# Patient Record
Sex: MALE | Race: WHITE
[De-identification: names, ages, dates, MRNs, and addresses within clinical notes are randomized per-mention and may not be internally consistent; named-entity substitution may affect disease eponyms.]

---

## 2020-10-07 ENCOUNTER — HOSPITAL ENCOUNTER (EMERGENCY)
Dept: HOSPITAL 62 - ER | Age: 50
Discharge: ANOTHER HOSPITAL | End: 2020-10-07
Payer: SELF-PAY

## 2020-10-07 VITALS — SYSTOLIC BLOOD PRESSURE: 119 MMHG | DIASTOLIC BLOOD PRESSURE: 77 MMHG

## 2020-10-07 DIAGNOSIS — K81.9: ICD-10-CM

## 2020-10-07 DIAGNOSIS — R10.10: ICD-10-CM

## 2020-10-07 DIAGNOSIS — M54.5: ICD-10-CM

## 2020-10-07 DIAGNOSIS — K86.9: Primary | ICD-10-CM

## 2020-10-07 DIAGNOSIS — R17: ICD-10-CM

## 2020-10-07 LAB
ABSOLUTE LYMPHOCYTES# (MANUAL): 2.7 10^3/UL (ref 0.5–4.7)
ABSOLUTE MONOCYTES # (MANUAL): 1.4 10^3/UL (ref 0.1–1.4)
ADD MANUAL DIFF: YES
ALBUMIN SERPL-MCNC: 3.7 G/DL (ref 3.5–5)
ALP SERPL-CCNC: 875 U/L (ref 38–126)
ANION GAP SERPL CALC-SCNC: 13 MMOL/L (ref 5–19)
ANISOCYTOSIS BLD QL SMEAR: SLIGHT
APPEARANCE UR: CLEAR
APTT PPP: (no result) S
AST SERPL-CCNC: 90 U/L (ref 17–59)
BASOPHILS NFR BLD MANUAL: 1 % (ref 0–2)
BILIRUB DIRECT SERPL-MCNC: 10.4 MG/DL (ref 0–0.4)
BILIRUB SERPL-MCNC: 11.7 MG/DL (ref 0.2–1.3)
BILIRUB UR QL STRIP: NEGATIVE
BUN SERPL-MCNC: 7 MG/DL (ref 7–20)
CALCIUM: 8.9 MG/DL (ref 8.4–10.2)
CHLORIDE SERPL-SCNC: 101 MMOL/L (ref 98–107)
CO2 SERPL-SCNC: 27 MMOL/L (ref 22–30)
EOSINOPHIL NFR BLD MANUAL: 2 % (ref 0–6)
ERYTHROCYTE [DISTWIDTH] IN BLOOD BY AUTOMATED COUNT: 15.9 % (ref 11.5–14)
GLUCOSE SERPL-MCNC: 110 MG/DL (ref 75–110)
GLUCOSE UR STRIP-MCNC: NEGATIVE MG/DL
HCT VFR BLD CALC: 35.5 % (ref 37.9–51)
HGB BLD-MCNC: 12.1 G/DL (ref 13.5–17)
KETONES UR STRIP-MCNC: NEGATIVE MG/DL
MCH RBC QN AUTO: 28.2 PG (ref 27–33.4)
MCHC RBC AUTO-ENTMCNC: 34.1 G/DL (ref 32–36)
MCV RBC AUTO: 83 FL (ref 80–97)
METAMYELOCYTES % (MANUAL): 1 % (ref 0–1)
MONOCYTES % (MANUAL): 9 % (ref 3–13)
NITRITE UR QL STRIP: NEGATIVE
OVALOCYTES BLD QL SMEAR: (no result)
PH UR STRIP: 7 [PH] (ref 5–9)
PLATELET # BLD: 373 10^3/UL (ref 150–450)
PLATELET CLUMP BLD QL SMEAR: PRESENT
PLATELET COMMENT: ADEQUATE
POTASSIUM SERPL-SCNC: 3.1 MMOL/L (ref 3.6–5)
PROT SERPL-MCNC: 7.2 G/DL (ref 6.3–8.2)
PROT UR STRIP-MCNC: NEGATIVE MG/DL
RBC # BLD AUTO: 4.29 10^6/UL (ref 4.35–5.55)
SEGMENTED NEUTROPHILS % (MAN): 70 % (ref 42–78)
SP GR UR STRIP: 1
STOMATOCYTES BLD QL SMEAR: (no result)
TOTAL CELLS COUNTED BLD: 100
UROBILINOGEN UR-MCNC: NEGATIVE MG/DL (ref ?–2)
VARIANT LYMPHS NFR BLD MANUAL: 11 % (ref 13–45)
WBC # BLD AUTO: 16 10^3/UL (ref 4–10.5)

## 2020-10-07 PROCEDURE — 87077 CULTURE AEROBIC IDENTIFY: CPT

## 2020-10-07 PROCEDURE — 85025 COMPLETE CBC W/AUTO DIFF WBC: CPT

## 2020-10-07 PROCEDURE — 87150 DNA/RNA AMPLIFIED PROBE: CPT

## 2020-10-07 PROCEDURE — 96375 TX/PRO/DX INJ NEW DRUG ADDON: CPT

## 2020-10-07 PROCEDURE — 81001 URINALYSIS AUTO W/SCOPE: CPT

## 2020-10-07 PROCEDURE — 83690 ASSAY OF LIPASE: CPT

## 2020-10-07 PROCEDURE — 76705 ECHO EXAM OF ABDOMEN: CPT

## 2020-10-07 PROCEDURE — 96366 THER/PROPH/DIAG IV INF ADDON: CPT

## 2020-10-07 PROCEDURE — 87186 SC STD MICRODIL/AGAR DIL: CPT

## 2020-10-07 PROCEDURE — 96361 HYDRATE IV INFUSION ADD-ON: CPT

## 2020-10-07 PROCEDURE — 87040 BLOOD CULTURE FOR BACTERIA: CPT

## 2020-10-07 PROCEDURE — 96365 THER/PROPH/DIAG IV INF INIT: CPT

## 2020-10-07 PROCEDURE — 36415 COLL VENOUS BLD VENIPUNCTURE: CPT

## 2020-10-07 PROCEDURE — 80053 COMPREHEN METABOLIC PANEL: CPT

## 2020-10-07 PROCEDURE — 74177 CT ABD & PELVIS W/CONTRAST: CPT

## 2020-10-07 PROCEDURE — 99285 EMERGENCY DEPT VISIT HI MDM: CPT

## 2020-10-07 NOTE — RADIOLOGY REPORT (SQ)
EXAM DESCRIPTION:  CT ABD/PELVIS WITH IV   ORAL



IMAGES COMPLETED DATE/TIME:  10/7/2020 7:47 pm



REASON FOR STUDY:  right upper quadrant pain



COMPARISON:  None.



TECHNIQUE:  CT scan of the abdomen and pelvis performed using helical scanning technique with dynamic
 intravenous contrast injection.  Oral contrast. Images reviewed with lung, soft tissue, and bone win
dows. Reconstructed coronal and sagittal MPR images reviewed. Delayed images for evaluation of the ur
inary system also acquired. All images stored on PACS.

All CT scanners at this facility use dose modulation, iterative reconstruction, and/or weight based d
osing when appropriate to reduce radiation dose to as low as reasonably achievable (ALARA).

[facility]: Dose Right  [facility]: CareDose    [facility]: Dose Right    [facility]: Teradose 4D    [facility]: Smart Technologies



CONTRAST TYPE AND DOSE:  contrast/concentration: Isovue 350.00 mmol/ml; Total Contrast Delivered: 67.
9 ml; Total Saline Delivered: 59.0 ml



RENAL FUNCTION:  BUN 7 creatinine 0.64



RADIATION DOSE:  CT Rad equipment meets quality standard of care and radiation dose reduction techniq
ues were employed. CTDIvol: 5.4 - 6.6 mGy. DLP: 661 mGy-cm..



LIMITATIONS:  None.



FINDINGS:  LOWER CHEST: No significant findings. No nodules or infiltrates.

LIVER: Several low-density masses are seen.  The largest is in the right lobe and measures 5 cm.  Int
rahepatic and extrahepatic ductal dilatation is present.

SPLEEN: Normal size. No focal lesions.

PANCREAS: There is a 52 x 64 mm mass in the head of the pancreas.

GALLBLADDER: No identified stones by CT criteria. No inflammatory changes to suggest cholecystitis.

ADRENAL GLANDS: No significant masses or asymmetry.

RIGHT KIDNEY AND URETER: No solid masses.   No significant calcifications.   No hydronephrosis or hyd
roureter.

LEFT KIDNEY AND URETER: No solid masses.   No significant calcifications.   No hydronephrosis or hydr
oureter.

AORTA AND VESSELS: No aneurysm. No dissection. Renal arteries, SMA, celiac without stenosis.

RETROPERITONEUM: No retroperitoneal adenopathy, hemorrhage or masses.

BOWEL AND PERITONEAL CAVITY: No masses or inflammatory changes. No free fluid or peritoneal masses.

APPENDIX: Not identified.

PELVIS: No mass.  No free fluid. Normal bladder.

ABDOMINAL WALL: No masses. No hernias.

BONES: No significant or acute findings.

OTHER: No other significant finding.



IMPRESSION:  Large mass in the head of the pancreas with hepatic metastases and biliary obstruction.



TECHNICAL DOCUMENTATION:  JOB ID:  [id number]

Quality ID # 436: Final reports with documentation of one or more dose reduction techniques (e.g., Au
tomated exposure control, adjustment of the mA and/or kV according to patient size, use of iterative 
reconstruction technique)

 2011 [employer]- All Rights Reserved



Reading location - IP/workstation name: [email or web address]

## 2020-10-07 NOTE — ER DOCUMENT REPORT
ED General





- General


Chief Complaint: Abdominal Pain


Stated Complaint: LOW BACK PAIN,DARK URINE


Time Seen by Provider: 10/07/20 11:46


Mode of Arrival: Ambulatory


Information source: Patient


TRAVEL OUTSIDE OF THE [place] IN LAST 30 DAYS: No





- HPI


Notes: 





Patient arrives complaining of upper abdominal pain as well as back pain.  

Patient states that this pain seemed to start yesterday.  It is been relatively 

constant.  Nothing makes better or worse.  No significant radiation of pain.  It

is been a dull sensation.  He has had no diarrhea.  No vomiting but some nausea 

and decreased appetite.  He denies any chronic medical conditions.  He denies 

any previous surgeries.  He denies any chronic daily medications.  He denies any

alcohol use.  The pain is been moderate in intensity.





- Related Data


Allergies/Adverse Reactions: 


                                        





No Known Allergies Allergy (Unverified 07/05/16 02:31)


   








Home Medications: prolosec, aspirin, IBU





Past Medical History





- General


Information source: Patient





- Social History


Smoking Status: Never Smoker


Frequency of alcohol use: None


Drug Abuse: None


Family History: Reviewed & Not Pertinent.  denies: CAD





Review of Systems





- Review of Systems


Constitutional: denies: Chills, Fever


Cardiovascular: denies: Chest pain, Palpitations


Respiratory: denies: Cough, Short of breath


-: Yes All other systems reviewed and negative





Physical Exam





- Vital signs


Vitals: 


                                        











Temp Pulse Resp BP Pulse Ox


 


 97.5 F   82   18   132/76 H  99 


 


 10/07/20 08:20  10/07/20 08:20  10/07/20 08:20  10/07/20 08:20  10/07/20 08:20











Interpretation: Normal





- General


General appearance: Appears well, Alert





- HEENT


Head: Normocephalic, Atraumatic


Eyes: Normal


Pupils: PERRL





- Respiratory


Respiratory status: No respiratory distress


Chest status: Nontender


Breath sounds: Normal


Chest palpation: Normal





- Cardiovascular


Rhythm: Regular


Heart sounds: Normal auscultation


Murmur: No





- Abdominal


Inspection: Normal


Distension: No distension


Bowel sounds: Normal


Tenderness: Tender - Mild right upper quadrant tenderness to palpation


Organomegaly: No organomegaly





- Back


Back: Normal, Nontender





- Extremities


General upper extremity: Normal inspection, Nontender, Normal color, Normal ROM,

Normal temperature


General lower extremity: Normal inspection, Nontender, Normal color, Normal ROM,

Normal temperature, Normal weight bearing.  No: Janette's sign





- Neurological


Neuro grossly intact: Yes


Cognition: Normal


Orientation: AAOx4


Rachael Coma Scale Eye Opening: Spontaneous


Paxinos Coma Scale Verbal: Oriented


Rachael Coma Scale Motor: Obeys Commands


Paxinos Coma Scale Total: 15


Speech: Normal


Motor strength normal: LUE, RUE, LLE, RLE


Sensory: Normal





- Psychological


Associated symptoms: Normal affect, Normal mood





- Skin


Skin Temperature: Warm


Skin Moisture: Dry


Skin Color: Jaundiced





Course





- Re-evaluation


Re-evalutation: 





10/07/20 18:16


I did reexamine patient at 615.  He is resting comfortably in the bed and 

drinking his oral contrast.  Care of this patient will be turned over to Dr. Bell will follow-up on CT scan and discussed results with surgeon determine 

final disposition.





- Vital Signs


Vital signs: 


                                        











Temp Pulse Resp BP Pulse Ox


 


 97.4 F   89   14   129/77 H  100 


 


 10/07/20 14:47  10/07/20 14:47  10/07/20 14:47  10/07/20 14:47  10/07/20 14:47














- Laboratory


Result Diagrams: 


                                 10/07/20 10:01





                                 10/07/20 10:01


Laboratory results interpreted by me: 


                                        











  10/07/20 10/07/20





  10:01 10:01


 


WBC  16.0 H 


 


RBC  4.29 L 


 


Hgb  12.1 L 


 


Hct  35.5 L 


 


RDW  15.9 H 


 


Lymphocytes % (Manual)  11 L 


 


Abs Neuts (Manual)  11.4 H 


 


Potassium   3.1 L


 


Total Bilirubin   11.7 H


 


Direct Bilirubin   10.4 H


 


AST   90 H


 


ALT   147 H


 


Alkaline Phosphatase   875 H














- Diagnostic Test


Radiology reviewed: Image reviewed, Reports reviewed





Discharge





- Discharge


Clinical Impression: 


 Cholecystitis





Condition: Stable


Disposition: OTHER

## 2020-10-07 NOTE — RADIOLOGY REPORT (SQ)
EXAM DESCRIPTION:  U/S ABDOMEN LIMITED W/O DOP



IMAGES COMPLETED DATE/TIME:  10/7/2020 2:47 pm



REASON FOR STUDY:  ruq pain/jaundiced



COMPARISON:  None.



TECHNIQUE:  Dynamic and static grayscale images acquired of the abdomen and recorded on PACS. Blueo
roseann selected color Doppler and spectral images recorded.



LIMITATIONS:  None.



FINDINGS:  PANCREAS: No masses.  Pancreatic duct is dilated.  6 mm.

LIVER: Heterogeneous echotexture.  There are a couple of somewhat ill-defined hypoechoic heterogeneou
s masses in the right lobe.  The smaller measures 4.6 x 3.4 x 4 cm.  The larger measures 5.3 x 6.1 x 
4.5 cm.  There is increased blood flow with the smaller lesion.  There is a 13 mm echogenic area with
in the right lobe of the liver suggestive of a hemangioma.

LIVER VASCULATURE: Normal directional flow of the main portal vein and hepatic veins.

GALLBLADDER: Gallbladder is distended.  No stones are seen.  There is sludge.  The gallbladder wall i
s thickened.

ULTRASOUND-DETECTED HARRIS'S SIGN: Positive.

INTRAHEPATIC DUCTS AND COMMON DUCT: Common bile duct and intrahepatic ducts are dilated.  Common bile
 duct measures 18 mm.

AORTA: No aneurysm.

RIGHT KIDNEY:  Normal size, 12.2 cm. Normal echogenicity. No solid or suspicious masses. No hydroneph
rosis. No calcifications.

PERITONEAL AND RIGHT PLEURAL SPACE: No ascites or effusions.

OTHER: No other significant findings.



IMPRESSION:  1.  Gallbladder sludge.  Thickening of the gallbladder wall.  Positive sonographic Nohemi
y sign.  Correlate for cholecystitis.

2.  Dilated intrahepatic ducts, common bile duct, and pancreatic duct.  Correlate for biliary obstruc
tion.

3.  There are 2 hypoechoic heterogeneous masses in the right lobe of the liver.  Cannot exclude neopl
asm.  Recommend CT with contrast.  Recommend MRI with hepatic protocol.

4.  There is what appears to be a small hemangioma in the right lobe of the liver.



TECHNICAL DOCUMENTATION:  JOB ID:  [id number]

 2011 [employer]- All Rights Reserved



Reading location - IP/workstation name: [email or web address]

## 2020-11-05 ENCOUNTER — HOSPITAL ENCOUNTER (OUTPATIENT)
Dept: HOSPITAL 62 - ER | Age: 50
Setting detail: OBSERVATION
LOS: 2 days | Discharge: HOME | End: 2020-11-07
Attending: NURSE PRACTITIONER | Admitting: HOSPITALIST
Payer: COMMERCIAL

## 2020-11-05 DIAGNOSIS — R11.2: ICD-10-CM

## 2020-11-05 DIAGNOSIS — R00.0: ICD-10-CM

## 2020-11-05 DIAGNOSIS — I95.9: ICD-10-CM

## 2020-11-05 DIAGNOSIS — R53.1: ICD-10-CM

## 2020-11-05 DIAGNOSIS — R53.81: ICD-10-CM

## 2020-11-05 DIAGNOSIS — Z60.2: ICD-10-CM

## 2020-11-05 DIAGNOSIS — C78.7: ICD-10-CM

## 2020-11-05 DIAGNOSIS — F41.9: ICD-10-CM

## 2020-11-05 DIAGNOSIS — Z79.899: ICD-10-CM

## 2020-11-05 DIAGNOSIS — D63.0: ICD-10-CM

## 2020-11-05 DIAGNOSIS — Z03.818: ICD-10-CM

## 2020-11-05 DIAGNOSIS — R53.83: ICD-10-CM

## 2020-11-05 DIAGNOSIS — E86.0: ICD-10-CM

## 2020-11-05 DIAGNOSIS — C25.9: Primary | ICD-10-CM

## 2020-11-05 DIAGNOSIS — K59.00: ICD-10-CM

## 2020-11-05 DIAGNOSIS — R06.82: ICD-10-CM

## 2020-11-05 DIAGNOSIS — Z96.89: ICD-10-CM

## 2020-11-05 DIAGNOSIS — D72.829: ICD-10-CM

## 2020-11-05 DIAGNOSIS — E46: ICD-10-CM

## 2020-11-05 DIAGNOSIS — Z80.0: ICD-10-CM

## 2020-11-05 DIAGNOSIS — D47.3: ICD-10-CM

## 2020-11-05 LAB
ABSOLUTE LYMPHOCYTES# (MANUAL): 1.1 10^3/UL (ref 0.5–4.7)
ABSOLUTE LYMPHOCYTES# (MANUAL): 1.6 10^3/UL (ref 0.5–4.7)
ABSOLUTE MONOCYTES # (MANUAL): 1.4 10^3/UL (ref 0.1–1.4)
ABSOLUTE MONOCYTES # (MANUAL): 2 10^3/UL (ref 0.1–1.4)
ADD MANUAL DIFF: YES
ADD MANUAL DIFF: YES
ALBUMIN SERPL-MCNC: 2.6 G/DL (ref 3.5–5)
ALP SERPL-CCNC: 508 U/L (ref 38–126)
ANION GAP SERPL CALC-SCNC: 9 MMOL/L (ref 5–19)
ANISOCYTOSIS BLD QL SMEAR: SLIGHT
ANISOCYTOSIS BLD QL SMEAR: SLIGHT
APPEARANCE UR: CLEAR
APTT PPP: YELLOW S
AST SERPL-CCNC: 21 U/L (ref 17–59)
BASE EXCESS BLDV CALC-SCNC: 1.1 MMOL/L
BASOPHILS NFR BLD MANUAL: 0 % (ref 0–2)
BASOPHILS NFR BLD MANUAL: 1 % (ref 0–2)
BILIRUB DIRECT SERPL-MCNC: 0.8 MG/DL (ref 0–0.4)
BILIRUB SERPL-MCNC: 1.2 MG/DL (ref 0.2–1.3)
BILIRUB UR QL STRIP: NEGATIVE
BUN SERPL-MCNC: 12 MG/DL (ref 7–20)
CALCIUM: 8.6 MG/DL (ref 8.4–10.2)
CHLORIDE SERPL-SCNC: 102 MMOL/L (ref 98–107)
CO2 SERPL-SCNC: 26 MMOL/L (ref 22–30)
DACRYOCYTES BLD QL SMEAR: SLIGHT
EOSINOPHIL NFR BLD MANUAL: 3 % (ref 0–6)
EOSINOPHIL NFR BLD MANUAL: 4 % (ref 0–6)
ERYTHROCYTE [DISTWIDTH] IN BLOOD BY AUTOMATED COUNT: 15.2 % (ref 11.5–14)
ERYTHROCYTE [DISTWIDTH] IN BLOOD BY AUTOMATED COUNT: 15.4 % (ref 11.5–14)
GLUCOSE SERPL-MCNC: 127 MG/DL (ref 75–110)
GLUCOSE UR STRIP-MCNC: NEGATIVE MG/DL
HCO3 BLDV-SCNC: 25.3 MMOL/L (ref 20–32)
HCT VFR BLD CALC: 25.1 % (ref 37.9–51)
HCT VFR BLD CALC: 26 % (ref 37.9–51)
HGB BLD-MCNC: 8.3 G/DL (ref 13.5–17)
HGB BLD-MCNC: 9 G/DL (ref 13.5–17)
INR PPP: 1.34
KETONES UR STRIP-MCNC: NEGATIVE MG/DL
MCH RBC QN AUTO: 28.1 PG (ref 27–33.4)
MCH RBC QN AUTO: 29 PG (ref 27–33.4)
MCHC RBC AUTO-ENTMCNC: 33 G/DL (ref 32–36)
MCHC RBC AUTO-ENTMCNC: 34.7 G/DL (ref 32–36)
MCV RBC AUTO: 84 FL (ref 80–97)
MCV RBC AUTO: 85 FL (ref 80–97)
MONOCYTES % (MANUAL): 7 % (ref 3–13)
MONOCYTES % (MANUAL): 9 % (ref 3–13)
NEUTS BAND NFR BLD MANUAL: 1 % (ref 3–5)
NITRITE UR QL STRIP: NEGATIVE
OVALOCYTES BLD QL SMEAR: (no result)
PCO2 BLDV: 37.9 MMHG (ref 35–63)
PH BLDV: 7.44 [PH] (ref 7.3–7.42)
PH UR STRIP: 7 [PH] (ref 5–9)
PLATELET # BLD: 445 10^3/UL (ref 150–450)
PLATELET # BLD: 473 10^3/UL (ref 150–450)
PLATELET COMMENT: (no result)
PLATELET COMMENT: ADEQUATE
POIKILOCYTOSIS BLD QL SMEAR: (no result)
POIKILOCYTOSIS BLD QL SMEAR: SLIGHT
POLYCHROMASIA BLD QL SMEAR: SLIGHT
POLYCHROMASIA BLD QL SMEAR: SLIGHT
POTASSIUM SERPL-SCNC: 4.4 MMOL/L (ref 3.6–5)
PROT SERPL-MCNC: 6.2 G/DL (ref 6.3–8.2)
PROT UR STRIP-MCNC: NEGATIVE MG/DL
PROTHROMBIN TIME: 16.8 SEC (ref 11.4–15.4)
RBC # BLD AUTO: 2.96 10^6/UL (ref 4.35–5.55)
RBC # BLD AUTO: 3.1 10^6/UL (ref 4.35–5.55)
SEGMENTED NEUTROPHILS % (MAN): 81 % (ref 42–78)
SEGMENTED NEUTROPHILS % (MAN): 81 % (ref 42–78)
SP GR UR STRIP: 1.04
TARGETS BLD QL SMEAR: SLIGHT
TOTAL CELLS COUNTED BLD: 100
TOTAL CELLS COUNTED BLD: 100
UROBILINOGEN UR-MCNC: NEGATIVE MG/DL (ref ?–2)
VARIANT LYMPHS NFR BLD MANUAL: 4 % (ref 13–45)
VARIANT LYMPHS NFR BLD MANUAL: 8 % (ref 13–45)
WBC # BLD AUTO: 20.5 10^3/UL (ref 4–10.5)
WBC # BLD AUTO: 22.3 10^3/UL (ref 4–10.5)
WBC TOXIC VACUOLES BLD QL SMEAR: PRESENT

## 2020-11-05 PROCEDURE — 96360 HYDRATION IV INFUSION INIT: CPT

## 2020-11-05 PROCEDURE — 80048 BASIC METABOLIC PNL TOTAL CA: CPT

## 2020-11-05 PROCEDURE — C1788 PORT, INDWELLING, IMP: HCPCS

## 2020-11-05 PROCEDURE — 74177 CT ABD & PELVIS W/CONTRAST: CPT

## 2020-11-05 PROCEDURE — 86850 RBC ANTIBODY SCREEN: CPT

## 2020-11-05 PROCEDURE — G0378 HOSPITAL OBSERVATION PER HR: HCPCS

## 2020-11-05 PROCEDURE — 36430 TRANSFUSION BLD/BLD COMPNT: CPT

## 2020-11-05 PROCEDURE — 83605 ASSAY OF LACTIC ACID: CPT

## 2020-11-05 PROCEDURE — C9113 INJ PANTOPRAZOLE SODIUM, VIA: HCPCS

## 2020-11-05 PROCEDURE — 81001 URINALYSIS AUTO W/SCOPE: CPT

## 2020-11-05 PROCEDURE — 87635 SARS-COV-2 COVID-19 AMP PRB: CPT

## 2020-11-05 PROCEDURE — C9803 HOPD COVID-19 SPEC COLLECT: HCPCS

## 2020-11-05 PROCEDURE — 87040 BLOOD CULTURE FOR BACTERIA: CPT

## 2020-11-05 PROCEDURE — P9016 RBC LEUKOCYTES REDUCED: HCPCS

## 2020-11-05 PROCEDURE — 36415 COLL VENOUS BLD VENIPUNCTURE: CPT

## 2020-11-05 PROCEDURE — 85610 PROTHROMBIN TIME: CPT

## 2020-11-05 PROCEDURE — 82533 TOTAL CORTISOL: CPT

## 2020-11-05 PROCEDURE — 77001 FLUOROGUIDE FOR VEIN DEVICE: CPT

## 2020-11-05 PROCEDURE — 36561 INSERT TUNNELED CV CATH: CPT

## 2020-11-05 PROCEDURE — 86900 BLOOD TYPING SEROLOGIC ABO: CPT

## 2020-11-05 PROCEDURE — 71045 X-RAY EXAM CHEST 1 VIEW: CPT

## 2020-11-05 PROCEDURE — 82803 BLOOD GASES ANY COMBINATION: CPT

## 2020-11-05 PROCEDURE — 85027 COMPLETE CBC AUTOMATED: CPT

## 2020-11-05 PROCEDURE — 80053 COMPREHEN METABOLIC PANEL: CPT

## 2020-11-05 PROCEDURE — 00532 ANES ACCESS CTR VENOUS CRCJ: CPT

## 2020-11-05 PROCEDURE — 99285 EMERGENCY DEPT VISIT HI MDM: CPT

## 2020-11-05 PROCEDURE — 93005 ELECTROCARDIOGRAM TRACING: CPT

## 2020-11-05 PROCEDURE — 93010 ELECTROCARDIOGRAM REPORT: CPT

## 2020-11-05 PROCEDURE — 86920 COMPATIBILITY TEST SPIN: CPT

## 2020-11-05 PROCEDURE — 85025 COMPLETE CBC W/AUTO DIFF WBC: CPT

## 2020-11-05 PROCEDURE — 86901 BLOOD TYPING SEROLOGIC RH(D): CPT

## 2020-11-05 RX ADMIN — SODIUM CHLORIDE PRN MLS/HR: 9 INJECTION, SOLUTION INTRAVENOUS at 21:10

## 2020-11-05 RX ADMIN — PANTOPRAZOLE SODIUM SCH MG: 40 INJECTION, POWDER, FOR SOLUTION INTRAVENOUS at 21:10

## 2020-11-05 RX ADMIN — DEXAMETHASONE SODIUM PHOSPHATE PRN MG: 10 INJECTION INTRAMUSCULAR; INTRAVENOUS at 16:37

## 2020-11-05 RX ADMIN — PROMETHAZINE HYDROCHLORIDE PRN MG: 25 INJECTION INTRAMUSCULAR; INTRAVENOUS at 22:29

## 2020-11-05 RX ADMIN — OXYCODONE HYDROCHLORIDE PRN MG: 5 TABLET ORAL at 16:34

## 2020-11-05 SDOH — SOCIAL STABILITY - SOCIAL INSECURITY: PROBLEMS RELATED TO LIVING ALONE: Z60.2

## 2020-11-05 NOTE — RADIOLOGY REPORT (SQ)
EXAM DESCRIPTION:  CHEST SINGLE VIEW



IMAGES COMPLETED DATE/TIME:  11/5/2020 2:57 pm



REASON FOR STUDY:  tachypnea



COMPARISON:  None.



EXAM PARAMETERS:  NUMBER OF VIEWS: One view.

TECHNIQUE: Single frontal radiographic view of the chest acquired.

RADIATION DOSE: NA

LIMITATIONS: None.



FINDINGS:  LUNGS AND PLEURA:  Low lung volumes and patient rotation limits examination somewhat.  Que
stion of superimposition of structures versus vague infiltrate right infrahilar region.  The left sandy
g is clear.  No pneumothorax or pleural effusion.

MEDIASTINUM AND HILAR STRUCTURES: No masses.  Contour normal.

HEART AND VASCULAR STRUCTURES: Heart normal in size.  Normal vasculature.

BONES: No acute findings.

HARDWARE: None in the chest.

OTHER: No other significant finding.



IMPRESSION:  1.  Low lung volumes and patient rotation limiting the examination somewhat.  Question o
f superimposition of structures versus vague infiltrate right infrahilar region.



TECHNICAL DOCUMENTATION:  JOB ID:  [id number]

 2011 [employer]- All Rights Reserved



Reading location - IP/workstation name: [email or web address]

## 2020-11-05 NOTE — PDOC H&P
History of Present Illness


Admission Date/PCP: 


  11/05/20 13:44





  CHERRY JAVIER MD





Patient complains of: nausea, vomiting


History of Present Illness: 


LEON WARNER is a 50 year old male with a past medical history significant for 

recently diagnosed pancreatic cancer with liver metastasis who presented to the 

emergency department today with a complaint of 1 day of nausea and vomiting; and

able to tolerate p.o. fluids.  He denies worsened abdominal discomfort from his 

baseline, diarrhea and constipation.  He further denies fever, chills, chest 

pain, palpitations, dyspnea, orthopnea.


Evaluation in the emergency department revealed hypotension (89/63), tachypnea 

(RR 42), maintaining oxygen saturations on room air and otherwise normal vital 

signs.


CBC revealed leukocytosis (WBCs 22.3), anemia (hemoglobin 8.3; decreased from 

12.1 last month), and thrombocytosis ().  INR is slightly elevated 1.34. 

CBG is acceptable.  Chemistry is notable for mildly elevated alk phos.  Lactic 

acid normal.


CT abdomen pelvis demonstrated large mass to the head of the pancreas with 

extensive hepatic metastasis and a pigtail catheter in the right upper abdomen 

that appears to extend from the common bile duct to the stomach.  Patient 

confirms that this was placed while at [facility] last month.  He is noted to have 

constipation but no other acute findings.


She was provided IV fluids and referred to the hospital service for further 

evaluation management of the above-stated complaints and findings.





Past Medical History


Cardiac Medical History: Reports: None


Pulmonary Medical History: Reports: None


EENT Medical History: Reports: None


Neurological Medical History: Reports: None


Endocrine Medical History: Reports: None


Renal/ Medical History: Reports: None


Malignancy Medical History: Reports: Pancreatic Cancer


GI Medical History: Reports: None


Musculoskeltal Medical History: Reports: None


Skin Medical History: Reports: None


Psychiatric Medical History: Reports: None


Traumatic Medical History: Reports: None


Hematology: Reports: None


Infectious Medical History: Reports: None





Past Surgical History


Past Surgical History: Reports: Other - Pigtail catheter from common bile duct 

to stomach.





Social History


Information Source: Patient


Lives with: Alone


Smoking Status: Never Smoker


Electronic Cigarette use?: No


Frequency of Alcohol Use: None


Hx Recreational Drug Use: No


Hx Prescription Drug Abuse: No





- Advance Directive


Resuscitation Status: Full Code





Family History


Family History: Reviewed & Not Pertinent.  denies: CAD


Parental Family History Reviewed: Yes


Children Family History Reviewed: Yes


Sibling(s) Family History Reviewed.: Yes





Medication/Allergy


Home Medications: 








Ondansetron [Zofran Odt 4 mg Tablet] 4 mg PO Q8HP PRN 11/05/20 


Oxycodone HCl [Oxy-Ir 5 mg Tablet] 5 mg PO Q6HP PRN 11/05/20 








Allergies/Adverse Reactions: 


                                        





No Known Allergies Allergy (Verified 11/05/20 08:58)


   











Review of Systems


Constitutional: PRESENT: fatigue, weight loss.  ABSENT: chills, fever(s), 

headache(s), weight gain


Eyes: ABSENT: visual disturbances


Ears: ABSENT: hearing changes


Cardiovascular: ABSENT: chest pain, dyspnea on exertion, edema, orthropnea, 

palpitations


Respiratory: ABSENT: cough, hemoptysis


Gastrointestinal: PRESENT: nausea, vomiting.  ABSENT: abdominal pain, constipat

ion, diarrhea, hematemesis, hematochezia


Genitourinary: ABSENT: dysuria, hematuria


Musculoskeletal: ABSENT: joint swelling


Integumentary: ABSENT: rash, wounds


Neurological: ABSENT: abnormal gait, abnormal speech, confusion, dizziness, 

focal weakness, syncope


Psychiatric: ABSENT: anxiety, depression, homidical ideation, suicidal ideation


Endocrine: ABSENT: cold intolerance, heat intolerance, polydipsia, polyuria


Hematologic/Lymphatic: ABSENT: easy bleeding, easy bruising





Physical Exam


Vital Signs: 


                                        











Temp Pulse Resp BP Pulse Ox


 


 97.0 F   104 H  28 H  99/64 L  99 


 


 11/05/20 08:58  11/05/20 08:58  11/05/20 13:01  11/05/20 13:01  11/05/20 13:01








                                 Intake & Output











 11/04/20 11/05/20 11/06/20





 06:59 06:59 06:59


 


Intake Total   1000


 


Balance   1000


 


Weight   58.06 kg











General appearance: PRESENT: no acute distress, thin, well-developed


Head exam: PRESENT: atraumatic, normocephalic


Eye exam: PRESENT: conjunctiva pink, EOMI, PERRLA.  ABSENT: scleral icterus


Mouth exam: PRESENT: dry mucosa, tongue midline


Teeth exam: PRESENT: edentulous


Respiratory exam: PRESENT: clear to auscultation destiny, symmetrical, tachypnea, 

unlabored, other - Room air.  ABSENT: rales, rhonchi, wheezes


Cardiovascular exam: PRESENT: RRR, +S1, +S2.  ABSENT: diastolic murmur, rubs, 

systolic murmur


Pulses: PRESENT: normal dorsalis pedis pul


Vascular exam: PRESENT: normal capillary refill


GI/Abdominal exam: PRESENT: normal bowel sounds, soft, tenderness.  ABSENT: 

distended, guarding, mass, organolmegaly, rebound


Rectal exam: PRESENT: deferred


Extremities exam: PRESENT: full ROM.  ABSENT: calf tenderness, clubbing, pedal 

edema


Musculoskeletal exam: PRESENT: ambulatory


Neurological exam: PRESENT: alert, awake, oriented to person, oriented to place,

oriented to time, oriented to situation, CN II-XII grossly intact.  ABSENT: 

motor sensory deficit


Psychiatric exam: PRESENT: appropriate affect, normal mood.  ABSENT: homicidal 

ideation, suicidal ideation


Skin exam: PRESENT: dry, intact, warm.  ABSENT: cyanosis, rash





Results


Laboratory Results: 


                                        





                                 11/05/20 08:45 





                                 11/05/20 08:45 





                                        











  11/05/20 11/05/20 11/05/20





  08:45 08:45 08:45


 


WBC  22.3 H  


 


RBC  2.96 L  


 


Hgb  8.3 L  


 


Hct  25.1 L  


 


MCV  85  


 


MCH  28.1  


 


MCHC  33.0  


 


RDW  15.4 H  


 


Plt Count  473 H  


 


Seg Neutrophils %  Not Reportable  


 


VBG pH    7.44 H


 


VBG pCO2    37.9


 


VBG HCO3    25.3


 


VBG Base Excess    1.1


 


Sodium   137.3 


 


Potassium   4.4 


 


Chloride   102 


 


Carbon Dioxide   26 


 


Anion Gap   9 


 


BUN   12 


 


Creatinine   0.56 


 


Est GFR ( Amer)   > 60 


 


Glucose   127 H 


 


Lactic Acid   


 


Calcium   8.6 


 


Total Bilirubin   1.2 


 


AST   21 


 


Alkaline Phosphatase   508 H 


 


Total Protein   6.2 L 


 


Albumin   2.6 L 














  11/05/20 11/05/20





  08:45 11:15


 


WBC  


 


RBC  


 


Hgb  


 


Hct  


 


MCV  


 


MCH  


 


MCHC  


 


RDW  


 


Plt Count  


 


Seg Neutrophils %  


 


VBG pH  


 


VBG pCO2  


 


VBG HCO3  


 


VBG Base Excess  


 


Sodium  


 


Potassium  


 


Chloride  


 


Carbon Dioxide  


 


Anion Gap  


 


BUN  


 


Creatinine  


 


Est GFR (African Amer)  


 


Glucose  


 


Lactic Acid  1.7  1.1


 


Calcium  


 


Total Bilirubin  


 


AST  


 


Alkaline Phosphatase  


 


Total Protein  


 


Albumin  











Impressions: 


                                        





Abdomen/Pelvis CT  11/05/20 10:14


IMPRESSION:  1.  Once again there is a large mass in the head of the pancreas 

with extensive hepatic metastases.  There is a pigtail catheter in the upper abd

omen that appears to extend from the common bile duct to the stomach.  There is 

no information regarding this.


2.  Constipation.


3.  No other significant finding in the abdomen or pelvis.


 














Assessment and Plan





- Diagnosis


(1) Hypotension


Is this a current diagnosis for this admission?: Yes   


Plan: 


Likely secondary to dehydration.


Patient presented with nausea and vomiting and inability to tolerate p.o. 

fluids.





He is provided IV fluids followed by maintenance fluids.


We will check orthostatic blood pressures every shift.


Check random and a.m. cortisol level.








(2) Nausea & vomiting


Is this a current diagnosis for this admission?: Yes   


Plan: 


Clear liquid diet.


Antiemetics as needed.








(3) Anemia


Qualifiers: 


   Anemia type: other cause   Other causes of anemia: chronic disease, 

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   


Is this a current diagnosis for this admission?: Yes   


Plan: 


Discussed with Dr. Javier; recommends 1 unit PRBC.


Follow-up CBC.








(4) Pancreatic cancer metastasized to liver


Is this a current diagnosis for this admission?: Yes   


Plan: 


Follows with Dr. Javier; consulted.


Patient was to have initial recall appointment for Port-A-Cath placement.


Discussed with Dr. Javier; he is requested reports consult to general surgery.


Primary management per oncology.








- Time


Time Spent with patient: 35 or more minutes


Medications reviewed and adjusted accordingly: Yes


Anticipated Discharge Disposition: Home, Self Care


Anticipated Discharge Timeframe: >72 hrs





- Inpatient Certification


Based on my medical assessment, after consideration of the patient's 

comorbidities, presenting symptoms, or acuity I expect that the services needed 

warrant INPATIENT care.: Yes


I certify that my determination is in accordance with my understanding of 

Medicare's requirements for reasonable and necessary INPATIENT services [42 CFR 

412.3e].: Yes


Medical Necessity: Need For IV Fluids, Need for Surgery, Risk of Diagnosis Which

Will Require Inpatient Eval/Care/Monitoring

## 2020-11-05 NOTE — RADIOLOGY REPORT (SQ)
EXAM DESCRIPTION:  CT ABD/PELVIS WITH IV ONLY



IMAGES COMPLETED DATE/TIME:  11/5/2020 11:05 am



REASON FOR STUDY:  pain/vomiting



COMPARISON:  10/7/2020



TECHNIQUE:  CT scan of the abdomen and pelvis performed using helical scanning technique with dynamic
 intravenous contrast injection.  No oral contrast. Images reviewed with lung, soft tissue, and bone 
windows. Reconstructed coronal and sagittal MPR images reviewed. Delayed images for evaluation of the
 urinary system also acquired. All images stored on PACS.

All CT scanners at this facility use dose modulation, iterative reconstruction, and/or weight based d
osing when appropriate to reduce radiation dose to as low as reasonably achievable (ALARA).

[facility]: Dose Right  [facility]: CareDose    [facility]: Dose Right    [facility]: Teradose 4D    [facility]: [employer] Technologies



CONTRAST TYPE AND DOSE:  contrast/concentration: Isovue 350.00 mmol/ml; Total Contrast Delivered: 66.
0 ml; Total Saline Delivered: 64.9 ml



RENAL FUNCTION:  BUN 12 creatinine 0.56



RADIATION DOSE:  CT Rad equipment meets quality standard of care and radiation dose reduction techniq
ues were employed. CTDIvol: 5.6 - 6.8 mGy. DLP: 1132 mGy-cm..



LIMITATIONS:  None.



FINDINGS:  LOWER CHEST: No significant findings. No nodules or infiltrates.

LIVER: Once again there is extensive metastatic disease in the liver.  There appears to be a biliary 
drainage catheter from the common bile duct apparently to the stomach.  There is air in many of the h
epatic ducts.

SPLEEN: Normal size. No focal lesions.

PANCREAS: 7.5 cm heterogeneous mass in the head of the pancreas.

GALLBLADDER: Normal.

ADRENAL GLANDS: No significant masses or asymmetry.

RIGHT KIDNEY AND URETER: No solid masses.   No significant calcifications.   No hydronephrosis or hyd
roureter.

LEFT KIDNEY AND URETER: No solid masses.   No significant calcifications.   No hydronephrosis or hydr
oureter.

AORTA AND VESSELS: No aneurysm. No dissection. Renal arteries, SMA, celiac without stenosis.

RETROPERITONEUM: No retroperitoneal adenopathy, hemorrhage or masses.

BOWEL AND PERITONEAL CAVITY: There is some residual oral contrast in the colon.  There is retained st
ool.  No obvious bowel mass.

APPENDIX: Not identified.

PELVIS: Urinary bladder is normal.  No abnormal pelvic mass or fluid collection.

ABDOMINAL WALL: No masses. No hernias.

BONES: No significant or acute findings.

OTHER: No other significant finding.



IMPRESSION:  1.  Once again there is a large mass in the head of the pancreas with extensive hepatic 
metastases.  There is a pigtail catheter in the upper abdomen that appears to extend from the common 
bile duct to the stomach.  There is no information regarding this.

2.  Constipation.

3.  No other significant finding in the abdomen or pelvis.



TECHNICAL DOCUMENTATION:  JOB ID:  [id number]

Quality ID # 436: Final reports with documentation of one or more dose reduction techniques (e.g., Au
tomated exposure control, adjustment of the mA and/or kV according to patient size, use of iterative 
reconstruction technique)

 2011 [employer]- All Rights Reserved



Reading location - IP/workstation name: [email or web address]

## 2020-11-05 NOTE — ER DOCUMENT REPORT
ED General





- General


Chief Complaint: Vomiting


Stated Complaint: VOMITING, CHILLS


Time Seen by Provider: 11/05/20 09:44


Primary Care Provider: 


CHERRY JAVIER MD [Primary Care Provider] - Follow up as needed


Mode of Arrival: Medic


Information source: Patient


TRAVEL OUTSIDE OF THE [place] IN LAST 30 DAYS: No





- HPI


Notes: 





Patient arrives complaining of nausea vomiting anxiety and abdominal pain.  He 

states he was recently diagnosed with cancer and has been having abdominal pain 

for several days.  He is also been having nausea but this morning he got worse 

and began to vomit and have weakness.  The nausea and vomiting he states were 

severe this morning.  Nothing made them better or worse.  They are relatively 

constant.  He states he has not yet started any treatment for the cancer.





- Related Data


Allergies/Adverse Reactions: 


                                        





No Known Allergies Allergy (Verified 11/05/20 08:58)


   








Home Medications: zofran.  pantaprazol.  carafate.  oxycodone.  alprazolam.  

prozac





Past Medical History





- General


Information source: Patient





- Social History


Smoking Status: Never Smoker


Chew tobacco use (# tins/day): No


Frequency of alcohol use: None


Drug Abuse: None


Family History: Reviewed & Not Pertinent.  denies: CAD


Patient has homicidal ideation: No





Review of Systems





- Review of Systems


Constitutional: denies: Chills, Fever


Cardiovascular: denies: Chest pain, Palpitations


Respiratory: denies: Cough, Short of breath


-: Yes All other systems reviewed and negative





Physical Exam





- Vital signs


Vitals: 





                                        











Temp Pulse Ox


 


 97 F L  98 


 


 11/05/20 08:41  11/05/20 08:41











Interpretation: Tachycardic





- General


General appearance: Alert


In distress: None





- HEENT


Head: Normocephalic, Atraumatic


Eyes: Normal


Pupils: PERRL





- Respiratory


Respiratory status: No respiratory distress


Chest status: Nontender


Breath sounds: Normal


Chest palpation: Normal





- Cardiovascular


Rhythm: Regular


Heart sounds: Normal auscultation


Murmur: No





- Abdominal


Inspection: Normal


Distension: No distension


Tenderness: Tender - Diffusely tender to palpation





- Back


Back: Normal, Nontender





- Extremities


General upper extremity: Normal inspection, Nontender, Normal color, Normal ROM,

Normal temperature


General lower extremity: Normal inspection, Nontender, Normal color, Normal ROM,

Normal temperature.  No: Janette's sign





- Neurological


Neuro grossly intact: Yes


Cognition: Normal


Orientation: AAOx4


Rachael Coma Scale Eye Opening: Spontaneous


Rachael Coma Scale Verbal: Oriented


[place] Coma Scale Motor: Obeys Commands


[place] Coma Scale Total: 15


Speech: Normal


Motor strength normal: LUE, RUE, LLE, RLE


Sensory: Normal





- Psychological


Associated symptoms: Normal affect, Normal mood





- Skin


Skin Temperature: Warm


Skin Moisture: Dry


Skin Color: Pale





Course





- Re-evaluation


Re-evalutation: 





11/05/20 12:33


Patient with a known history of recently diagnosed cancer.  He presents with 

severe nausea vomiting.  He is got some borderline hypotension.  He will be 

treated with fluids for this.  He is also noticed to have a hemoglobin of 8.3 

which is down from 9.62 days ago.  Therefore patient will also be transfused a 

unit of blood.  His white blood cell count is elevated at 22.  However it was 26

2 days ago and he has no obvious source of infection or fever at this time.  

Therefore we will wait to give antibiotics unless there is a more clear-cut 

reason.





- Vital Signs


Vital signs: 





                                        











Temp Pulse Resp BP Pulse Ox


 


 97.0 F   104 H  31 H  98/63 L  97 


 


 11/05/20 08:58  11/05/20 08:58  11/05/20 10:01  11/05/20 10:00  11/05/20 10:01














- Laboratory


Result Diagrams: 


                                 11/05/20 08:45





                                 11/05/20 08:45


Laboratory results interpreted by me: 





                                        











  11/05/20 11/05/20 11/05/20





  08:45 08:45 08:45


 


WBC  22.3 H  


 


RBC  2.96 L  


 


Hgb  8.3 L  


 


Hct  25.1 L  


 


RDW  15.4 H  


 


Plt Count  473 H  


 


Seg Neuts % (Manual)  81 H  


 


Band Neutrophils %  1 L  


 


Lymphocytes % (Manual)  4 L  


 


Abs Neuts (Manual)  18.3 H  


 


Abs Monocytes (Manual)  2.0 H  


 


Absolute Eos (Manual)  0.9 H  


 


PT   16.8 H 


 


VBG pH   


 


Glucose    127 H


 


Direct Bilirubin    0.8 H


 


Alkaline Phosphatase    508 H


 


Total Protein    6.2 L


 


Albumin    2.6 L














  11/05/20





  08:45


 


WBC 


 


RBC 


 


Hgb 


 


Hct 


 


RDW 


 


Plt Count 


 


Seg Neuts % (Manual) 


 


Band Neutrophils % 


 


Lymphocytes % (Manual) 


 


Abs Neuts (Manual) 


 


Abs Monocytes (Manual) 


 


Absolute Eos (Manual) 


 


PT 


 


VBG pH  7.44 H


 


Glucose 


 


Direct Bilirubin 


 


Alkaline Phosphatase 


 


Total Protein 


 


Albumin 














- Diagnostic Test


Radiology reviewed: Image reviewed, Reports reviewed





Discharge





- Discharge


Clinical Impression: 


 Pancreatic cancer metastasized to liver





Anemia


Qualifiers:


 Anemia type: other cause Other causes of anemia: chronic disease, neoplastic 

Qualified Code(s): D63.0 - Anemia in neoplastic disease





Vomiting


Qualifiers:


 Vomiting type: unspecified Vomiting Intractability: non-intractable Nausea 

presence: with nausea Qualified Code(s): R11.2 - Nausea with vomiting, 

unspecified





Condition: Serious


Disposition: ADMITTED AS INPATIENT


Admitting Provider: Charu (Hospitalist) - ning to admit


Unit Admitted: Medical Floor


Referrals: 


CHERRY JAVIER MD [Primary Care Provider] - Follow up as needed

## 2020-11-05 NOTE — PDOC CONSULTATION
Consultation


Consult Date: 11/05/20


Attending physician:: CHERRY JAVIER


Provider Consulted: ANKIT PEARSON


Consult reason:: Port placement





History of Present Illness


Admission Date/PCP: 


  11/05/20 13:44





  CHERRY JAVIER MD





History of Present Illness: 


LEON WARNER is a 50 year old male


Presents emergency department via ground rescue complaining of abdominal pain, 

nausea vomiting failure to thrive, and weakness.  Patient was found to be 

hypotensive and tachycardic, primarily from dehydration.  Patient is admitted to

the hospital service with medical oncology and general surgery consulting.  

Patient was recently diagnosed with advanced pancreatic cancer with multiple 

liver metastases, status post Endo biliary stent placement at [facility] last 

month.  Patient is found to be anemic, now receiving blood transfusions.  

Surgery consulted for port placement.  Patient denies history of alcohol or 

substance abuse.





Past Medical History


Past Medical History: 





Anemia, pancreatic carcinoma with metastatic disease to the liver; biliary and 

emerging enteric obstruction


Cardiac Medical History: Reports: None


   Denies: Congestive Heart Failure, Myocardial Infarction, Hypertension


Pulmonary Medical History: Reports: None


   Denies: Asthma, Bronchitis, Chronic Obstructive Pulmonary Disease (COPD), 

Pneumonia, Tuberculosis


EENT Medical History: Reports: None


Neurological Medical History: Reports: None


   Denies: Seizures


Endocrine Medical History: Reports: None


Renal/ Medical History: Reports: None


   Denies: End Stage Renal Disease


Malignancy Medical History: Reports: Pancreatic Cancer


GI Medical History: Reports: None


   Denies: Cirrhosis, Gastroesophageal Reflux Disease


Musculoskeltal Medical History: Reports: None


   Denies: Arthritis


Skin Medical History: Reports: None


Psychiatric Medical History: Reports: None


   Denies: Bipolar Disorder, Depression


Traumatic Medical History: Reports: None


Hematology: Reports: None


   Denies: Anemia, Bleeding Tendencies


Infectious Medical History: Reports: None





Past Surgical History


Past Surgical History: 





Endoscopic procedures, enterobiliary stent placement


Past Surgical History: Reports: Other - Pigtail catheter from common bile duct 

to stomach.





Social History


Information Source: Patient


Lives with: Alone


Smoking Status: Never Smoker


Electronic Cigarette use?: No


Frequency of Alcohol Use: None


Hx Recreational Drug Use: No


Drugs: None


Hx Prescription Drug Abuse: No





- Advance Directive


Resuscitation Status: Full Code





Family History


Family History: Reviewed & Not Pertinent, Other - Significant for pancreatic 

cancer in grandmother.  denies: CAD


Parental Family History Reviewed: Yes


Children Family History Reviewed: Yes


Sibling(s) Family History Reviewed.: Yes





Medication/Allergy


Home Medications: 








Alprazolam [Xanax 0.5 mg Tablet] 0.5 mg PO DAILY 11/05/20 


Fluoxetine HCl [Prozac] 40 mg PO DAILY 11/05/20 


Ondansetron [Zofran Odt 4 mg Tablet] 4 mg PO Q8HP PRN 11/05/20 


Oxycodone HCl [Oxy-Ir 5 mg Tablet] 5 mg PO Q6HP PRN 11/05/20 


Pantoprazole Sodium [Protonix 40 mg Dr Tablet] 40 mg PO DAILY 11/05/20 


Sucralfate [Carafate 1 gm Tablet] 1 gm PO ACHS 11/05/20 








Allergies/Adverse Reactions: 


                                        





No Known Allergies Allergy (Verified 11/05/20 08:58)


   











Review of Systems


Constitutional: PRESENT: as per HPI


Nose, Mouth, and Throat: PRESENT: as per HPI


Cardiovascular: PRESENT: as per HPI


Neurological: PRESENT: as per HPI


Endocrine: ABSENT: cold intolerance, heat intolerance, polydipsia, polyuria





Physical Exam


Vital Signs: 


                                        











Temp Pulse Resp BP Pulse Ox


 


 97.7 F   91   16   100/59 L  96 


 


 11/05/20 18:31  11/05/20 18:31  11/05/20 18:31  11/05/20 18:31  11/05/20 18:31








                                 Intake & Output











 11/04/20 11/05/20 11/06/20





 06:59 06:59 06:59


 


Intake Total   1000


 


Balance   1000


 


Weight   58.06 kg











General appearance: PRESENT: other - Appears weak, short of breath


Head exam: PRESENT: normocephalic


Eye exam: PRESENT: other - No evidence of icterus


Mouth exam: PRESENT: dry mucosa


Neck exam: PRESENT: full ROM


Respiratory exam: PRESENT: decreased breath sounds


Cardiovascular exam: PRESENT: RRR


Pulses: PRESENT: normal carotid pulses, normal radial pulses


GI/Abdominal exam: PRESENT: other - Moderately distended with liver palpable 

beneath the right subcostal margin


Rectal exam: PRESENT: deferred


Musculoskeletal exam: PRESENT: full ROM


Neurological exam: PRESENT: oriented to person, oriented to place, oriented to 

time, oriented to situation


Psychiatric exam: PRESENT: appropriate affect


Skin exam: PRESENT: dry





Results


Laboratory Results: 


                                        





                                 11/05/20 08:45 





                                 11/05/20 08:45 





                                        











  11/05/20 11/05/20 11/05/20





  08:45 08:45 08:45


 


WBC  22.3 H  


 


RBC  2.96 L  


 


Hgb  8.3 L  


 


Hct  25.1 L  


 


MCV  85  


 


MCH  28.1  


 


MCHC  33.0  


 


RDW  15.4 H  


 


Plt Count  473 H  


 


Seg Neutrophils %  Not Reportable  


 


VBG pH    7.44 H


 


VBG pCO2    37.9


 


VBG HCO3    25.3


 


VBG Base Excess    1.1


 


Sodium   137.3 


 


Potassium   4.4 


 


Chloride   102 


 


Carbon Dioxide   26 


 


Anion Gap   9 


 


BUN   12 


 


Creatinine   0.56 


 


Est GFR ( Amer)   > 60 


 


Glucose   127 H 


 


Lactic Acid   


 


Calcium   8.6 


 


Total Bilirubin   1.2 


 


AST   21 


 


Alkaline Phosphatase   508 H 


 


Total Protein   6.2 L 


 


Albumin   2.6 L 


 


Urine Color   


 


Urine Appearance   


 


Urine pH   


 


Ur Specific Gravity   


 


Urine Protein   


 


Urine Glucose (UA)   


 


Urine Ketones   


 


Urine Blood   


 


Urine Nitrite   


 


Ur Leukocyte Esterase   


 


Urine WBC (Auto)   


 


Urine RBC (Auto)   


 


Blood Type   


 


Antibody Screen   














  11/05/20 11/05/20 11/05/20





  08:45 11:15 14:30


 


WBC   


 


RBC   


 


Hgb   


 


Hct   


 


MCV   


 


MCH   


 


MCHC   


 


RDW   


 


Plt Count   


 


Seg Neutrophils %   


 


VBG pH   


 


VBG pCO2   


 


VBG HCO3   


 


VBG Base Excess   


 


Sodium   


 


Potassium   


 


Chloride   


 


Carbon Dioxide   


 


Anion Gap   


 


BUN   


 


Creatinine   


 


Est GFR (African Amer)   


 


Glucose   


 


Lactic Acid  1.7  1.1  0.9


 


Calcium   


 


Total Bilirubin   


 


AST   


 


Alkaline Phosphatase   


 


Total Protein   


 


Albumin   


 


Urine Color   


 


Urine Appearance   


 


Urine pH   


 


Ur Specific Gravity   


 


Urine Protein   


 


Urine Glucose (UA)   


 


Urine Ketones   


 


Urine Blood   


 


Urine Nitrite   


 


Ur Leukocyte Esterase   


 


Urine WBC (Auto)   


 


Urine RBC (Auto)   


 


Blood Type   


 


Antibody Screen   














  11/05/20 11/05/20





  14:30 14:30


 


WBC  


 


RBC  


 


Hgb  


 


Hct  


 


MCV  


 


MCH  


 


MCHC  


 


RDW  


 


Plt Count  


 


Seg Neutrophils %  


 


VBG pH  


 


VBG pCO2  


 


VBG HCO3  


 


VBG Base Excess  


 


Sodium  


 


Potassium  


 


Chloride  


 


Carbon Dioxide  


 


Anion Gap  


 


BUN  


 


Creatinine  


 


Est GFR (African Amer)  


 


Glucose  


 


Lactic Acid  


 


Calcium  


 


Total Bilirubin  


 


AST  


 


Alkaline Phosphatase  


 


Total Protein  


 


Albumin  


 


Urine Color   YELLOW


 


Urine Appearance   CLEAR


 


Urine pH   7.0


 


Ur Specific Gravity   1.041


 


Urine Protein   NEGATIVE


 


Urine Glucose (UA)   NEGATIVE


 


Urine Ketones   NEGATIVE


 


Urine Blood   NEGATIVE


 


Urine Nitrite   NEGATIVE


 


Ur Leukocyte Esterase   NEGATIVE


 


Urine WBC (Auto)   1


 


Urine RBC (Auto)   1


 


Blood Type  A POSITIVE 


 


Antibody Screen  NEGATIVE 











Impressions: 


                                        





Chest X-Ray  11/05/20 00:00


IMPRESSION:  1.  Low lung volumes and patient rotation limiting the examination 

somewhat.  Question of superimposition of structures versus vague infiltrate 

right infrahilar region.


 








Abdomen/Pelvis CT  11/05/20 10:14


IMPRESSION:  1.  Once again there is a large mass in the head of the pancreas 

with extensive hepatic metastases.  There is a pigtail catheter in the upper 

abdomen that appears to extend from the common bile duct to the stomach.  There 

is no information regarding this.


2.  Constipation.


3.  No other significant finding in the abdomen or pelvis.


 














Assessment & Plan





- Diagnosis


(1) Pancreatic cancer metastasized to liver


Is this a current diagnosis for this admission?: Yes   


Plan: 


Impression: Acute decompensation due to dehydration, exacerbated by anemia and 

malnutrition; patient being resuscitated, transfused.  Anticipate need for port 

placement





Plan:





1.  We have checked rapid Covid status and it is negative





2.  We will keep patient n.p.o. after midnight, and set him up for port 

placement, single-chamber, right subclavian position, 1 hour, LMAC anesthesia.  

The risk benefits alternatives to the planned procedure were explained to the 

patient including bleeding, infection, catheter malfunction, pneumothorax, and 

DVT.  Was explained to the patient, as well as his mother at bedside.





I also explained to them that myself or one of my colleagues depending upon 

surgeon availability will perform the procedure.








(2) Nausea & vomiting


Is this a current diagnosis for this admission?: Yes   





(3) Malnutrition


Is this a current diagnosis for this admission?: Yes   





(4) Anemia


Qualifiers: 


   Anemia type: other cause   Other causes of anemia: chronic disease, 

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   


Is this a current diagnosis for this admission?: Yes   





(5) Hypotension


Is this a current diagnosis for this admission?: Yes   





(6) Vomiting


Qualifiers: 


   Vomiting type: unspecified   Vomiting Intractability: non-intractable   Na

usea presence: with nausea   Qualified Code(s): R11.2 - Nausea with vomiting, 

unspecified

## 2020-11-06 LAB
ABSOLUTE LYMPHOCYTES# (MANUAL): 1.5 10^3/UL (ref 0.5–4.7)
ABSOLUTE MONOCYTES # (MANUAL): 0.9 10^3/UL (ref 0.1–1.4)
ADD MANUAL DIFF: YES
ALBUMIN SERPL-MCNC: 2.4 G/DL (ref 3.5–5)
ALP SERPL-CCNC: 422 U/L (ref 38–126)
ANION GAP SERPL CALC-SCNC: 9 MMOL/L (ref 5–19)
ANISOCYTOSIS BLD QL SMEAR: SLIGHT
AST SERPL-CCNC: 23 U/L (ref 17–59)
BASOPHILS NFR BLD MANUAL: 1 % (ref 0–2)
BILIRUB DIRECT SERPL-MCNC: 0.8 MG/DL (ref 0–0.4)
BILIRUB SERPL-MCNC: 1.3 MG/DL (ref 0.2–1.3)
BUN SERPL-MCNC: 8 MG/DL (ref 7–20)
CALCIUM: 8.5 MG/DL (ref 8.4–10.2)
CHLORIDE SERPL-SCNC: 104 MMOL/L (ref 98–107)
CO2 SERPL-SCNC: 22 MMOL/L (ref 22–30)
EOSINOPHIL NFR BLD MANUAL: 1 % (ref 0–6)
ERYTHROCYTE [DISTWIDTH] IN BLOOD BY AUTOMATED COUNT: 15.5 % (ref 11.5–14)
GLUCOSE SERPL-MCNC: 96 MG/DL (ref 75–110)
HCT VFR BLD CALC: 26.1 % (ref 37.9–51)
HGB BLD-MCNC: 8.9 G/DL (ref 13.5–17)
MCH RBC QN AUTO: 28.6 PG (ref 27–33.4)
MCHC RBC AUTO-ENTMCNC: 34.1 G/DL (ref 32–36)
MCV RBC AUTO: 84 FL (ref 80–97)
MONOCYTES % (MANUAL): 4 % (ref 3–13)
PLATELET # BLD: 440 10^3/UL (ref 150–450)
PLATELET COMMENT: (no result)
POTASSIUM SERPL-SCNC: 4.2 MMOL/L (ref 3.6–5)
PROT SERPL-MCNC: 5.8 G/DL (ref 6.3–8.2)
RBC # BLD AUTO: 3.11 10^6/UL (ref 4.35–5.55)
SEGMENTED NEUTROPHILS % (MAN): 87 % (ref 42–78)
TOTAL CELLS COUNTED BLD: 100
VARIANT LYMPHS NFR BLD MANUAL: 7 % (ref 13–45)
WBC # BLD AUTO: 21.6 10^3/UL (ref 4–10.5)

## 2020-11-06 RX ADMIN — FLUOXETINE SCH MG: 20 CAPSULE ORAL at 09:20

## 2020-11-06 RX ADMIN — OXYCODONE HYDROCHLORIDE PRN MG: 5 TABLET ORAL at 02:01

## 2020-11-06 RX ADMIN — SODIUM CHLORIDE PRN MLS/HR: 9 INJECTION, SOLUTION INTRAVENOUS at 04:59

## 2020-11-06 RX ADMIN — SODIUM CHLORIDE PRN MLS/HR: 9 INJECTION, SOLUTION INTRAVENOUS at 20:25

## 2020-11-06 RX ADMIN — SUCRALFATE SCH: 1 TABLET ORAL at 10:50

## 2020-11-06 RX ADMIN — PROMETHAZINE HYDROCHLORIDE PRN MG: 25 INJECTION INTRAMUSCULAR; INTRAVENOUS at 20:24

## 2020-11-06 RX ADMIN — PANTOPRAZOLE SODIUM SCH MG: 40 INJECTION, POWDER, FOR SOLUTION INTRAVENOUS at 22:35

## 2020-11-06 RX ADMIN — SUCRALFATE SCH GM: 1 TABLET ORAL at 15:09

## 2020-11-06 RX ADMIN — OXYCODONE HYDROCHLORIDE PRN MG: 5 TABLET ORAL at 09:20

## 2020-11-06 RX ADMIN — DEXAMETHASONE SODIUM PHOSPHATE PRN MG: 10 INJECTION INTRAMUSCULAR; INTRAVENOUS at 15:13

## 2020-11-06 RX ADMIN — PANTOPRAZOLE SODIUM SCH MG: 40 INJECTION, POWDER, FOR SOLUTION INTRAVENOUS at 09:21

## 2020-11-06 RX ADMIN — OXYCODONE HYDROCHLORIDE PRN MG: 5 TABLET ORAL at 17:15

## 2020-11-06 RX ADMIN — DEXAMETHASONE SODIUM PHOSPHATE PRN MG: 10 INJECTION INTRAMUSCULAR; INTRAVENOUS at 05:01

## 2020-11-06 RX ADMIN — SUCRALFATE SCH GM: 1 TABLET ORAL at 22:35

## 2020-11-06 RX ADMIN — DOCUSATE SODIUM SCH: 100 CAPSULE, LIQUID FILLED ORAL at 09:21

## 2020-11-06 RX ADMIN — OXYCODONE HYDROCHLORIDE PRN MG: 5 TABLET ORAL at 23:34

## 2020-11-06 NOTE — OPERATIVE REPORT
Operative Report


DATE OF SURGERY: 11/06/20


PREOPERATIVE DIAGNOSIS: Stage IV pancreatic CA needed Chemo-Port for chemothera

py


POSTOPERATIVE DIAGNOSIS: Same


OPERATION: Placement of Chemo-Port through the left subclavian vein approach 

with the aid of ultrasound and fluoroscopy


SURGEON: ARMANDO CLEMENT


ANESTHESIA: LMAC


TISSUE REMOVED OR ALTERED: None


COMPLICATIONS: 





None


ESTIMATED BLOOD LOSS: 5 cc


QUANTITATIVE BLOOD LOSS: 5


INTRAOPERATIVE FINDINGS: Normal right subclavian vein on ultrasound


PROCEDURE: 





Adequate IV sedation patient was placed in supine position and upper chest was 

then prepped and draped in usual sterile fashion.  Appropriate timeout was then 

called.  With use of the ultrasound left subclavian vein was then identified and

noted to be at fairly good caliber compressible essentially normal.  Local 

anesthesia was then infiltrated on the left infraclavicular area and the left 

subclavian vein was then punctured and blood came to a distance of about 33cm.  

It was then connected to the Chemo-Port.  Chemo-Port was then placed underneath 

the subcutaneous area laid nicely and subsequently aspirated blood from the 

Chemo-Port through the skin with needle and also instilled heparinized saline 

about 4 cc.  Subcu incision was then closed with subcuticular closure using 3-0 

Vicryl.  Infraclavicular incision also closed with 3-0 Vicryl.  Steri-Strips 

were then placed over the operative sites.  Needle instrument sponge counts were

all correct.  Patient brought to the recovery room in satisfactory condition.  A

chest x-ray will be obtained in the PACU for final evaluation of placement and 

to rule out any pneumothorax.  As dark and nonpulsatile.  Guidewire was then 

passed through the needle towards the superior vena cava this is confirmed with 

fluoroscopy.  Needle was removed and puncture site enlarged to about 6 mm with 

an 11 blade.  At this point local anesthesia also infiltrated along the left 

upper chest more towards the sternum and also anesthesia infiltrated along the 

potential path of the catheter tunneler.  Next a 2-1/2 cm incision made over the

chest and subcu dissected for placement of the Chemo-Port.  Next tunneler was 

then passed from the infraclavicular incision to the chest incision pulling 

single lumen Chemo-Port catheter.  The catheter was then cut distally.  Next a 

dilator and sheath were then passed over the guidewire towards the superior vena

cava.  The dilator was removed and end of the Chemo-Port catheter was then 

threaded through the sheath after about 20 cm.  The sheath was removed and the 

catheter was kept in place with forceps.  The catheter appears to be in good 

position on fluoroscopy at the cavoatrial junction.  Next the other end of the 

catheter below the chest was then connected to the Chemo-Port and laid inside 

the subcutaneous pocket.  The subcu incisions were then closed with subcuticular

3-0 Vicryl undyed.  Steri-Strips placed over the operative sites.  Patient 

tolerated procedure well and brought to PACU in satisfactory condition.  Chest 

x-ray will be obtained for final evaluation of the port and to rule out any 

pneumothorax.

## 2020-11-06 NOTE — PDOC CONSULTATION
Consultation


Consult Date: 11/06/20


Attending physician:: KIM STONE


Provider Consulted: CHERRY JAVIER


Consult reason:: Known history of stage IV pancreatic cancer here with severe 

anemia, weakness, dehydration





History of Present Illness


Admission Date/PCP: 


  11/05/20 13:44





  CHERRY JAVIER MD





Patient complains of: Weakness, dehydration


History of Present Illness: 


LEON WARNER is a 50 year old male with known history of stage IV pancreatic 

cancer, recently he presented to [facility] with painful jaundice, weakness, weight 

loss and was seen on CT imaging to have a large pancreatic head mass nearly 6 cm

along with liver lesions, lung lesions, patient was transferred to [facility], and under the care of surgical oncology with Dr. Osvaldo Calvin patient had 

initial attempt at ERCP and stenting of the common bile duct, unfortunately this

was not possible so hepaticoduodenostomy drainage was placed, biopsy was done of

the head of the pancreas which returned poorly differentiated adenocarcinoma, 

initial bilirubin upon admission there was nearly 14, today it is normal.  

Unfortunately, he has been very weak and cachectic, poor p.o. intake, hemoglobin

was in the 7 range and was admitted for transfusion and hydration.  Patient had 

1 unit of packed red blood cell, hemoglobin improved to the 9 range.  He is 

feeling a little bit better.  I consulted surgery for port placement as he was 

supposed to have this.  We decided on emergent port placement so that we could 

initiate therapy as soon as possible.








Past Medical History


Cardiac Medical History: Reports: None


   Denies: Congestive Heart Failure, Myocardial Infarction, Hypertension


Pulmonary Medical History: Reports: None


   Denies: Asthma, Bronchitis, Chronic Obstructive Pulmonary Disease (COPD), 

Pneumonia, Tuberculosis


EENT Medical History: Reports: None


Neurological Medical History: Reports: None


   Denies: Seizures


Endocrine Medical History: Reports: None


Renal/ Medical History: Reports: None


   Denies: End Stage Renal Disease


Malignancy Medical History: Reports: Pancreatic Cancer


GI Medical History: Reports: None


   Denies: Cirrhosis, Gastroesophageal Reflux Disease


Musculoskeltal Medical History: Reports: None


   Denies: Arthritis


Skin Medical History: Reports: None


Psychiatric Medical History: Reports: None


   Denies: Bipolar Disorder, Depression


Traumatic Medical History: Reports: None


Hematology: Reports: None


   Denies: Anemia, Bleeding Tendencies


Infectious Medical History: Reports: None





Past Surgical History


Past Surgical History: Reports: Other - Pigtail catheter from common bile duct 

to stomach.





Social History


Lives with: Alone


Smoking Status: Never Smoker


Electronic Cigarette use?: No


Frequency of Alcohol Use: None


Hx Recreational Drug Use: No


Drugs: None


Hx Prescription Drug Abuse: No





- Advance Directive


Resuscitation Status: Full Code





Family History


Family History: Reviewed & Not Pertinent, Other - Significant for pancreatic 

cancer in grandmother.  denies: CAD


Parental Family History Reviewed: Yes


Children Family History Reviewed: Yes


Sibling(s) Family History Reviewed.: Yes





Medication/Allergy


Home Medications: 








Alprazolam [Xanax 0.5 mg Tablet] 0.5 mg PO DAILY 11/05/20 


Fluoxetine HCl [Prozac] 40 mg PO DAILY 11/05/20 


Ondansetron [Zofran Odt 4 mg Tablet] 4 mg PO Q8HP PRN 11/05/20 


Oxycodone HCl [Oxy-Ir 5 mg Tablet] 5 mg PO Q6HP PRN 11/05/20 


Pantoprazole Sodium [Protonix 40 mg Dr Tablet] 40 mg PO DAILY 11/05/20 


Sucralfate [Carafate 1 gm Tablet] 1 gm PO ACHS 11/05/20 








Allergies/Adverse Reactions: 


                                        





No Known Allergies Allergy (Verified 11/05/20 08:58)


   











Review of Systems


Constitutional: ABSENT: chills, fever(s), headache(s), weight gain, weight loss


Eyes: ABSENT: visual disturbances


Ears: ABSENT: hearing changes


Cardiovascular: ABSENT: chest pain, dyspnea on exertion, edema, orthropnea, 

palpitations


Respiratory: ABSENT: cough, hemoptysis


Gastrointestinal: ABSENT: abdominal pain, constipation, diarrhea, hematemesis, 

hematochezia, nausea, vomiting


Genitourinary: ABSENT: dysuria, hematuria


Musculoskeletal: ABSENT: joint swelling


Integumentary: ABSENT: rash, wounds


Neurological: ABSENT: abnormal gait, abnormal speech, confusion, dizziness, 

focal weakness, syncope


Psychiatric: ABSENT: anxiety, depression, homidical ideation, suicidal ideation


Endocrine: ABSENT: cold intolerance, heat intolerance, polydipsia, polyuria


Hematologic/Lymphatic: ABSENT: easy bleeding, easy bruising





Physical Exam


Vital Signs: 


                                        











Temp Pulse Resp BP Pulse Ox


 


 98.2 F   84   16   101/60   97 


 


 11/05/20 20:00  11/05/20 20:00  11/05/20 20:00  11/05/20 20:00  11/05/20 20:00








                                 Intake & Output











 11/05/20 11/06/20 11/07/20





 06:59 06:59 06:59


 


Intake Total  2300 


 


Balance  2300 


 


Weight  58.06 kg 











General appearance: PRESENT: no acute distress, well-developed, well-nourished


Head exam: PRESENT: atraumatic, normocephalic


Eye exam: PRESENT: conjunctiva pink, EOMI, PERRLA.  ABSENT: scleral icterus


Ear exam: PRESENT: normal external ear exam


Mouth exam: PRESENT: moist, tongue midline


Neck exam: ABSENT: carotid bruit, JVD, lymphadenopathy, thyromegaly


Respiratory exam: PRESENT: clear to auscultation destiny.  ABSENT: rales, rhonchi, 

wheezes


Cardiovascular exam: PRESENT: RRR.  ABSENT: diastolic murmur, rubs, systolic mu

rmur


Pulses: PRESENT: normal dorsalis pedis pul


Vascular exam: PRESENT: normal capillary refill


GI/Abdominal exam: PRESENT: normal bowel sounds, soft.  ABSENT: distended, 

guarding, mass, organolmegaly, rebound, tenderness


Rectal exam: PRESENT: deferred


Extremities exam: PRESENT: full ROM.  ABSENT: calf tenderness, clubbing, pedal 

edema


Neurological exam: PRESENT: alert, awake, oriented to person, oriented to place,

oriented to time, oriented to situation, CN II-XII grossly intact.  ABSENT: 

motor sensory deficit


Psychiatric exam: PRESENT: appropriate affect, normal mood.  ABSENT: homicidal 

ideation, suicidal ideation


Skin exam: PRESENT: dry, intact, warm.  ABSENT: cyanosis, rash





Results


Laboratory Results: 


                                        





                                 11/06/20 04:30 





                                 11/06/20 04:30 





                                        











  11/05/20 11/05/20 11/05/20





  08:45 08:45 08:45


 


WBC  22.3 H  


 


RBC  2.96 L  


 


Hgb  8.3 L  


 


Hct  25.1 L  


 


MCV  85  


 


MCH  28.1  


 


MCHC  33.0  


 


RDW  15.4 H  


 


Plt Count  473 H  


 


Seg Neutrophils %  Not Reportable  


 


VBG pH    7.44 H


 


VBG pCO2    37.9


 


VBG HCO3    25.3


 


VBG Base Excess    1.1


 


Sodium   137.3 


 


Potassium   4.4 


 


Chloride   102 


 


Carbon Dioxide   26 


 


Anion Gap   9 


 


BUN   12 


 


Creatinine   0.56 


 


Est GFR ( Amer)   > 60 


 


Glucose   127 H 


 


Lactic Acid   


 


Calcium   8.6 


 


Total Bilirubin   1.2 


 


AST   21 


 


Alkaline Phosphatase   508 H 


 


Total Protein   6.2 L 


 


Albumin   2.6 L 


 


Urine Color   


 


Urine Appearance   


 


Urine pH   


 


Ur Specific Gravity   


 


Urine Protein   


 


Urine Glucose (UA)   


 


Urine Ketones   


 


Urine Blood   


 


Urine Nitrite   


 


Ur Leukocyte Esterase   


 


Urine WBC (Auto)   


 


Urine RBC (Auto)   


 


Blood Type   


 


Antibody Screen   














  11/05/20 11/05/20 11/05/20





  08:45 11:15 14:30


 


WBC   


 


RBC   


 


Hgb   


 


Hct   


 


MCV   


 


MCH   


 


MCHC   


 


RDW   


 


Plt Count   


 


Seg Neutrophils %   


 


VBG pH   


 


VBG pCO2   


 


VBG HCO3   


 


VBG Base Excess   


 


Sodium   


 


Potassium   


 


Chloride   


 


Carbon Dioxide   


 


Anion Gap   


 


BUN   


 


Creatinine   


 


Est GFR (African Amer)   


 


Glucose   


 


Lactic Acid  1.7  1.1  0.9


 


Calcium   


 


Total Bilirubin   


 


AST   


 


Alkaline Phosphatase   


 


Total Protein   


 


Albumin   


 


Urine Color   


 


Urine Appearance   


 


Urine pH   


 


Ur Specific Gravity   


 


Urine Protein   


 


Urine Glucose (UA)   


 


Urine Ketones   


 


Urine Blood   


 


Urine Nitrite   


 


Ur Leukocyte Esterase   


 


Urine WBC (Auto)   


 


Urine RBC (Auto)   


 


Blood Type   


 


Antibody Screen   














  11/05/20 11/05/20 11/05/20





  14:30 14:30 22:44


 


WBC    20.5 H


 


RBC    3.10 L


 


Hgb    9.0 L


 


Hct    26.0 L


 


MCV    84


 


MCH    29.0


 


MCHC    34.7


 


RDW    15.2 H


 


Plt Count    445


 


Seg Neutrophils %    Not Reportable


 


VBG pH   


 


VBG pCO2   


 


VBG HCO3   


 


VBG Base Excess   


 


Sodium   


 


Potassium   


 


Chloride   


 


Carbon Dioxide   


 


Anion Gap   


 


BUN   


 


Creatinine   


 


Est GFR (African Amer)   


 


Glucose   


 


Lactic Acid   


 


Calcium   


 


Total Bilirubin   


 


AST   


 


Alkaline Phosphatase   


 


Total Protein   


 


Albumin   


 


Urine Color   YELLOW 


 


Urine Appearance   CLEAR 


 


Urine pH   7.0 


 


Ur Specific Gravity   1.041 


 


Urine Protein   NEGATIVE 


 


Urine Glucose (UA)   NEGATIVE 


 


Urine Ketones   NEGATIVE 


 


Urine Blood   NEGATIVE 


 


Urine Nitrite   NEGATIVE 


 


Ur Leukocyte Esterase   NEGATIVE 


 


Urine WBC (Auto)   1 


 


Urine RBC (Auto)   1 


 


Blood Type  A POSITIVE  


 


Antibody Screen  NEGATIVE  














  11/06/20 11/06/20





  04:30 04:30


 


WBC  21.6 H 


 


RBC  3.11 L 


 


Hgb  8.9 L 


 


Hct  26.1 L 


 


MCV  84 


 


MCH  28.6 


 


MCHC  34.1 


 


RDW  15.5 H 


 


Plt Count  440 


 


Seg Neutrophils %  Not Reportable 


 


VBG pH  


 


VBG pCO2  


 


VBG HCO3  


 


VBG Base Excess  


 


Sodium   135.4 L


 


Potassium   4.2


 


Chloride   104


 


Carbon Dioxide   22


 


Anion Gap   9


 


BUN   8


 


Creatinine   0.50 L


 


Est GFR (African Amer)   > 60


 


Glucose   96


 


Lactic Acid  


 


Calcium   8.5


 


Total Bilirubin   1.3


 


AST   23


 


Alkaline Phosphatase   422 H


 


Total Protein   5.8 L


 


Albumin   2.4 L


 


Urine Color  


 


Urine Appearance  


 


Urine pH  


 


Ur Specific Gravity  


 


Urine Protein  


 


Urine Glucose (UA)  


 


Urine Ketones  


 


Urine Blood  


 


Urine Nitrite  


 


Ur Leukocyte Esterase  


 


Urine WBC (Auto)  


 


Urine RBC (Auto)  


 


Blood Type  


 


Antibody Screen  











Impressions: 


                                        





Chest X-Ray  11/05/20 00:00


IMPRESSION:  1.  Low lung volumes and patient rotation limiting the examination 

somewhat.  Question of superimposition of structures versus vague infiltrate 

right infrahilar region.


 








Abdomen/Pelvis CT  11/05/20 10:14


IMPRESSION:  1.  Once again there is a large mass in the head of the pancreas 

with extensive hepatic metastases.  There is a pigtail catheter in the upper 

abdomen that appears to extend from the common bile duct to the stomach.  There 

is no information regarding this.


2.  Constipation.


3.  No other significant finding in the abdomen or pelvis.


 














Assessment & Plan





- Diagnosis


(1) Nausea & vomiting


Qualifiers: 


   Vomiting type: unspecified   Vomiting Intractability: intractable   Qualified

Code(s): R11.2 - Nausea with vomiting, unspecified   


Is this a current diagnosis for this admission?: Yes   


Plan: 


Vomiting secondary to pancreatic cancer, better with hydration and antiemetics 

IV.  But will continue as an outpatient because of the mass, this will only be 

improved by systemic chemotherapy which should initiate as soon as possible.








(2) Anemia


Qualifiers: 


   Anemia type: other cause   Other causes of anemia: chronic disease, rohit

plastic   Qualified Code(s): D63.0 - Anemia in neoplastic disease   


Is this a current diagnosis for this admission?: Yes   


Plan: 


Hemoglobin improved post transfusion.  Would benefit from another unit of packed

red blood cell.








(3) Pancreatic cancer metastasized to liver


Is this a current diagnosis for this admission?: Yes   


Plan: 


Port placement today, we will begin treatment hopefully soon.








- Time


Time Spent: Greater than 70 Minutes

## 2020-11-06 NOTE — RADIOLOGY REPORT (SQ)
EXAM DESCRIPTION:  CHEST SINGLE VIEW



IMAGES COMPLETED DATE/TIME:  11/6/2020 12:40 pm



REASON FOR STUDY:  Placement of Chemo-Port and rule out pneumothorax



COMPARISON:  11/5/2020



EXAM PARAMETERS:  NUMBER OF VIEWS: One view.

TECHNIQUE: Single frontal radiographic view of the chest acquired.

RADIATION DOSE: NA

LIMITATIONS: None.



FINDINGS:  LUNGS AND PLEURA: Left-sided port is in place.  Catheter tip overlies the SVC.  No pneumot
horax.  Minimal left retrocardiac atelectasis.

MEDIASTINUM AND HILAR STRUCTURES: No masses.  Contour normal.

HEART AND VASCULAR STRUCTURES: Heart normal in size.  Normal vasculature.

BONES: No acute findings.

HARDWARE: None in the chest.

OTHER: No other significant finding.



IMPRESSION:  Minimal left retrocardiac atelectasis status post left-sided port placement.  Catheter t
ip overlies the SVC.



TECHNICAL DOCUMENTATION:  JOB ID:  [id number]

 2011 [facility]- All Rights Reserved



Reading location - IP/workstation name: [email or web address]

## 2020-11-06 NOTE — PDOC PROGRESS REPORT
Subjective


Progress Note for:: 11/06/20


Subjective:: 


LEON WARNER is a 50 year old male with a past medical history significant for 

recently diagnosed pancreatic cancer with liver metastasis who was admitted with

hypotension, nausea vomiting, abdominal discomfort, anemia, all secondary to 

pancreatic cancer with metastasis to the liver.





Patient is seen on afternoon rounds.  Is found resting in bed, comfortably, on 

room air.  He reports generalized malaise and profound fatigue.  He reports 

continued nausea but no further episodes of emesis.  He is interested in trying 

brat diet.


Otherwise, he denies fever, chills, chest pain, palpitations, dyspnea, orthopnea

and cough.


He has no questions or concerns at this time.


No concerns per nursing.


Reason For Visit: 


HYPOTENSION, N/V








Physical Exam


Vital Signs: 


                                        











Temp Pulse Resp BP Pulse Ox


 


 98.3 F   88   16   94/48 L  94 


 


 11/06/20 15:10  11/06/20 15:10  11/06/20 15:10  11/06/20 15:10  11/06/20 15:10








                                 Intake & Output











 11/05/20 11/06/20 11/07/20





 06:59 06:59 06:59


 


Intake Total  2300 450


 


Output Total   3


 


Balance  2300 447


 


Weight  58.06 kg 











General appearance: PRESENT: no acute distress, cooperative, thin, well-

developed, well-nourished


Head exam: PRESENT: atraumatic, normocephalic


Eye exam: PRESENT: conjunctiva pink, EOMI, PERRLA.  ABSENT: scleral icterus


Mouth exam: PRESENT: moist, tongue midline


Teeth exam: PRESENT: edentulous


Respiratory exam: PRESENT: clear to auscultation destiny, symmetrical, unlabored.  

ABSENT: rales, rhonchi, wheezes


Cardiovascular exam: PRESENT: RRR, +S1, +S2.  ABSENT: diastolic murmur, rubs, 

systolic murmur


Vascular exam: PRESENT: normal capillary refill


GI/Abdominal exam: PRESENT: normal bowel sounds, soft, tenderness - vague, 

generalized.  ABSENT: distended


Rectal exam: PRESENT: deferred


Extremities exam: PRESENT: full ROM.  ABSENT: calf tenderness, clubbing, pedal 

edema


Musculoskeletal exam: PRESENT: ambulatory


Neurological exam: PRESENT: alert, awake, oriented to person, oriented to place,

oriented to time, oriented to situation, CN II-XII grossly intact.  ABSENT: 

motor sensory deficit


Psychiatric exam: PRESENT: appropriate affect, normal mood.  ABSENT: homicidal 

ideation, suicidal ideation


Skin exam: PRESENT: dry, intact, warm.  ABSENT: cyanosis, rash





Results


Laboratory Results: 


                                        





                                 11/06/20 04:30 





                                 11/06/20 04:30 





                                        











  11/05/20 11/05/20 11/06/20





  14:30 22:44 04:30


 


WBC   20.5 H  21.6 H


 


RBC   3.10 L  3.11 L


 


Hgb   9.0 L  8.9 L


 


Hct   26.0 L  26.1 L


 


MCV   84  84


 


MCH   29.0  28.6


 


MCHC   34.7  34.1


 


RDW   15.2 H  15.5 H


 


Plt Count   445  440


 


Seg Neutrophils %   Not Reportable  Not Reportable


 


Sodium   


 


Potassium   


 


Chloride   


 


Carbon Dioxide   


 


Anion Gap   


 


BUN   


 


Creatinine   


 


Est GFR (African Amer)   


 


Glucose   


 


Calcium   


 


Total Bilirubin   


 


AST   


 


Alkaline Phosphatase   


 


Total Protein   


 


Albumin   


 


Blood Type  A POSITIVE  


 


Antibody Screen  NEGATIVE  














  11/06/20





  04:30


 


WBC 


 


RBC 


 


Hgb 


 


Hct 


 


MCV 


 


MCH 


 


MCHC 


 


RDW 


 


Plt Count 


 


Seg Neutrophils % 


 


Sodium  135.4 L


 


Potassium  4.2


 


Chloride  104


 


Carbon Dioxide  22


 


Anion Gap  9


 


BUN  8


 


Creatinine  0.50 L


 


Est GFR (African Amer)  > 60


 


Glucose  96


 


Calcium  8.5


 


Total Bilirubin  1.3


 


AST  23


 


Alkaline Phosphatase  422 H


 


Total Protein  5.8 L


 


Albumin  2.4 L


 


Blood Type 


 


Antibody Screen 











Impressions: 


                                        





Abdomen/Pelvis CT  11/05/20 10:14


IMPRESSION:  1.  Once again there is a large mass in the head of the pancreas 

with extensive hepatic metastases.  There is a pigtail catheter in the upper a

bdomen that appears to extend from the common bile duct to the stomach.  There 

is no information regarding this.


2.  Constipation.


3.  No other significant finding in the abdomen or pelvis.


 








Chest X-Ray  11/06/20 00:00


IMPRESSION:  Minimal left retrocardiac atelectasis status post left-sided port 

placement.  Catheter tip overlies the SVC.


 








Guidance Fluoroscopy  11/06/20 00:00


IMPRESSION:  IMAGE(S) OBTAINED DURING PROCEDURE.


 














Assessment and Plan





- Diagnosis


(1) Hypotension


Is this a current diagnosis for this admission?: Yes   


Plan: 


Overall unchanged despite >3L IVF.


Possibly nmfor patient's current body weight/stature.  May also be r/t 

malignance.


Random and AM cortisol levels were nml.





Continue maintenance IV fluids.


Receiving PRBC for anemia.


We will check orthostatic blood pressures every shift.


Consider midodrine.








(2) Nausea & vomiting


Qualifiers: 


   Vomiting type: unspecified   Vomiting Intractability: intractable   Qualified

Code(s): R11.2 - Nausea with vomiting, unspecified   


Is this a current diagnosis for this admission?: Yes   


Plan: 


Improved. Now with slight nausea but no further episodes of emesis.


Will cautiously advance to a LOUIE diet.


Antiemetics as needed.








(3) Anemia


Qualifiers: 


   Anemia type: other cause   Other causes of anemia: chronic disease, 

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   


Is this a current diagnosis for this admission?: Yes   


Plan: 


Heme/Onc consulted; primary plan per Dr. Quintero,


Plan for 2 units PRBC.


Follow-up CBC.








(4) Pancreatic cancer metastasized to liver


Is this a current diagnosis for this admission?: Yes   


Plan: 


Follows with Dr. Quintero; consulted.


Primary management per oncology.


Surgery was consulted for Port placement.  Appreciate Dr. Darnell's assistance.








- Time


Time Spent with patient: 25-34 minutes


Medications reviewed and adjusted accordingly: Yes


Anticipated Discharge Disposition: Home, Self Care


Anticipated Discharge Timeframe: within 24 hours

## 2020-11-06 NOTE — RADIOLOGY REPORT (SQ)
EXAM DESCRIPTION:  FLUORO/CV PLACEMENT



IMAGES COMPLETED DATE/TIME:  11/6/2020 2:33 pm



REASON FOR STUDY:  PORTACATH PLCMT LEFT SIDE ASSISTED WITH FLUORO IN OR



COMPARISON:  None.



FLUOROSCOPY TIME:  0.5 minutes

Spot images saved to PACS.



TECHNIQUE:  Intra-operative images acquired during surgical procedure to evaluate progress.

NUMBER OF IMAGES: 4



LIMITATIONS:  None.



FINDINGS:  Fluoroscopy was provided for intraoperative procedure.  Please refer to the operative repo
rt for further discussion.



IMPRESSION:  IMAGE(S) OBTAINED DURING PROCEDURE.



COMMENT:  Quality :  Final reports for procedures using fluoroscopy that document radiation exp
osure indices, or exposure time and number of fluorographic images (if radiation exposure indices are
 not available)

Please consult full operative report of the attending physician for description of the procedure.



TECHNICAL DOCUMENTATION:  JOB ID:  [id number]

 2011 [employer]- All Rights Reserved



Reading location - IP/workstation name: [email or web address]

## 2020-11-07 VITALS — SYSTOLIC BLOOD PRESSURE: 101 MMHG | DIASTOLIC BLOOD PRESSURE: 60 MMHG

## 2020-11-07 LAB
ANION GAP SERPL CALC-SCNC: 11 MMOL/L (ref 5–19)
BUN SERPL-MCNC: 10 MG/DL (ref 7–20)
CALCIUM: 8.7 MG/DL (ref 8.4–10.2)
CHLORIDE SERPL-SCNC: 104 MMOL/L (ref 98–107)
CO2 SERPL-SCNC: 22 MMOL/L (ref 22–30)
ERYTHROCYTE [DISTWIDTH] IN BLOOD BY AUTOMATED COUNT: 15.6 % (ref 11.5–14)
GLUCOSE SERPL-MCNC: 94 MG/DL (ref 75–110)
HCT VFR BLD CALC: 31.8 % (ref 37.9–51)
HGB BLD-MCNC: 10.8 G/DL (ref 13.5–17)
MCH RBC QN AUTO: 28.5 PG (ref 27–33.4)
MCHC RBC AUTO-ENTMCNC: 33.8 G/DL (ref 32–36)
MCV RBC AUTO: 84 FL (ref 80–97)
PLATELET # BLD: 412 10^3/UL (ref 150–450)
POTASSIUM SERPL-SCNC: 4.5 MMOL/L (ref 3.6–5)
RBC # BLD AUTO: 3.77 10^6/UL (ref 4.35–5.55)
WBC # BLD AUTO: 25.7 10^3/UL (ref 4–10.5)

## 2020-11-07 RX ADMIN — SUCRALFATE SCH GM: 1 TABLET ORAL at 07:51

## 2020-11-07 RX ADMIN — PANTOPRAZOLE SODIUM SCH MG: 40 INJECTION, POWDER, FOR SOLUTION INTRAVENOUS at 09:22

## 2020-11-07 RX ADMIN — SUCRALFATE SCH: 1 TABLET ORAL at 11:49

## 2020-11-07 RX ADMIN — FLUOXETINE SCH MG: 20 CAPSULE ORAL at 09:21

## 2020-11-07 RX ADMIN — SODIUM CHLORIDE PRN MLS/HR: 9 INJECTION, SOLUTION INTRAVENOUS at 04:37

## 2020-11-07 RX ADMIN — DOCUSATE SODIUM SCH MG: 100 CAPSULE, LIQUID FILLED ORAL at 09:22

## 2020-11-07 RX ADMIN — OXYCODONE HYDROCHLORIDE PRN MG: 5 TABLET ORAL at 07:51

## 2020-11-07 RX ADMIN — PROMETHAZINE HYDROCHLORIDE PRN MG: 25 INJECTION INTRAMUSCULAR; INTRAVENOUS at 04:36

## 2020-11-07 NOTE — PDOC DISCHARGE SUMMARY
Impression





- Admit/DC Date/PCP


Admission Date/Primary Care Provider: 


  11/05/20 13:44





  CHERRY JAVIER MD





Discharge Date: 11/07/20





- Discharge Diagnosis


(1) Hypotension


Is this a current diagnosis for this admission?: Yes   





(2) Nausea & vomiting


Is this a current diagnosis for this admission?: Yes   





(3) Anemia


Is this a current diagnosis for this admission?: Yes   





(4) Pancreatic cancer metastasized to liver


Is this a current diagnosis for this admission?: Yes   





- Additional Information


Resuscitation Status: Full Code


Discharge Diet: As Tolerated


Discharge Activity: Activity As Tolerated, Balance Activity w/Rest, Slowly 

Increase Activity


Referrals: 


CHERRY JAVIER MD [Primary Care Provider] -  (Follow up with Dr. Javier on 

Monday.)


Home Medications: 








Alprazolam [Xanax 0.5 mg Tablet] 0.5 mg PO DAILY 11/05/20 


Fluoxetine HCl [Prozac] 40 mg PO DAILY 11/05/20 


Ondansetron [Zofran Odt 4 mg Tablet] 4 mg PO Q8HP PRN 11/05/20 


Oxycodone HCl [Oxy-Ir 5 mg Tablet] 5 mg PO Q6HP PRN 11/05/20 


Pantoprazole Sodium [Protonix 40 mg Dr Tablet] 40 mg PO DAILY 11/05/20 


Sucralfate [Carafate 1 gm Tablet] 1 gm PO ACHS 11/05/20 


Acetaminophen [Tylenol 325 mg Tablet] 650 mg PO Q4HP PRN  tablet 11/07/20 











History of Present Illiness


History of Present Illness: 


LEON WARNER is a 50 year old male with a past medical history significant for 

recently diagnosed pancreatic cancer with liver metastasis who presented to the 

emergency department today with a complaint of 1 day of nausea and vomiting; and

able to tolerate p.o. fluids.  He denies worsened abdominal discomfort from his 

baseline, diarrhea and constipation.  He further denies fever, chills, chest 

pain, palpitations, dyspnea, orthopnea.


Evaluation in the emergency department revealed hypotension (89/63), tachypnea 

(RR 42), maintaining oxygen saturations on room air and otherwise normal vital 

signs.


CBC revealed leukocytosis (WBCs 22.3), anemia (hemoglobin 8.3; decreased from 

12.1 last month), and thrombocytosis ().  INR is slightly elevated 1.34. 

CBG is acceptable.  Chemistry is notable for mildly elevated alk phos.  Lactic 

acid normal.


CT abdomen pelvis demonstrated large mass to the head of the pancreas with 

extensive hepatic metastasis and a pigtail catheter in the right upper abdomen 

that appears to extend from the common bile duct to the stomach.  Patient 

confirms that this was placed while at [facility] last month.  He is noted to have 

constipation but no other acute findings.


She was provided IV fluids and referred to the hospital service for further 

evaluation management of the above-stated complaints and findings.





Hospital Course


Hospital Course: 


(1) Hypotension


Resolved.


Secondary to dehydration/fluid volume deficit.


Random and AM cortisol levels were nml.





Received generous IVF; approximately 4.5L.


Receiving 2 units PRBC for anemia.





(2) Nausea & vomiting


Resolved; now tolerating a LOUIE diet with adequate oral intake.


Received IVF and as needed antiemetics.





(3) Anemia


hgb 8.3-> 9.0-> 8.9-> 10.8


Heme/Onc consulted; primary plan per Dr. Javier,


s/p units PRBC.


Outpatient f/u with Dr. Javier.





(4) Pancreatic cancer metastasized to liver


Follows with Dr. Javier; outpatient follow up early next week.


Primary management per oncology.


Surgery was consulted for Port placement.  Appreciate Dr. Darnell's assistance.








Physical Exam


Vital Signs: 


                                        











Temp Pulse Resp BP Pulse Ox


 


 98.6 F   89   16   101/60   95 


 


 11/07/20 11:20  11/07/20 11:20  11/07/20 11:20  11/07/20 11:20  11/07/20 11:20








                                 Intake & Output











 11/06/20 11/07/20 11/08/20





 06:59 06:59 06:59


 


Intake Total 2300 2090 


 


Output Total  503 


 


Balance 2300 1587 


 


Weight 58.06 kg 61.4 kg 











General appearance: PRESENT: no acute distress, cooperative, thin, well-

developed


Head exam: PRESENT: atraumatic, normocephalic


Eye exam: PRESENT: conjunctiva pink, EOMI, PERRLA.  ABSENT: scleral icterus


Mouth exam: PRESENT: moist, tongue midline


Respiratory exam: PRESENT: clear to auscultation destiny, symmetrical, unlabored.  

ABSENT: rales, rhonchi, wheezes


Cardiovascular exam: PRESENT: RRR, +S1, +S2.  ABSENT: diastolic murmur, rubs, 

systolic murmur


Vascular exam: PRESENT: normal capillary refill


GI/Abdominal exam: PRESENT: normal bowel sounds, soft.  ABSENT: distended, 

guarding, mass, organolmegaly, rebound, tenderness


Rectal exam: PRESENT: deferred


Extremities exam: PRESENT: full ROM.  ABSENT: calf tenderness, clubbing, pedal 

edema


Musculoskeletal exam: PRESENT: ambulatory


Neurological exam: PRESENT: alert, awake, oriented to person, oriented to place,

oriented to time, oriented to situation, CN II-XII grossly intact.  ABSENT: 

motor sensory deficit


Psychiatric exam: PRESENT: appropriate affect, normal mood.  ABSENT: homicidal 

ideation, suicidal ideation


Skin exam: PRESENT: dry, intact, warm.  ABSENT: cyanosis, rash





Results


Laboratory Results: 


                                        











WBC  25.7 10^3/uL (4.0-10.5)  H  11/07/20  05:21    


 


RBC  3.77 10^6/uL (4.35-5.55)  L  11/07/20  05:21    


 


Hgb  10.8 g/dL (13.5-17.0)  L  11/07/20  05:21    


 


Hct  31.8 % (37.9-51.0)  L  11/07/20  05:21    


 


MCV  84 fl (80-97)   11/07/20  05:21    


 


MCH  28.5 pg (27.0-33.4)   11/07/20  05:21    


 


MCHC  33.8 g/dL (32.0-36.0)   11/07/20  05:21    


 


RDW  15.6 % (11.5-14.0)  H  11/07/20  05:21    


 


Plt Count  412 10^3/uL (150-450)   11/07/20  05:21    


 


Lymph % (Auto)  Not Reportable   11/06/20  04:30    


 


Mono % (Auto)  Not Reportable   11/06/20  04:30    


 


Eos % (Auto)  Not Reportable   11/06/20  04:30    


 


Baso % (Auto)  Not Reportable   11/06/20  04:30    


 


Absolute Neuts (auto)  Not Reportable   11/06/20  04:30    


 


Absolute Lymphs (auto)  Not Reportable   11/06/20  04:30    


 


Absolute Monos (auto)  Not Reportable   11/06/20  04:30    


 


Absolute Eos (auto)  Not Reportable   11/06/20  04:30    


 


Absolute Basos (auto)  Not Reportable   11/06/20  04:30    


 


Total Counted  100   11/06/20  04:30    


 


Seg Neutrophils %  Not Reportable   11/06/20  04:30    


 


Seg Neuts % (Manual)  87 % (42-78)  H  11/06/20  04:30    


 


Band Neutrophils %  1 % (3-5)  L  11/05/20  08:45    


 


Lymphocytes % (Manual)  7 % (13-45)  L  11/06/20  04:30    


 


Atypical Lymphs %  1 % (0)   11/05/20  08:45    


 


Monocytes % (Manual)  4 % (3-13)   11/06/20  04:30    


 


Eosinophils % (Manual)  1 % (0-6)   11/06/20  04:30    


 


Basophils % (Manual)  1 % (0-2)   11/06/20  04:30    


 


Abs Neuts (Manual)  18.8 10^3/uL (1.7-8.2)  H  11/06/20  04:30    


 


Abs Lymphs (Manual)  1.5 10^3/uL (0.5-4.7)   11/06/20  04:30    


 


Abs Monocytes (Manual)  0.9 10^3/uL (0.1-1.4)   11/06/20  04:30    


 


Absolute Eos (Manual)  0.2 10^3/uL (0.0-0.6)   11/06/20  04:30    


 


Abs Basophils (Manual)  0.2 10^3/uL (0.0-0.2)   11/06/20  04:30    


 


Toxic Vacuolation  PRESENT   11/05/20  22:44    


 


Platelet Comment  INCREASED   11/06/20  04:30    


 


Polychromasia  SLIGHT   11/05/20  22:44    


 


Poikilocytosis  SLIGHT   11/05/20  22:44    


 


Anisocytosis  SLIGHT   11/06/20  04:30    


 


Target Cells  SLIGHT   11/05/20  08:45    


 


Tear Drop Cells  SLIGHT   11/05/20  08:45    


 


Ovalocytes  1+   11/05/20  08:45    


 


PT  16.8 SEC (11.4-15.4)  H  11/05/20  08:45    


 


INR  1.34   11/05/20  08:45    


 


VBG pH  7.44  (7.30-7.42)  H  11/05/20  08:45    


 


VBG pCO2  37.9 mmHg (35-63)   11/05/20  08:45    


 


VBG HCO3  25.3 mmol/L (20-32)   11/05/20  08:45    


 


VBG Base Excess  1.1 mmol/L  11/05/20  08:45    


 


Sodium  137.1 mmol/L (137-145)   11/07/20  05:21    


 


Potassium  4.5 mmol/L (3.6-5.0)   11/07/20  05:21    


 


Chloride  104 mmol/L ()   11/07/20  05:21    


 


Carbon Dioxide  22 mmol/L (22-30)   11/07/20  05:21    


 


Anion Gap  11  (5-19)   11/07/20  05:21    


 


BUN  10 mg/dL (7-20)   11/07/20  05:21    


 


Creatinine  0.52 mg/dL (0.52-1.25)   11/07/20  05:21    


 


Est GFR ( Amer)  > 60  (>60)   11/07/20  05:21    


 


Est GFR (MDRD) Non-Af  > 60  (>60)   11/07/20  05:21    


 


Glucose  94 mg/dL ()   11/07/20  05:21    


 


Lactic Acid  0.9 mmol/L (0.7-2.1)   11/05/20  14:30    


 


Calcium  8.7 mg/dL (8.4-10.2)   11/07/20  05:21    


 


Total Bilirubin  1.3 mg/dL (0.2-1.3)   11/06/20  04:30    


 


Direct Bilirubin  0.8 mg/dL (0.0-0.4)  H  11/06/20  04:30    


 


Neonat Total Bilirubin  Not Reportable   11/06/20  04:30    


 


Neonat Direct Bilirubin  Not Reportable   11/06/20  04:30    


 


Neonat Indirect Bili  Not Reportable   11/06/20  04:30    


 


AST  23 U/L (17-59)   11/06/20  04:30    


 


ALT  21 U/L (<50)   11/06/20  04:30    


 


Alkaline Phosphatase  422 U/L ()  H  11/06/20  04:30    


 


Total Protein  5.8 g/dL (6.3-8.2)  L  11/06/20  04:30    


 


Albumin  2.4 g/dL (3.5-5.0)  L  11/06/20  04:30    


 


Random Cortisol  38.30 ug/dL (None Established)   11/05/20  08:45    


 


Cortisol AM Sample  20.30 ug/dL (4.46-22.7)   11/06/20  04:30    


 


Urine Color  YELLOW   11/05/20  14:30    


 


Urine Appearance  CLEAR   11/05/20  14:30    


 


Urine pH  7.0  (5.0-9.0)   11/05/20  14:30    


 


Ur Specific Gravity  1.041   11/05/20  14:30    


 


Urine Protein  NEGATIVE mg/dL (NEGATIVE)   11/05/20  14:30    


 


Urine Glucose (UA)  NEGATIVE mg/dL (NEGATIVE)   11/05/20  14:30    


 


Urine Ketones  NEGATIVE mg/dL (NEGATIVE)   11/05/20  14:30    


 


Urine Blood  NEGATIVE  (NEGATIVE)   11/05/20  14:30    


 


Urine Nitrite  NEGATIVE  (NEGATIVE)   11/05/20  14:30    


 


Urine Bilirubin  NEGATIVE  (NEGATIVE)   11/05/20  14:30    


 


Urine Urobilinogen  NEGATIVE mg/dL (<2.0)   11/05/20  14:30    


 


Ur Leukocyte Esterase  NEGATIVE  (NEGATIVE)   11/05/20  14:30    


 


Urine WBC (Auto)  1 /HPF  11/05/20  14:30    


 


Urine RBC (Auto)  1 /HPF  11/05/20  14:30    


 


Squamous Epi Cells Auto  <1 /HPF  11/05/20  14:30    


 


Urine Mucus (Auto)  RARE /LPF  11/05/20  14:30    


 


Urine Ascorbic Acid  NEGATIVE  (NEGATIVE)   11/05/20  14:30    


 


SARS-CoV-2 (PCR)  NEGATIVE  (NEGATIVE)   11/05/20  15:15    


 


Blood Type  A POSITIVE   11/05/20  14:30    


 


Blood Type Confirm  A POSITIVE   11/05/20  14:30    


 


Blood Type Confirm  A POSITIVE   11/05/20  14:30    


 


Antibody Screen  NEGATIVE   11/05/20  14:30    


 


Crossmatch  See Detail   11/05/20  14:30    











Impressions: 


                                        





Chest X-Ray  11/05/20 00:00


IMPRESSION:  1.  Low lung volumes and patient rotation limiting the examination 

somewhat.  Question of superimposition of structures versus vague infiltrate 

right infrahilar region.


 








Abdomen/Pelvis CT  11/05/20 10:14


IMPRESSION:  1.  Once again there is a large mass in the head of the pancreas 

with extensive hepatic metastases.  There is a pigtail catheter in the upper 

abdomen that appears to extend from the common bile duct to the stomach.  There 

is no information regarding this.


2.  Constipation.


3.  No other significant finding in the abdomen or pelvis.


 








Chest X-Ray  11/06/20 00:00


IMPRESSION:  Minimal left retrocardiac atelectasis status post left-sided port 

placement.  Catheter tip overlies the SVC.


 








Guidance Fluoroscopy  11/06/20 00:00


IMPRESSION:  IMAGE(S) OBTAINED DURING PROCEDURE.


 














Plan


Plan of Treatment: 


She is discharged home, in stable condition, into the care of family members.


He is advised to follow-up with Dr. Javier early next week.


He is instructed to drink plenty of fluids, eat as tolerated.


Take medications as prescribed.


Return to the emergency department, as needed, for concerning symptoms.


Time Spent: Greater than 30 Minutes





Stroke


Is this a Stroke Patient?: No





Acute Heart Failure


Is this a Heart Failure Patient?: No

## 2020-11-07 NOTE — PDOC PROGRESS REPORT
Subjective


Progress Note for:: 11/07/20


Subjective:: 





Patient state he is still having pain after port placement.  Some nausea, but 

believes he will cleared for discharge later today.  He feels well enough to go 

home and will follow-up with Dr. Quintero within the week to start chemo.  


Reason For Visit: 


HYPOTENSION, N/V








Physical Exam


Vital Signs: 


                                        











Temp Pulse Resp BP Pulse Ox


 


 98.6 F   89   16   113/64   95 


 


 11/07/20 08:07  11/07/20 07:39  11/07/20 07:39  11/07/20 07:39  11/07/20 07:39








                                 Intake & Output











 11/06/20 11/07/20 11/08/20





 06:59 06:59 06:59


 


Intake Total 2300 2090 


 


Output Total  503 


 


Balance 2300 1587 


 


Weight 58.06 kg 61.4 kg 











General appearance: PRESENT: no acute distress, thin


Head exam: PRESENT: normocephalic


Respiratory exam: PRESENT: unlabored


Neurological exam: PRESENT: alert, awake, oriented to person, oriented to place,

oriented to time, oriented to situation


Psychiatric exam: PRESENT: appropriate affect


Skin exam: PRESENT: normal color





Results


Laboratory Results: 


                                        





                                 11/07/20 05:21 





                                 11/07/20 05:21 





                                        











  11/05/20 11/07/20 11/07/20





  14:30 05:21 05:21


 


WBC   25.7 H 


 


RBC   3.77 L 


 


Hgb   10.8 L 


 


Hct   31.8 L 


 


MCV   84 


 


MCH   28.5 


 


MCHC   33.8 


 


RDW   15.6 H 


 


Plt Count   412 


 


Sodium    137.1


 


Potassium    4.5


 


Chloride    104


 


Carbon Dioxide    22


 


Anion Gap    11


 


BUN    10


 


Creatinine    0.52


 


Est GFR ( Amer)    > 60


 


Glucose    94


 


Calcium    8.7


 


Blood Type  A POSITIVE  


 


Antibody Screen  NEGATIVE  











Impressions: 


                                        





Abdomen/Pelvis CT  11/05/20 10:14


IMPRESSION:  1.  Once again there is a large mass in the head of the pancreas 

with extensive hepatic metastases.  There is a pigtail catheter in the upper 

abdomen that appears to extend from the common bile duct to the stomach.  There 

is no information regarding this.


2.  Constipation.


3.  No other significant finding in the abdomen or pelvis.


 








Chest X-Ray  11/06/20 00:00


IMPRESSION:  Minimal left retrocardiac atelectasis status post left-sided port 

placement.  Catheter tip overlies the SVC.


 








Guidance Fluoroscopy  11/06/20 00:00


IMPRESSION:  IMAGE(S) OBTAINED DURING PROCEDURE.


 














Assessment & Plan





- Diagnosis


(1) Nausea & vomiting


Qualifiers: 


   Vomiting type: unspecified   Vomiting Intractability: intractable   Qualified

Code(s): R11.2 - Nausea with vomiting, unspecified   


Is this a current diagnosis for this admission?: Yes   


Plan: 


Now controlled with meds. 








(2) Pancreatic cancer metastasized to liver


Is this a current diagnosis for this admission?: Yes   


Plan: 


Port now in place.  I agree with plans to discharge.  Start chemo as outpatient 

next week with Demi Quintero  








- Time


Time Spent with patient: Less than 15 minutes

## 2020-11-12 ENCOUNTER — HOSPITAL ENCOUNTER (OUTPATIENT)
Dept: HOSPITAL 62 - II | Age: 50
Discharge: HOME | End: 2020-11-12
Attending: INTERNAL MEDICINE
Payer: COMMERCIAL

## 2020-11-12 VITALS — SYSTOLIC BLOOD PRESSURE: 93 MMHG | DIASTOLIC BLOOD PRESSURE: 51 MMHG

## 2020-11-12 DIAGNOSIS — E86.0: Primary | ICD-10-CM

## 2020-11-12 PROCEDURE — 96360 HYDRATION IV INFUSION INIT: CPT

## 2020-11-13 ENCOUNTER — HOSPITAL ENCOUNTER (OUTPATIENT)
Dept: HOSPITAL 62 - II | Age: 50
Discharge: HOME | End: 2020-11-13
Attending: INTERNAL MEDICINE
Payer: COMMERCIAL

## 2020-11-13 VITALS — DIASTOLIC BLOOD PRESSURE: 55 MMHG | SYSTOLIC BLOOD PRESSURE: 93 MMHG

## 2020-11-13 DIAGNOSIS — E86.0: Primary | ICD-10-CM

## 2020-11-13 PROCEDURE — 96360 HYDRATION IV INFUSION INIT: CPT

## 2020-11-13 NOTE — XMS REPORT
Patient Summary Document

                          Created on:2020



Patient:LEON HAGAN

Sex:Male

:1970

External Reference #:[id number]





Demographics







                          Address                   [address]



                                                    



                                                    [place] [address]

 

                          Home Phone                [phone]

 

                          Preferred Language        en

 

                          Marital Status            Unknown

 

                          Judaism Affiliation     Unknown

 

                          Race                      Unknown

 

                          Additional Race(s)        Other



                                                    6-3

 

                          Ethnic Group              Unknown









Author







                          Organization              [facility]

 

                          Address                   [facility] [address]



                                                    [place] [address]

 

                          Phone                     [phone]









Care Team Providers







                    Name                Role                Phone

 

                    KAROLINE LOCKE Attending Clinician Unavailable

 

                    VALERIE VALDEZ     Attending Clinician Unavailable

 

                    GISSEL             Attending Clinician Unavailable

 

                    Leon BARFIELD        Attending Clinician Unavailable

 

                    TAMIE LOCKE       Admitting Clinician Unavailable









Allergies, Adverse Reactions, Alerts

This patient has no known allergies or adverse reactions.



Medications







       Ordered Filled Start  Stop   Current Ordering Indication Dosage Frequency

 Signature

                          Comments                  Components



      Medication Medication Date  Date  Medication? Clinician                   

(SIG)       



      Name  Name                                                        

 

      Heparin       2020       Yes                                       



      Sodium Lock                                                         



      Flush       00:00:                                                 



                  00                                                    

 

      Hydration       2020       Yes                                       



      1000mL NS                                                         



                  00:00:                                                 



                  00                                                    

 

      Phenergan       2020       Yes               1                       



                  -                                                  



                  00:00:                                                 



                  00                                                    

 

      Zofran       2020       Yes               1                       



                                                                    



                  00:00:                                                 



                  00                                                    

 

      ALPRAZolam       2020       Yes               1                       



                  -                                                  



                  00:00:                                                 



                  00                                                    

 

      Ondansetron       2020       Yes               1                       



                  -                                                  



                  00:00:                                                 



                  00                                                    

 

      oxyCODONE       2020       No                1                       



      HCl         -                                                  



                  00:00:                                                 



                  00                                                    

 

      oxyCODONE       2020       Yes               1                       



      HCl         -                                                  



                  00:00:                                                 



                  00                                                    

 

      pantoprazol       2020 2020- Yes               40mg        Take 1 Take 1

 



      e           0-16  11-15                               tablet (40 tablet 



      (PROTONIX)       00:00: 23:59                               mg total) (40 

mg 



      40 MG       00    :00                                 by mouth total) by 



      tablet                                                 daily. mouth 



                                                                  daily. 

 

      ondansetron       2020 2020- No                4mg         4 mg,       



      (ZOFRAN-ODT       0-15  10-15                               Oral,       



      )           17:00: 16:53                               Once, Thu       



      disintegrat       00    :00                                 10/15/20      

 



      ing tablet                                                 at 1700,       



      4 mg                                                  For 1       



                                                            dose<br>Ro       



                                                            utine       

 

      amoxicillin       2020 2020- No          intra-abdom 875mg       1 table

t       



      -clavulanat       0-15  10-20             inal              (875 mg),     

  



      e           10:00: 08:59                               Oral, 2       



      (AUGMENTIN)       00    :00                                 times a       



      875-125 mg                                                 day         



      per tablet                                                 (standard)     

  



      1 tablet                                                 , First       



                                                            dose on       



                                                            u         



                                                            10/15/20       



                                                            at 1000,       



                                                            For 5       



                                                            days<br>Gi       



                                                            ve With       



                                                            Food<br>Ro       



                                                            utine,       



                                                            Indication       



                                                            s:          



                                                            intra-abdo       



                                                            evans       

 

      pantoprazol       2020       No                40mg        40 mg,       



      e           0-15                                      Oral,       



      (PROTONIX)       09:00:                                     Daily       



      EC tablet       00                                        (standard)      

 



      40 mg                                                 , First       



                                                            dose on       



                                                            u         



                                                            10/15/20       



                                                            at          



                                                            0900<br>Ro       



                                                            utine       

 

      sucralfate       2020       No                1g          1 g, Oral,    

   



      (CARAFATE)       0-15                                      4 times a      

 



      tablet 1 g       07:30:                                     day         



                  00                                        (ACHS),       



                                                            First dose       



                                                            on u       



                                                            10/15/20       



                                                            at          



                                                            0730<br>Ro       



                                                            utine       

 

      FLUoxetine       2020 2020- Yes               40mg        Take 1 Take 1 



      (PROZAC) 40       0-15  12-14                               capsule capsul

e 



      MG capsule       00:00: 23:59                               (40 mg (40 mg 



                  00    :00                                 total) by total) by 



                                                            mouth mouth 



                                                            daily. daily. 

 

      sucralfate       2020- Yes               1g          Take 1 Take 1 



      (CARAFATE)       0-15  11-14                               tablet (1 table

t (1 



      1 gram       00:00: 23:59                               g total) g total) 



      tablet       00    :00                                 by mouth by mouth 



                                                            Four (4) Four (4) 



                                                            times a times a 



                                                            day   day   



                                                            (before (before 



                                                            meals and meals and 



                                                            nightly). nightly). 

 

      ondansetron       2020- Yes               4mg         Take 1 Take 1

 



      (ZOFRAN-ODT       0-15  10-29                               tablet (4 tabl

et (4 



      ) 4 MG       00:00: 23:59                               mg total) mg total

) 



      disintegrat       00    :00                                 by mouth by mo

uth 



      ing tablet                                                 every every 



                                                            eight (8) eight (8) 



                                                            hours as hours as 



                                                            needed for needed 



                                                            nausea for for   



                                                            up to 14 nausea 



                                                            days. for up to 



                                                                  14 days. 

 

      polyethylen       2020- Yes               17g         Mix 1 Mix 1 



      e glycol       0-15  10-25                               packet (17 packet

 



      (MIRALAX)       00:00: 23:59                               g) into 8 (17 g

) 



      17 gram       00    :00                                 ounces of into 8 



      packet                                                 liquid and ounces o

f 



                                                            drink by liquid 



                                                            mouth and drink 



                                                            daily for by mouth 



                                                            10 days. daily for 



                                                                  10 days. 

 

      amoxicillin       2020 2020- Yes         intra-abdom 1{tbl}       Take 1

 Take 1 



      -clavulanat       0-15  10-20             inal              tablet by tabl

et by 



      e           00:00: 23:59                               mouth Two mouth Two

 



      (AUGMENTIN)       00    :00                                 (2) times (2) 

times 



      875-125 mg                                                 a day for a day

 for 



      per tablet                                                 5 days. 5 days.

 

 

      oxyCODONE       2020- Yes               5mg         Take 1 Take 1 



      (ROXICODONE       0-15  10-20                               tablet (5 tabl

et (5 



      ) 5 MG       00:00: 23:59                               mg total) mg total

) 



      immediate       00    :00                                 by mouth by mout

h 



      release                                                 every four every 



      tablet                                                 (4) hours four (4) 



                                                            as needed hours as 



                                                            for pain needed 



                                                            for up to for pain 



                                                            5 days. for up to 



                                                                  5 days. 

 

      ondansetron       2020       No                4mg         4 mg,       



      (ZOFRAN)       0-14                                      Intravenou       



      injection 4       22:37:                                     s, Every 6   

    



      mg          19                                        hours PRN,       



                                                            nausea,       



                                                            vomiting,       



                                                            Starting       



                                                            Wed         



                                                            10/14/20       



                                                            at          



                                                            2237<br>Ro       



                                                            utine       

 

      calcium       2020- No                            200 mg       



      carbonate       0-14  10-                               Mechoopda        



      (TUMS)       20:00: 20:50                               calcium,       



      chewable       00    :00                                 Oral,       



      tablet 200                                                 Once, Wed      

 



      mg Mechoopda                                                 10/14/20       



      calcium                                                 at 2000,       



                                                            For 1       



                                                            dose<br>50       



                                                            0 mg        



                                                            calcium       



                                                            carbonate       



                                                            = 200 mg       



                                                            elemental       



                                                            calcium =       



                                                            1           



                                                            tablet<br>       



                                                            Routine       

 

      potassium       2020- No                21mmol       21 mmol,      

 



      phosphate       0-14  10-                               Intravenou      

 



      21 mmol in       06:00: 10:14                               s,          



      sodium       00    :00                                 Administer       



      chloride                                                 over 240       



      (NS) 0.9 %                                                 Minutes,       



      250 mL                                                 Once, Wed       



      infusion                                                 10/14/20       



                                                            at 0600,       



                                                            For 1       



                                                            dose<br>do       



                                                            not         



                                                            refrigerat       



                                                            e<br>Routi       



                                                            ne          

 

      sodium       2020- No                1000mL       1,000 mL,       



      chloride       0-13  10-                               Intravenou       



      0.9% (NS)       22:00: 23:37                               s,          



      bolus 1,000       00    :00                                 Administer    

   



      mL                                                    over 2       



                                                            Hours,       



                                                            Once, Tue       



                                                            10/13/20       



                                                            at 2200,       



                                                            For 1       



                                                            dose<br>Ro       



                                                            utine       

 

      pantoprazol       2020- No                40mg        40 mg,       



      e           0-13  10-14                               Intravenou       



      (PROTONIX)       20:00: 19:23                               s, Daily      

 



      injection       00    :40                                 (standard)      

 



      40 mg                                                 , First       



                                                            dose on       



                                                            Tue         



                                                            10/13/20       



                                                            at          



                                                            2000<br>Ro       



                                                            utine       

 

      heparin       2020       No                5000U       5,000       



      (porcine)       0-13                                      Units,       



      injection       18:00:                                     Subcutaneo     

  



      5,000 Units       00                                        us, Every     

  



                                                            8 hours       



                                                            scheduled,       



                                                            First dose       



                                                            on Tue       



                                                            10/13/20       



                                                            at          



                                                            1800<br>Fo       



                                                            r           



                                                            questionab       



                                                            le          



                                                            bleeding,       



                                                            contact       



                                                            the         



                                                            provider       



                                                            for         



                                                            further       



                                                            instructio       



                                                            ns prior       



                                                            to          



                                                            administra       



                                                            tion.&nbsp       



                                                            ;HIGH       



                                                            ALERT       



                                                            MEDICATION       



                                                            <br>Routin       



                                                            e           

 

      levofloxaci       2020 No          intra-abdom 500mg       500 mg,

       



      n           0-13  10-13             inal              Intravenou       



      (LEVAQUIN)       16:00: 16:27                               s, at 100     

  



      500 mg/100       00    :00                                 mL/hr,       



      mL IVPB 500                                                 Once, Tue     

  



      mg                                                    10/13/20       



                                                            at 1600,       



                                                            For 1       



                                                            dose<br>Ro       



                                                            utine,       



                                                            Indication       



                                                            s:          



                                                            intra-abdo       



                                                            evans, s/p       



                                                            hepaticoes       



                                                            ophagostom       



                                                            y           

 

      fentaNYL       2020 No                25ug        25 mcg,       



      (PF)        0-13  10-13                               Intravenou       



      (SUBLIMAZE)       14:59: 16:31                               s, Every 5   

    



      injection       14    :43                                 min PRN,       



      25 mcg                                                 other,       



                                                            pain,       



                                                            moderate       



                                                            (4-6);       



                                                            pain,       



                                                            severe       



                                                            (7-10),       



                                                            Starting       



                                                            Tue         



                                                            10/13/20       



                                                            at 1459,       



                                                            For 6       



                                                            hours,       



                                                            PACU        



                                                            (only)<br>       



                                                            Maximum       



                                                            dose 100       



                                                            mcg.<br>Ro       



                                                            utine       

 

      heparin       2020 No                5000U       5,000       



      (porcine)       0-12  10-12                               Units,       



      injection       22:00: 22:15                               Subcutaneo     

  



      5,000 Units       00    :00                                 us, Every     

  



                                                            8 hours       



                                                            scheduled,       



                                                            First dose       



                                                            on Mon       



                                                            10/12/20       



                                                            at 2200,       



                                                            For 1       



                                                            dose&lt;br       



                                                            >For        



                                                            questionab       



                                                            le          



                                                            bleeding,       



                                                            contact       



                                                            the         



                                                            provider       



                                                            for         



                                                            further       



                                                            instructio       



                                                            ns prior       



                                                            to          



                                                            administra       



                                                            tion.&nbsp       



                                                            ;HIGH       



                                                            ALERT       



                                                            MEDICATION       



                                                            <br>Routin       



                                                            e           

 

      FLUoxetine       2020       No                40mg        40 mg,       



      (PROzac)       0-12                                      Oral,       



      capsule 40       12:00:                                     Daily       



      mg          00                                        (standard)       



                                                            , First       



                                                            dose on       



                                                            Mon         



                                                            10/12/20       



                                                            at          



                                                            1200<br>Ro       



                                                            utine       

 

      heparin       2020-1 2020- No                5000U       5,000       



      (porcine)       0-10  10-11                               Units,       



      injection       14:00: 23:23                               Subcutaneo     

  



      5,000 Units       00    :00                                 us, Every     

  



                                                            8 hours       



                                                            scheduled,       



                                                            First dose       



                                                            on Sat       



                                                            10/10/20       



                                                            at 1400,       



                                                            For 5       



                                                            doses&lt;b       



                                                            r>For       



                                                            questionab       



                                                            le          



                                                            bleeding,       



                                                            contact       



                                                            the         



                                                            provider       



                                                            for         



                                                            further       



                                                            instructio       



                                                            ns prior       



                                                            to          



                                                            administra       



                                                            tion.&nbsp       



                                                            ;HIGH       



                                                            ALERT       



                                                            MEDICATION       



                                                            <br>Routin       



                                                            e           

 

      magnesium       2020 No                2g          2 g,        



      sulfate       0-10  10-10                               Intravenou       



      2gm/50mL       07:00: 08:52                               s, at 25       



      IVPB        00    :00                                 mL/hr,       



                                                            Once, Sat       



                                                            10/10/20       



                                                            at 0700,       



                                                            For 1       



                                                            dose<br>Ro       



                                                            utine       

 

      promethazin       2020       No                12.5mg       12.5 mg,    

   



      e           0-09                                      Intravenou       



      (PHENERGAN)       20:23:                                     s, Every 6   

    



      12.5 mg in       55                                        hours PRN,     

  



      sodium                                                 nausea,       



      chloride                                                 vomiting,       



      (NS) 0.9 %                                                 Starting       



      25 mL                                                 Fri         



      infusion                                                 10/9/20 at       



                                                            <br>Ro       



                                                            utine       

 

      dextrose 5       2020 No          Pancreatic 50mL/h       50 mL/hr

,       



      % and       0-09  10-14             mass              Intravenou       



      sodium       20:00: 13:37                               s,          



      chloride       00    :45                                 Continuous       



      0.45 % with                                                 , Starting    

   



      KCl 20                                                 Fri         



      mEq/L                                                 10/9/20 at       



      infusion                                                 2000<br>pl       



                                                            ease        



                                                            reorder 2       



                                                            hours       



                                                            prior to       



                                                            needing       



                                                            new         



                                                            dose<br>Ro       



                                                            utine       

 

      lactated       2020 No                10mL/h       10 mL/hr,      

 



      Ringers       0-09  10-14                               Intravenou       



      infusion       16:00: 11:47                               s,          



                  00    :37                                 Continuous       



                                                            , Starting       



                                                            Fri         



                                                            10/9/20 at       



                                                            1600,       



                                                            Pre-op       



                                                            (day of       



                                                            surgery)<b       



                                                            r>KVO<br>R       



                                                            outine       

 

      piperacilli       2020- No          intra-abdom 3.375g       3.375 

g,       



      n-tazobacta       0-09  10-12             inal              Intravenou    

   



      m (ZOSYN)       14:00: 21:27                               s, at 100      

 



      IVPB        00    :00                                 mL/hr,       



      (premix)                                                 Every 6       



      3.375 g                                                 hours,       



                                                            First dose       



                                                            on Fri       



                                                            10/9/20 at       



                                                            1400, For       



                                                            14          



                                                            doses&lt;b       



                                                            r>Routine,       



                                                            Indication       



                                                            s:          



                                                            intra-abdo       



                                                            evans       

 

      sodium       2020 No                30mmol       30 mmol,       



      phosphate       0-09  10-                               Intravenou      

 



      30 mmol in       07:00: 12:44                               s, at 47.5    

   



      dextrose 5       00    :00                                 mL/hr,       



      % 250 mL                                                 Once, Fri       



      IVPB                                                  10/9/20 at       



                                                            0700, For       



                                                            1           



                                                            dose<br>Ro       



                                                            utine       

 

      dextrose 5       2020- No                100mL/h       100 mL/hr,  

     



      % and       0-09  10-09                               Intravenou       



      sodium       00:00: 19:45                               s,          



      chloride       00    :18                                 Continuous       



      0.45 % with                                                 , Starting    

   



      KCl 20                                                 Fri         



      mEq/L                                                 10/9/20 at       



      infusion                                                 0000<br>pl       



                                                            ease        



                                                            reorder 2       



                                                            hours       



                                                            prior to       



                                                            needing       



                                                            new         



                                                            dose<br>Ro       



                                                            utine       

 

      potassium       2020 No                40meq       40 mEq,       



      chloride       0-08  10-08                               Oral,       



      (KLOR-CON)       17:00: 18:00                               Once, Thu     

  



      CR tablet       00    :00                                 10/8/20 at      

 



      40 mEq                                                 1700, For       



                                                            1           



                                                            dose<br>Ro       



                                                            utine       

 

      loratadine       2020       No                10mg        10 mg,       



      (CLARITIN)       0-08                                      Oral,       



      tablet 10       15:00:                                     Daily       



      mg          00                                        (standard)       



                                                            , First       



                                                            dose on       



                                                            Thu         



                                                            10/8/20 at       



                                                            1500<br>Ro       



                                                            utine       

 

      magnesium       2020 No                400mg       400 mg,       



      oxide       0-08  10-08                               Oral,       



      (MAG-OX)       07:00: 08:00                               Once, Thu       



      tablet 400       00    :00                                 10/8/20 at     

  



      mg                                                    0700, For       



                                                            1           



                                                            dose<br>Ro       



                                                            utine       

 

      heparin       2020 No                5000U       5,000       



      (porcine)       0-08  10-09                               Units,       



      injection       06:00: 06:10                               Subcutaneo     

  



      5,000 Units       00    :05                                 us, Every     

  



                                                            8 hours       



                                                            scheduled,       



                                                            First dose       



                                                            on Thu       



                                                            10/8/20 at       



                                                            0600<br>Fo       



                                                            r           



                                                            questionab       



                                                            le          



                                                            bleeding,       



                                                            contact       



                                                            the         



                                                            provider       



                                                            for         



                                                            further       



                                                            instructio       



                                                            ns prior       



                                                            to          



                                                            administra       



                                                            tion.&nbsp       



                                                            ;HIGH       



                                                            ALERT       



                                                            MEDICATION       



                                                            <br>Routin       



                                                            e           

 

      potassium       2020 No                10meq       10 mEq,       



      chloride 10       0-08  10-08                               Intravenou    

   



      mEq in 100       06:00: 10:00                               s,          



      mL IVPB       00    :00                                 Administer       



                                                            over 60       



                                                            Minutes,       



                                                            Every 1       



                                                            hour,       



                                                            First dose       



                                                            on Thu       



                                                            10/8/20 at       



                                                            0600, For       



                                                            4           



                                                            doses<br>T       



                                                            otal dose       



                                                            = 40 mEq;       



                                                            If          



                                                            Potassium       



                                                            >/= 3.5       



                                                            infuse at       



                                                            10          



                                                            mEq/hour&n       



                                                            bsp;If       



                                                            Potassium       



                                                            < 3.5 may       



                                                            infuse at       



                                                            20          



                                                            mEq/hour&n       



                                                            bsp;(Perip       



                                                            heral       



                                                            Line)&nbsp       



                                                            ;HIGH       



                                                            ALERT       



                                                            MEDICATION       



                                                            for         



                                                            patients <       



                                                            16 years       



                                                            of          



                                                            age<br>Rou       



                                                            chris        

 

      potassium       2020                40meq       40 mEq,       



      chloride       0-08  10-08                               Oral,       



      (KLOR-CON)       06:00: 05:32                               Once, Thu     

  



      CR tablet       00    :00                                 10/8/20 at      

 



      40 mEq                                                 0600, For       



                                                            1           



                                                            dose<br>Ro       



                                                            utine       

 

      ondansetron       2020                4mg         4 mg,       



      (ZOFRAN-ODT       0-08  10-14                               Oral,       



      )           04:37: 22:37                               Every 8       



      disintegrat       08    :25                                 hours PRN,    

   



      ing tablet                                                 nausea,       



      4 mg                                                  vomiting,       



                                                            Starting       



                                                            u         



                                                            10/8/20 at       



                                                            0437<br>Ro       



                                                            utine       

 

      acetaminoph       2020       No                500mg       500 mg,      

 



      en          0-08                                      Oral,       



      (TYLENOL)       04:36:                                     Every 8       



      tablet 500       15                                        hours PRN,     

  



      mg                                                    pain,mild       



                                                            (1-3),       



                                                            Starting       



                                                            u         



                                                            10/8/20 at       



                                                            0436<br>AD       



                                                            ULT MAX:       



                                                            NOT TO       



                                                            EXCEED 4GM       



                                                            ACETAMINOP       



                                                            HEN IN       



                                                            24HRS<br>R       



                                                            outine       

 

      oxyCODONE       2020 No                5mg         [Order 1       



      (ROXICODONE       0-08  10-22                               Start]       



      ) immediate       04:35: 04:34                               Name:       



      release       44    :44                                 oxyCODONE       



      tablet 5 mg                                                 (ROXICODON    

   



                                                            E)          



                                                            immediate       



                                                            release       



                                                            tablet 5       



                                                            mg Signed       



                                                            Summary: 5       



                                                            mg, Oral,       



                                                            Every 4       



                                                            hours PRN,       



                                                            pain,moder       



                                                            ate (4-6),       



                                                            Starting       



                                                            u         



                                                            10/8/20 at       



                                                            0435, For       



                                                            14          



                                                            days<br>Ro       



                                                            utine       



                                                            [Order 1       



                                                            End]        



                                                            [Order 2       



                                                            Start]       



                                                            Name:       



                                                            oxyCODONE       



                                                            (ROXICODON       



                                                            E)          



                                                            immediate       



                                                            release       



                                                            tablet 10       



                                                            mg Signed       



                                                            Summary:       



                                                            10 mg,       



                                                            Oral,       



                                                            Every 4       



                                                            hours PRN,       



                                                            pain,sever       



                                                            e (7-10),       



                                                            Starting       



                                                            u         



                                                            10/8/20 at       



                                                            0435, For       



                                                            14          



                                                            days&lt;br       



                                                            >May give       



                                                            if 5 mg is       



                                                            ineffectiv       



                                                            e.<br>Rout       



                                                            ine [Order       



                                                            2 End]       

 

      dextrose 5       2020 2020- No                100mL/h       100 mL/hr,  

     



      % and       0-08  10-08                               Intravenou       



      sodium       04:00: 17:00                               s,          



      chloride       00    :37                                 Continuous       



      0.45 % with                                                 , Starting    

   



      KCl 20                                                 Thu         



      mEq/L                                                 10/8/20 at       



      infusion                                                 0400<br>pl       



                                                            ease        



                                                            reorder 2       



                                                            hours       



                                                            prior to       



                                                            needing       



                                                            new         



                                                            dose<br>Ro       



                                                            utine       

 

      influenza       2020       No                .5mL        0.5 mL,       



      vaccine       0-08                                      Intramuscu       



      quad        02:54:                                     lar,        



      (FLUARIX,       27                                        During       



      FLULAVAL,                                                 hospitaliz      

 



      FLUZONE) (6                                                 ation,       



      MOS & UP)                                                 Starting       



      -                                                 Thu         



                                                            10/8/20 at       



                                                            0254, For       



                                                            1           



                                                            dose<br>SH       



                                                            BROOKS WELL;       



                                                            May be       



                                                            used in       



                                                            egg         



                                                            allergic       



                                                            patients.       



                                                            Latex       



                                                            free.       



                                                            Preservati       



                                                            ve          



                                                            free.<br>R       



                                                            outine       

 

      aspirin 325                   Yes               325mg       Take 325 Take 

325 



      MG tablet                                                 mg by mg by 



                                                            mouth mouth 



                                                            every four every 



                                                            (4) hours four (4) 



                                                            as needed. hours as 



                                                                  needed. 

 

      ibuprofen                   Yes               200mg       Take 200 Take 20

0 



      (ADVIL,MOTR                                                 mg by mg by 



      IN) 200 MG                                                 mouth mouth 



      tablet                                                 every six every six

 



                                                            (6) hours (6) hours 



                                                            as needed as needed 



                                                            for pain. for pain. 

 

      Aspirin                   Yes               1                       



      Adult                                                             

 

      FLUoxetine                   Yes               1                       



      HCl                                                               

 

      Acetaminoph                   Yes               1                       



      en                                                                

 

      Antacid                   Yes               2                       

 

      Ibuprofen                   Yes               1                       

 

      Ondansetron                   Yes               1                       



      HCl                                                               

 

      oxyCODONE                   Yes               1                       



      HCl                                                               

 

      Pantoprazol                   Yes               1                       



      e Sodium                                                             

 

      PriLOSEC                   Yes               1                       

 

      PROzac                   Yes               1                       

 

      Sucralfate                   Yes               1                       

 

      FLUoxetine             - No                40mg        Take 40 mg Take

 40 



      (PROZAC) 40             10-15                               by mouth mg by

 



      MG capsule             00:00                               daily. mouth 



                        :00                                       daily. 

 

      naproxen                                         Take by Take by 



      (NAPROSYN)             10-09                               mouth Two mouth

 Two 



      125 mg/5 mL             00:00                               (2) times (2) 

times 



      suspension             :00                                 a day. a day. 







Problems







        Condition Condition Condition Status  Onset   Resolution Last    Treatin

g Comments



        Name    Details Category         Date    Date    Treatment Clinician 



                                                        Date            

 

        Not on file Not on file [id number]                                        

 

 

        Carcinoma Carcinoma Diagnosis active                                  



        of head of of head of                                                 



        pancreas pancreas                                                 







Procedures







                Procedure       Date / Time Performed Performing Clinician Devic e

 

                COMPREHENSIVE METABOLIC PANEL 2020-10-15 03:58:00 Alvarado Huber    

  

 

                MAGNESIUM       2020-10-15 03:58:00 Huber, Alvarado      

 

                PHOSPHORUS      2020-10-15 03:58:00 Huber, Alvarado      

 

                CBC             2020-10-15 03:58:00 Huber, Alvarado      

 

                COMPREHENSIVE METABOLIC PANEL 2020-10-14 03:53:00 Huber, Alvarado    

  

 

                MAGNESIUM       2020-10-14 03:53:00 Huber, Alvarado      

 

                PHOSPHORUS      2020-10-14 03:53:00 Huber, Alvarado      

 

                CBC             2020-10-14 03:53:00 Huber, Alvarado      

 

                UPPER ENDOSCOPIC ULTRASOUND 2020-10-13 13:58:33 Favian Meyer

[facility] GI IMAGING (NO 2020-10-13 13:28:03 Karo Solis 



                INTERPRETATION)                                 

 

                COMPREHENSIVE METABOLIC PANEL 2020-10-13 04:19:00 Huber, Alvarado    

  

 

                MAGNESIUM       2020-10-13 04:19:00 Huber, Alvarado      

 

                PHOSPHORUS      2020-10-13 04:19:00 Huber, Alvarado      

 

                CBC             2020-10-13 04:19:00 Huber, Alvarado      

 

                PERIPHERAL INTRAVENOUS DEVICE BY 2020-10-12 08:42:24 José Miguel Locke                                             

 

                COMPREHENSIVE METABOLIC PANEL 2020-10-12 03:54:00 Huber, Alvarado    

  

 

                MAGNESIUM       2020-10-12 03:54:00 Huber, Alvarado      

 

                PHOSPHORUS      2020-10-12 03:54:00 Huber, Alvarado      

 

                CBC             2020-10-12 03:54:00 Huber, Alvarado      

 

                COMPREHENSIVE METABOLIC PANEL 2020-10-11 04:00:00 Huber, Alvarado    

  

 

                MAGNESIUM       2020-10-11 04:00:00 Huber, Alvarado      

 

                PHOSPHORUS      2020-10-11 04:00:00 Huber, Alvarado      

 

                CBC             2020-10-11 04:00:00 Huber, Alvarado      

 

                COMPREHENSIVE METABOLIC PANEL 2020-10-10 03:55:00 Huber, Alvarado    

  

 

                MAGNESIUM       2020-10-10 03:55:00 Huber, Alvarado      

 

                PHOSPHORUS      2020-10-10 03:55:00 Huber, Alvarado      

 

                CBC             2020-10-10 03:55:00 Huber, Alvarado      

 

                SURGICAL PATHOLOGY EXAM 2020-10-09 18:45:00 Monica Lakhani 

 

                UGI ENDO, INCLUDE ESOPHAGUS, 2020-10-09 18:16:00 Monica Lakhani

t 



                STOMACH, & DUODENUM &/OR JEJUNUM;                               

  



                DX W/WO COLLECTION SPECIMN, BY                                 



                BRUSH OR WASH                                   

 

                UGI W/ TRANSENDOSCOPIC ULTRASOUND 2020-10-09 18:16:00 Monica Lakhani 



                GUIDED INTRAMURAL/TRANSMURAL FINE                               

  



                NEEDLE ASPIRATION/BIOPSY(S),                                 



                ESOPHAGUS                                       

 

                UPPER ENDOSCOPIC ULTRASOUND 2020-10-09 17:02:29 Favian Meyer

ul 

 

                ERCP            2020-10-09 15:07:17 Favian Meyer 

 

                [facility] GI IMAGING (NO 2020-10-09 15:03:03 Monica Lakhani 



                INTERPRETATION)                                 

 

                COMPREHENSIVE METABOLIC PANEL 2020-10-09 03:59:00 Huber, Alvarado    

  

 

                MAGNESIUM       2020-10-09 03:59:00 Huber, Alvarado      

 

                PHOSPHORUS      2020-10-09 03:59:00 Huber, Alvarado      

 

                CBC             2020-10-09 03:59:00 Huber, Alvarado      

 

                COVID-19 PCR    2020-10-08 18:29:00 MidtlingGuy 

 

                BASIC METABOLIC PANEL 2020-10-08 14:27:00 MidtlingGuy 

 

                US OUTSIDE FILM FOR CONTINUED CARE 2020-10-08 03:58:21 DIANE Locke 

 

                CT BODY OUTSIDE FILM FOR CONTINUED 2020-10-08 03:58:10 DIANE Locke 



                CARE                                            

 

                COMPREHENSIVE METABOLIC PANEL 2020-10-08 03:40:00 Huber, Alvarado    

  

 

                CEA             2020-10-08 03:40:00 Yg, Inez    

 

                MAGNESIUM       2020-10-08 03:40:00 Huber, Alvarado      

 

                PHOSPHORUS      2020-10-08 03:40:00 Huber, Alvarado      

 

                CBC             2020-10-08 03:40:00 Huber, Alvarado      

 

                CANCER ANTIGEN 19-9 2020-10-08 03:40:00 YgInez whitman    

 

                Hepatoesophagostromy                                 

 

                EUS bx                                          







Results







           Test Description Test Time  Test Comments Text Results Atomic Results

 Result 

Comments









                WBC             2020 16:52:00                 









                          Test Item    Value        Reference Range Comments









                WBC (test code = WBC) 26.3000         4.0000-10.0000  

 

                Lymphocytes % (test code = Lymphocytes %) 4.3000 %        22.400

0-43.6000 

 

                MID% (test code = MID%) 7.3000 %        1.2000-11.2000  

 

                Neutrophils % (test code = Neutrophils %) 88.4000 %       48.900

0-69.9000 

 

                Lymphocytes (test code = Lymphocytes) 1.1000          1.2000-3.2

000   

 

                MID (test code = MID) 1.9000          0.1000-1.1000   

 

                Neutrophils (test code = Neutrophils) 23.3000         1.5000-6.7

000   

 

                RBC (test code = RBC) 3.3300          3.7000-4.9000   

 

                HGB (test code = HGB) 9.7000 g/dL     11.2000-18.0000 

 

                HCT (test code = HCT) 28.0000 %       34.0000-44.0000 

 

                MCV (test code = MCV) 84.1000 fL      80.0000-94.0000 

 

                MCH (test code = MCH) 29.2000 pg      27.0000-34.0000 

 

                MCHC (test code = MCHC) 34.7000 g/dL    31.5000-36.0000 

 

                RDW (test code = RDW) 15.6000         11.0000-18.0000 

 

                PLT (test code = PLT) 793.0000        140.0000-440.0000 

 

                MPV (test code = MPV) 8.8000 fL       6.8000-10.6000  



Comprehensive Metabolic Panel (10/15/2020  3:58 AM EDT)2020-10-15 03:58:00





                Test Item       Value           Reference Range Comments

 

                Sodium (test code = Sodium) 138 mmol/L      135 - 145 mmol/L 

 

                Potassium (test code = Potassium) 4.2 mmol/L      3.5 - 5.0 mmol

/L 

 

                Chloride (test code = Chloride) 103 mmol/L      98 - 107 mmol/L 

 

                Anion Gap (test code = Anion Gap) 9 mmol/L        7 - 15 mmol/L 

  

 

                CO2 (test code = CO2) 26.0 mmol/L     22.0 - 30.0 mmol/L 

 

                BUN (test code = BUN) 8 mg/dL         7 - 21 mg/dL    

 

                Creatinine (test code = Creatinine) 0.66 mg/dL      0.70 - 1.30 

mg/dL 

 

                BUN/Creatinine Ratio (test code = 12                            

  



                BUN/Creatinine Ratio)                                 

 

                EGFR CKD-EPI Non-, Male (test >90             >=

60 mL/min/1.73m2 



                code = EGFR CKD-EPI Non-,                       

          



                Male)                                           

 

                EGFR CKD-EPI , Male (test >90             >=60 m

L/min/1.73m2 



                code = EGFR CKD-EPI , Male)                     

            

 

                Glucose (test code = Glucose) 107 mg/dL       70 - 179 mg/dL  

 

                Calcium (test code = Calcium) 8.5 mg/dL       8.5 - 10.2 mg/dL 

 

                Albumin (test code = Albumin) 2.9 g/dL        3.5 - 5.0 g/dL  

 

                Total Protein (test code = Total Protein) 5.9 g/dL        6.5 - 

8.3 g/dL  

 

                Total Bilirubin (test code = Total Bilirubin) 6.7 mg/dL       0.

0 - 1.2 mg/dL 

 

                AST (test code = AST) 51 U/L          19 - 55 U/L     

 

                ALT (test code = ALT) 94 U/L          <50 U/L         

 

                Alkaline Phosphatase (test code = Alkaline 631 U/L         38 - 

126 U/L    



                Phosphatase)                                    



CBC (10/15/2020  3:58 AM EDT)2020-10-15 03:58:00





                Test Item       Value           Reference Range Comments

 

                WBC (test code = WBC) 21.1 10*9/L     4.5 - 11.0 10*9/L 

 

                RBC (test code = RBC) 3.86 10*12/L    4.50 - 5.90 10*12/L 

 

                HGB (test code = HGB) 11.0 g/dL       13.5 - 17.5 g/dL 

 

                HCT (test code = HCT) 34.6 %          41.0 - 53.0 %   

 

                MCV (test code = MCV) 89.7 fL         80.0 - 100.0 fL 

 

                MCH (test code = MCH) 28.4 pg         26.0 - 34.0 pg  

 

                MCHC (test code = MCHC) 31.7 g/dL       31.0 - 37.0 g/dL 

 

                RDW (test code = RDW) 17.2 %          12.0 - 15.0 %   

 

                MPV (test code = MPV) 11.4 fL         7.0 - 10.0 fL   

 

                Platelet (test code = Platelet) 344 10*9/L      150 - 440 10*9/L

 



Magnesium Level (10/15/2020  3:58 AM EDT)2020-10-15 03:58:00





                Test Item       Value           Reference Range Comments

 

                Magnesium (test code = Magnesium) 1.9 mg/dL       1.6 - 2.2 mg/d

L 



Phosphorus Level (10/15/2020  3:58 AM EDT)2020-10-15 03:58:00





                Test Item       Value           Reference Range Comments

 

                Phosphorus (test code = Phosphorus) 3.1 mg/dL       2.9 - 4.7 mg

/dL 



Phosphorus Level (10/14/2020  3:53 AM EDT)2020-10-14 03:53:00





                Test Item       Value           Reference Range Comments

 

                Phosphorus (test code = Phosphorus) 2.6 mg/dL       2.9 - 4.7 mg

/dL 



Comprehensive Metabolic Panel (10/14/2020  3:53 AM EDT)2020-10-14 03:53:00





                Test Item       Value           Reference Range Comments

 

                Sodium (test code = Sodium) 136 mmol/L      135 - 145 mmol/L 

 

                Potassium (test code = Potassium) 4.3 mmol/L      3.5 - 5.0 mmol

/L 

 

                Chloride (test code = Chloride) 100 mmol/L      98 - 107 mmol/L 

 

                Anion Gap (test code = Anion Gap) 12 mmol/L       7 - 15 mmol/L 

  

 

                CO2 (test code = CO2) 24.0 mmol/L     22.0 - 30.0 mmol/L 

 

                BUN (test code = BUN) 6 mg/dL         7 - 21 mg/dL    

 

                Creatinine (test code = Creatinine) 0.62 mg/dL      0.70 - 1.30 

mg/dL 

 

                BUN/Creatinine Ratio (test code = 10                            

  



                BUN/Creatinine Ratio)                                 

 

                EGFR CKD-EPI Non-, Male (test >90             >=

60 mL/min/1.73m2 



                code = EGFR CKD-EPI Non-,                       

          



                Male)                                           

 

                EGFR CKD-EPI , Male (test >90             >=60 m

L/min/1.73m2 



                code = EGFR CKD-EPI , Male)                     

            

 

                Glucose (test code = Glucose) 137 mg/dL       70 - 179 mg/dL  

 

                Calcium (test code = Calcium) 8.6 mg/dL       8.5 - 10.2 mg/dL 

 

                Albumin (test code = Albumin) 3.0 g/dL        3.5 - 5.0 g/dL  

 

                Total Protein (test code = Total Protein) 6.0 g/dL        6.5 - 

8.3 g/dL  

 

                Total Bilirubin (test code = Total Bilirubin) 9.8 mg/dL       0.

0 - 1.2 mg/dL 

 

                AST (test code = AST) 95 U/L          19 - 55 U/L     

 

                ALT (test code = ALT) 100 U/L         <50 U/L         

 

                Alkaline Phosphatase (test code = Alkaline 715 U/L         38 - 

126 U/L    



                Phosphatase)                                    



CBC (10/14/2020  3:53 AM EDT)2020-10-14 03:53:00





                Test Item       Value           Reference Range Comments

 

                WBC (test code = WBC) 18.1 10*9/L     4.5 - 11.0 10*9/L 

 

                RBC (test code = RBC) 4.16 10*12/L    4.50 - 5.90 10*12/L 

 

                HGB (test code = HGB) 11.5 g/dL       13.5 - 17.5 g/dL 

 

                HCT (test code = HCT) 37.0 %          41.0 - 53.0 %   

 

                MCV (test code = MCV) 88.8 fL         80.0 - 100.0 fL 

 

                MCH (test code = MCH) 27.7 pg         26.0 - 34.0 pg  

 

                MCHC (test code = MCHC) 31.2 g/dL       31.0 - 37.0 g/dL 

 

                RDW (test code = RDW) 17.2 %          12.0 - 15.0 %   

 

                MPV (test code = MPV) 10.4 fL         7.0 - 10.0 fL   

 

                Platelet (test code = Platelet) 345 10*9/L      150 - 440 10*9/L

 



Magnesium Level (10/14/2020  3:53 AM EDT)2020-10-14 03:53:00





                Test Item       Value           Reference Range Comments

 

                Magnesium (test code = Magnesium) 2.0 mg/dL       1.6 - 2.2 mg/d

L 



[place] [facility] GI Imaging (No Interpretation) (10/13/2020  1:28 PM EDT)2020-10-13 
15:45:42FL [facility] GI Imaging (No Interpretation) (10/13/2020 1:28 PM 
EDT)SpecimenNarrativePerformed AtThis order was placed for internal RIS 
purposes. This order does not require a diagnostic report. [facility] RADProcedure
NoteInterface, Rad Results In - 10/13/2020 3:45 PM EDTThis order was placed for 
internal RIS purposes. This order does not require a diagnostic report. 
dmkPerforming OrganizationAddressCity/State/ZIP CodePhone NumberKPC Promise of [facility] 
[id number] [facility].[place], [place] [address]Oubcs Endoscopic Ultrasound (EUS) 
(10/13/2020  1:58 PM EDT)2020-10-13 13:58:33Upper Endoscopic Ultrasound (EUS) 
(10/13/2020 1:58 PM EDT)SpecimenNarrativePerformed At______________
_________________________________________________________________Patient Name: 
Leon Hagan       Procedure Date: 10/13/2020 1:58 PMMRN: 
[id number]           YOB: 1970Admit Type: 
Inpatient         Age: 50[address]    
 Gender: MaleNote Status: Finalized        Instrument Name: 
[id number]__
_____________________________________________________________________________ 
Procedure:      Upper EUS guided biliary interventionIndications: 
    Common bile duct dilation (acquired) seen on CT scan,     
      Suspected mass in pancreas on CT scan; failed ERCPProviders: 
      DEONTE ROBB MD, KARO SOLIS MD,     
       TRISTAN BLACK MD (Fellow), Erna Rueda,  
        Bridgette Doyle MD:     
FAVIAN MEYER DO (Referring MD)Medicines:       General 
AnesthesiaComplications:     No immediate complications. Estimated blood
loss:            
Minimal._________________________________________________
______________________________Procedure:       Pre-Anesthesia 
Assessment:           - Prior to the procedure, a History 
and Physical was           performed, and patient 
medications and allergies were            reviewed. The 
patient's tolerance of previous            anesthesia was 
also reviewed. The risks and benefits            of the 
procedure and the sedation options and risks            
were discussed with the patient. All questions were         
 answered, and informed consent was obtained. Prior         
  Anticoagulants: The patient has taken no previous         
   anticoagulantor antiplatelet agents. ASA Grade         
   Assessment: III - A patient with severe systemic        
    disease. After reviewing the risks and benefits, the     
      patient was deemed in satisfactory condition to     
      undergo the procedure.            After 
obtaining informed consent, the endoscope was            
passed under direct vision. Throughout the procedure,         
  the patient's blood pressure, pulse, and oxygen         
  saturations were monitored continuously. The          
 Duodenoscope was introduced through the mouth, and         
  advanced to the second part of duodenum. The upper EUS       
     was accomplished without difficulty. The patient      
      tolerated the procedure well.           
                          
 Findings:   ENDOSONOGRAPHIC FINDING: :   There was dilation diffusely
throughout the intrahepatic bile duct(s).   The decision was made to create 
a hepaticoenterostomy. Once an   appropriate position was identified, the 
common wall between the distal   esophagus and a dilated left intrahepatic 
duct was interrogated   utilizing color Doppler imaging to identify 
interposed vessels. The  esophageal wall and the duct were punctured under 
endosonographic   guidance with a 19 gauge needle. The biliary tree was 
injected with full   strength ionic contrast and a cholangiogram was 
obtained under   fluoroscopy. A 0.025 inch x 450 cm angled Visiglide wire 
was inserted   into the left intrahepatic duct under fluoroscopic guidance 
and allowed   to coil twice. The opening was dilated with a 6 mm balloon 
dilator. An 8   cm long X 8 mm stent (Viabil fully covered metal biliary) 
was placed   into the left intrahepatic duct through the 
hepaticoesophagostomy. The   stent was successfully placed. Through the 
Viabil a 7Fr X 10 cm double   pigtail stent was placed.   A single round
lesion was identified endosonographically in the left   lobe of the liver. 
The endosonographic appearance was suggestive of   metastasis. The lesion 
was hyperechoic.                     
                  Impression:     
- There was dilation in the intrahepatic bile ducts,        
  diffusely.            - A single metastatic lesion 
was found in the left            lobe of the liver.   
         - Successful EUS-guided hepaticoesophagostomy.   
         - No specimens collected.Recommendation:    -
Return patient to [facility] for [facility] care.           
 - Resume previous diet today.            - Continue 
present medications.            - Observe patient's 
clinical course.            - Return to referring 
physician as previously            scheduled.     
                          
        Procedure Code(s):   --- Professional ---    
        94596, Anastomosis, of extrahepatic biliary ducts and 
          gastrointestinal tract          
  11100, Esophagogastroduodenoscopy, flexible,          
 transoral; with endoscopic ultrasound examination          
 limited to the esophagus, stomach or duodenum, and         
   adjacent structures            44678, 
Cholangiography and/or pancreatography;           
intraoperative, radiological supervision and            
interpretationDiagnosis Code(s):   --- Professional ---       
    K83.8, Otherspecified diseases of biliary tract       
    C78.7, Secondary malignant neoplasm of liver and       
     intrahepatic bile duct            R93.3, 
Abnormal findings on diagnostic imaging of            
other parts of digestive tract CPT copyright 2019 [employer]. 
All rights reserved. The codes documented in this report are preliminary and 
upon  review may be revised to meet current compliance 
requirements.Attending Participation:   I was present and participated 
during the entire procedure, including   non-key portions.      
                          
      Electronically Signed By Deonte Robb MD_______
_______________DEONTE ROBB MD10/ 2:57:44 PMThe attending 
physician was present throughout the entire procedural suite including 
insertion, viewing, and removal. Electronically Signed by Karo Solis MD________________________KARO SOLIS MD10/ 2:56:48 PM 
___________________________PARKER ALEXANDREumber of Addenda: 0 Note 
Initiated On: 10/13/2020 1:58PM[facility] [place] RADProcedure NoteInterface, Rad Results In - 
10/13/2020 3:01 PM EDT____________________________
___________________________________________________ Patient Name: Leon Hagan 
Procedure Date: 10/13/2020 1:58 PM MRN: [id number] YOB: 1970 
Admit Type: Inpatient Age: 50 Room: [address] Gender: Male Note 
Status: Finalized Instrument Name: [id number] __________________
_____________________________________________________________ Procedure: Upper 
EUS guided biliary intervention Indications: Common bile duct dilation 
(acquired) seen on CT scan, Suspected mass in pancreas on CT scan; failed ERCP 
Providers: DEONTE ROBB MD, KARO SOLIS MD, TRISTAN BLACK MD (Fellow), Bridgette Gómez Referring MD: FAVIAN MEYER DO (Referring MD) Medicines: General Anesthesia Complications: No 
immediate complications. Estimated blood loss: Minimal. 
_______________________________________________________________________________ 
Procedure: Pre-Anesthesia Assessment: - Prior to the procedure, a History and 
Physical was performed, and patient medications and allergies were reviewed. The
patient's tolerance of previous anesthesia wasalso reviewed. The risks and 
benefits of the procedure and the sedation options and risks were discussed with
the patient. All questions were answered, and informed consent was obtained. 
Prior Anticoagulants: The patient has taken no previous anticoagulant or 
antiplatelet agents. ASA Grade Assessment:III - A patient with severe systemic 
disease. After reviewing the risks and benefits, the patient was deemed in 
satisfactory condition to undergo the procedure. After obtaining informed 
consent, the endoscope was passed under direct vision. Throughout the procedure,
the patient's blood pressure, pulse, and oxygen saturations were monitored 
continuously. The Duodenoscope was introduced through the mouth, and advanced to
the second part of duodenum. The upper EUS was accomplished without difficulty. 
The patient tolerated the procedure well. Findings: ENDOSONOGRAPHIC FINDING: : 
There was dilation diffusely throughout the intrahepatic bile duct(s). The 
decision was made to create a hepaticoenterostomy. Once an appropriate position 
was identified, the common wall between the distal esophagus and a dilated left 
intrahepatic duct was interrogated utilizing color Doppler imaging to identify 
interposed vessels. The esophageal wall and the duct were punctured under 
endosonographic guidance with a 19 gauge needle. The biliary tree was injected 
with full strength ionic contrast and a cholangiogram was obtained under 
fluoroscopy. A 0.025 inch x 450 cm angled Visiglide wire was inserted into the 
left intrahepatic duct under fluoroscopic guidance and allowed to coil twice. 
The opening was dilated with a 6 mm balloon dilator. An 8 cm long X 8 mm stent 
(Viabil fully covered metal biliary) was placed into the left intrahepatic duct 
through the hepaticoesophagostomy. The stent was successfully placed. Through 
the Viabil a 7Fr X 10 cm double pigtail stent was placed. A single round lesion 
was identified endosonographically in the left lobe of the liver. The 
endosonographic appearance was suggestive of metastasis. The lesion was 
hyperechoic. Impression: - There was dilation in the intrahepatic bile ducts, di
ffusely. - A single metastatic lesion was found in the left lobe of the liver. -
Successful EUS-guided hepaticoesophagostomy. - No specimens collected. 
Recommendation: - Return patient to hospital wardfor ongoing care. - Resume 
previous diet today. - Continue present medications. - Observe patient's c
linical course. - Return to referring physician as previously scheduled. 
Procedure Code(s): --- Professional --- 60996, Anastomosis, of extrahepatic 
biliary ducts and gastrointestinal tract 44006, Esophagogastroduodenoscopy, 
flexible, transoral; with endoscopic ultrasound examination limited to the eso
phagus, stomach or duodenum, and adjacent structures 66216, Cholangiography 
and/or pancreatography; intraoperative, radiological supervision and 
interpretation Diagnosis Code(s): --- Professional --- K83.8, Other specified 
diseases of biliary tract C78.7, Secondary malignant neoplasm of liver and intra
hepatic bile duct R93.3, Abnormal findings on diagnostic imaging of other parts 
of digestive tract CPT copyright 2019 [employer] Association. All rights 
reserved. The codes documented in this report are preliminary and upon  
review may be revised to meet current compliance requirements. Attending 
Participation: I was present and participated during the entire procedure, 
including non-key portions. Electronically Signed By Deonte Robb MD 
______________________ DEONTE ROBB MD 10/13/2020 2:57:44 PM The 
attending physician was present throughout the entire procedural suite including
insertion, viewing, and removal. Electronically Signed by Karo Solis MD 
________________________ KARO SOLIS MD 10/13/2020 2:56:48 PM 
___________________________ TRISTAN BLACK MD Number of Addenda: 0 
Note Initiated On: 10/13/2020 1:58 PMPerforming OrganizationAddressCity/Sta
te/ZIP CodePhone Number[facility] [place] RADEM [id number] [facility] [place] [address]
Comprehensive Metabolic Panel (10/13/2020  4:19 AM EDT)2020-10-13 04:19:00





                Test Item       Value           Reference Range Comments

 

                Sodium (test code = Sodium) 139 mmol/L      135 - 145 mmol/L 

 

                Potassium (test code = Potassium) 4.5 mmol/L      3.5 - 5.0 mmol

/L 

 

                Chloride (test code = Chloride) 102 mmol/L      98 - 107 mmol/L 

 

                Anion Gap (test code = Anion Gap) 15 mmol/L       7 - 15 mmol/L 

  

 

                CO2 (test code = CO2) 22.0 mmol/L     22.0 - 30.0 mmol/L 

 

                BUN (test code = BUN) 7 mg/dL         7 - 21 mg/dL    

 

                Creatinine (test code = Creatinine) 0.61 mg/dL      0.70 - 1.30 

mg/dL 

 

                BUN/Creatinine Ratio (test code = 11                            

  



                BUN/Creatinine Ratio)                                 

 

                EGFR CKD-EPI Non-, Male (test >90             >=

60 mL/min/1.73m2 



                code = EGFR CKD-EPI Non-,                       

          



                Male)                                           

 

                EGFR CKD-EPI , Male (test >90             >=60 m

L/min/1.73m2 



                code = EGFR CKD-EPI , Male)                     

            

 

                Glucose (test code = Glucose) 118 mg/dL       70 - 179 mg/dL  

 

                Calcium (test code = Calcium) 9.1 mg/dL       8.5 - 10.2 mg/dL 

 

                Albumin (test code = Albumin) 3.4 g/dL        3.5 - 5.0 g/dL  

 

                Total Protein (test code = Total Protein) 6.7 g/dL        6.5 - 

8.3 g/dL  

 

                Total Bilirubin (test code = Total Bilirubin) 13.8 mg/dL      0.

0 - 1.2 mg/dL 

 

                AST (test code = AST) 65 U/L          19 - 55 U/L     

 

                ALT (test code = ALT) 95 U/L          <50 U/L         

 

                Alkaline Phosphatase (test code = Alkaline 707 U/L         38 - 

126 U/L    



                Phosphatase)                                    



CBC (10/13/2020  4:19 AM EDT)2020-10-13 04:19:00





                Test Item       Value           Reference Range Comments

 

                WBC (test code = WBC) 20.1 10*9/L     4.5 - 11.0 10*9/L 

 

                RBC (test code = RBC) 4.46 10*12/L    4.50 - 5.90 10*12/L 

 

                HGB (test code = HGB) 12.4 g/dL       13.5 - 17.5 g/dL 

 

                HCT (test code = HCT) 39.5 %          41.0 - 53.0 %   

 

                MCV (test code = MCV) 88.5 fL         80.0 - 100.0 fL 

 

                MCH (test code = MCH) 27.9 pg         26.0 - 34.0 pg  

 

                MCHC (test code = MCHC) 31.5 g/dL       31.0 - 37.0 g/dL 

 

                RDW (test code = RDW) 16.8 %          12.0 - 15.0 %   

 

                MPV (test code = MPV) 10.6 fL         7.0 - 10.0 fL   

 

                Platelet (test code = Platelet) 385 10*9/L      150 - 440 10*9/L

 



Magnesium Level (10/13/2020  4:19 AM EDT)2020-10-13 04:19:00





                Test Item       Value           Reference Range Comments

 

                Magnesium (test code = Magnesium) 2.2 mg/dL       1.6 - 2.2 mg/d

L 



Phosphorus Level (10/13/2020  4:19 AM EDT)2020-10-13 04:19:00





                Test Item       Value           Reference Range Comments

 

                Phosphorus (test code = Phosphorus) 3.2 mg/dL       2.9 - 4.7 mg

/dL 



FL [facility] GI Imaging (No Interpretation) (10/09/2020  3:03 PM EDT)2020-10-12 
08:48:44FL [facility] GI Imaging (No Interpretation) (10/09/2020 3:03 PM 
EDT)SpecimenNarrativePerformed AtThis order was placed for internal RIS 
purposes. This order does not require a diagnostic report. [facility] RADProcedure
NoteInterface, Rad Results In - 10/12/2020 8:48 AM EDTThis order was placed for 
internal RIS purposes. This order does not require a diagnostic report. 
dmkPerforming OrganizationAddressCity/State/ZIP CodePhone Number[facility] [place] RADEMC 
[id number] [facility].[place], [place] [address]Rnupwmzppirgb Metabolic Panel (10/12/2020  
3:54 AM EDT)2020-10-12 03:54:00





                Test Item       Value           Reference Range Comments

 

                Sodium (test code = Sodium) 135 mmol/L      135 - 145 mmol/L 

 

                Potassium (test code = Potassium) 4.0 mmol/L      3.5 - 5.0 mmol

/L 

 

                Chloride (test code = Chloride) 99 mmol/L       98 - 107 mmol/L 

 

                Anion Gap (test code = Anion Gap) 10 mmol/L       7 - 15 mmol/L 

  

 

                CO2 (test code = CO2) 26.0 mmol/L     22.0 - 30.0 mmol/L 

 

                BUN (test code = BUN) 4 mg/dL         7 - 21 mg/dL    

 

                Creatinine (test code = Creatinine) 0.63 mg/dL      0.70 - 1.30 

mg/dL 

 

                BUN/Creatinine Ratio (test code = 6                             

  



                BUN/Creatinine Ratio)                                 

 

                EGFR CKD-EPI Non-, Male (test >90             >=

60 mL/min/1.73m2 



                code = EGFR CKD-EPI Non-,                       

          



                Male)                                           

 

                EGFR CKD-EPI , Male (test >90             >=60 m

L/min/1.73m2 



                code = EGFR CKD-EPI , Male)                     

            

 

                Glucose (test code = Glucose) 110 mg/dL       70 - 179 mg/dL  

 

                Calcium (test code = Calcium) 8.7 mg/dL       8.5 - 10.2 mg/dL 

 

                Albumin (test code = Albumin) 3.0 g/dL        3.5 - 5.0 g/dL  

 

                Total Protein (test code = Total Protein) 6.2 g/dL        6.5 - 

8.3 g/dL  

 

                Total Bilirubin (test code = Total Bilirubin) 13.5 mg/dL      0.

0 - 1.2 mg/dL 

 

                AST (test code = AST) 62 U/L          19 - 55 U/L     

 

                ALT (test code = ALT) 98 U/L          <50 U/L         

 

                Alkaline Phosphatase (test code = Alkaline 680 U/L         38 - 

126 U/L    



                Phosphatase)                                    



CBC (10/12/2020  3:54 AM EDT)2020-10-12 03:54:00





                Test Item       Value           Reference Range Comments

 

                WBC (test code = WBC) 18.0 10*9/L     4.5 - 11.0 10*9/L 

 

                RBC (test code = RBC) 4.28 10*12/L    4.50 - 5.90 10*12/L 

 

                HGB (test code = HGB) 12.1 g/dL       13.5 - 17.5 g/dL 

 

                HCT (test code = HCT) 37.8 %          41.0 - 53.0 %   

 

                MCV (test code = MCV) 88.3 fL         80.0 - 100.0 fL 

 

                MCH (test code = MCH) 28.4 pg         26.0 - 34.0 pg  

 

                MCHC (test code = MCHC) 32.1 g/dL       31.0 - 37.0 g/dL 

 

                RDW (test code = RDW) 16.5 %          12.0 - 15.0 %   

 

                MPV (test code = MPV) 10.8 fL         7.0 - 10.0 fL   

 

                Platelet (test code = Platelet) 367 10*9/L      150 - 440 10*9/L

 



Magnesium Level (10/12/2020  3:54 AM EDT)2020-10-12 03:54:00





                Test Item       Value           Reference Range Comments

 

                Magnesium (test code = Magnesium) 2.0 mg/dL       1.6 - 2.2 mg/d

L 



Phosphorus Level (10/12/2020  3:54 AM EDT)2020-10-12 03:54:00





                Test Item       Value           Reference Range Comments

 

                Phosphorus (test code = Phosphorus) 3.1 mg/dL       2.9 - 4.7 mg

/dL 



Peripheral Intravenous Device by Vascular Access Team (10/12/2020  8:42 AM EDT)
2020-10-12 00:00:00Peripheral Intravenous Device by Vascular Access Team 
(10/12/2020 8:42 AM EDT)NarrativePerformed Efrain Arias RN   
10/12/2020 8:43 AMArrived in the pt. Room and primary care nurse had placed 
piv.no neew orders noted at this time.UNCHCSVATPerforming 
OrganizationAddressCity/State/ZIP CodePhone NumberUNCHCSVATComprehensive 
Metabolic Panel (10/11/2020  4:00 AM EDT)2020-10-11 04:00:00





                Test Item       Value           Reference Range Comments

 

                Sodium (test code = Sodium) 137 mmol/L      135 - 145 mmol/L 

 

                Potassium (test code = Potassium) 3.9 mmol/L      3.5 - 5.0 mmol

/L 

 

                Chloride (test code = Chloride) 101 mmol/L      98 - 107 mmol/L 

 

                Anion Gap (test code = Anion Gap) 8 mmol/L        7 - 15 mmol/L 

  

 

                CO2 (test code = CO2) 28.0 mmol/L     22.0 - 30.0 mmol/L 

 

                BUN (test code = BUN) 3 mg/dL         7 - 21 mg/dL    

 

                Creatinine (test code = Creatinine) 0.69 mg/dL      0.70 - 1.30 

mg/dL 

 

                BUN/Creatinine Ratio (test code = 4                             

  



                BUN/Creatinine Ratio)                                 

 

                EGFR CKD-EPI Non-, Male (test >90             >=

60 mL/min/1.73m2 



                code = EGFR CKD-EPI Non-,                       

          



                Male)                                           

 

                EGFR CKD-EPI , Male (test >90             >=60 m

L/min/1.73m2 



                code = EGFR CKD-EPI , Male)                     

            

 

                Glucose (test code = Glucose) 105 mg/dL       70 - 179 mg/dL  

 

                Calcium (test code = Calcium) 8.4 mg/dL       8.5 - 10.2 mg/dL 

 

                Albumin (test code = Albumin) 2.8 g/dL        3.5 - 5.0 g/dL  

 

                Total Protein (test code = Total Protein) 5.9 g/dL        6.5 - 

8.3 g/dL  

 

                Total Bilirubin (test code = Total Bilirubin) 13.0 mg/dL      0.

0 - 1.2 mg/dL 

 

                AST (test code = AST) 64 U/L          19 - 55 U/L     

 

                ALT (test code = ALT) 104 U/L         <50 U/L         

 

                Alkaline Phosphatase (test code = Alkaline 596 U/L         38 - 

126 U/L    



                Phosphatase)                                    



CBC (10/11/2020  4:00 AM EDT)2020-10-11 04:00:00





                Test Item       Value           Reference Range Comments

 

                WBC (test code = WBC) 16.1 10*9/L     4.5 - 11.0 10*9/L 

 

                RBC (test code = RBC) 4.02 10*12/L    4.50 - 5.90 10*12/L 

 

                HGB (test code = HGB) 11.4 g/dL       13.5 - 17.5 g/dL 

 

                HCT (test code = HCT) 35.8 %          41.0 - 53.0 %   

 

                MCV (test code = MCV) 89.0 fL         80.0 - 100.0 fL 

 

                MCH (test code = MCH) 28.2 pg         26.0 - 34.0 pg  

 

                MCHC (test code = MCHC) 31.7 g/dL       31.0 - 37.0 g/dL 

 

                RDW (test code = RDW) 16.5 %          12.0 - 15.0 %   

 

                MPV (test code = MPV) 10.8 fL         7.0 - 10.0 fL   

 

                Platelet (test code = Platelet) 344 10*9/L      150 - 440 10*9/L

 



Magnesium Level (10/11/2020  4:00 AM EDT)2020-10-11 04:00:00





                Test Item       Value           Reference Range Comments

 

                Magnesium (test code = Magnesium) 2.1 mg/dL       1.6 - 2.2 mg/d

L 



Phosphorus Level (10/11/2020  4:00 AM EDT)2020-10-11 04:00:00





                Test Item       Value           Reference Range Comments

 

                Phosphorus (test code = Phosphorus) 3.2 mg/dL       2.9 - 4.7 mg

/dL 



Comprehensive Metabolic Panel (10/10/2020  3:55 AM EDT)2020-10-10 03:55:00





                Test Item       Value           Reference Range Comments

 

                Sodium (test code = Sodium) 142 mmol/L      135 - 145 mmol/L 

 

                Potassium (test code = Potassium) 3.9 mmol/L      3.5 - 5.0 mmol

/L 

 

                Chloride (test code = Chloride) 103 mmol/L      98 - 107 mmol/L 

 

                Anion Gap (test code = Anion Gap) 13 mmol/L       7 - 15 mmol/L 

  

 

                CO2 (test code = CO2) 26.0 mmol/L     22.0 - 30.0 mmol/L 

 

                BUN (test code = BUN) 4 mg/dL         7 - 21 mg/dL    

 

                Creatinine (test code = Creatinine) 0.63 mg/dL      0.70 - 1.30 

mg/dL 

 

                BUN/Creatinine Ratio (test code = 6                             

  



                BUN/Creatinine Ratio)                                 

 

                EGFR CKD-EPI Non-, Male (test >90             >=

60 mL/min/1.73m2 



                code = EGFR CKD-EPI Non-,                       

          



                Male)                                           

 

                EGFR CKD-EPI , Male (test >90             >=60 m

L/min/1.73m2 



                code = EGFR CKD-EPI , Male)                     

            

 

                Glucose (test code = Glucose) 115 mg/dL       70 - 179 mg/dL  

 

                Calcium (test code = Calcium) 8.6 mg/dL       8.5 - 10.2 mg/dL 

 

                Albumin (test code = Albumin) 2.9 g/dL        3.5 - 5.0 g/dL  

 

                Total Protein (test code = Total Protein) 6.0 g/dL        6.5 - 

8.3 g/dL  

 

                Total Bilirubin (test code = Total Bilirubin) 12.4 mg/dL      0.

0 - 1.2 mg/dL 

 

                AST (test code = AST) 65 U/L          19 - 55 U/L     

 

                ALT (test code = ALT) 112 U/L         <50 U/L         

 

                Alkaline Phosphatase (test code = Alkaline 641 U/L         38 - 

126 U/L    



                Phosphatase)                                    



CBC (10/10/2020  3:55 AM EDT)2020-10-10 03:55:00





                Test Item       Value           Reference Range Comments

 

                WBC (test code = WBC) 16.9 10*9/L     4.5 - 11.0 10*9/L 

 

                RBC (test code = RBC) 4.15 10*12/L    4.50 - 5.90 10*12/L 

 

                HGB (test code = HGB) 11.8 g/dL       13.5 - 17.5 g/dL 

 

                HCT (test code = HCT) 36.8 %          41.0 - 53.0 %   

 

                MCV (test code = MCV) 88.6 fL         80.0 - 100.0 fL 

 

                MCH (test code = MCH) 28.4 pg         26.0 - 34.0 pg  

 

                MCHC (test code = MCHC) 32.1 g/dL       31.0 - 37.0 g/dL 

 

                RDW (test code = RDW) 16.3 %          12.0 - 15.0 %   

 

                MPV (test code = MPV) 11.1 fL         7.0 - 10.0 fL   

 

                Platelet (test code = Platelet) 376 10*9/L      150 - 440 10*9/L

 



Magnesium Level (10/10/2020  3:55 AM EDT)2020-10-10 03:55:00





                Test Item       Value           Reference Range Comments

 

                Magnesium (test code = Magnesium) 1.9 mg/dL       1.6 - 2.2 mg/d

L 



Phosphorus Level (10/10/2020  3:55 AM EDT)2020-10-10 03:55:00





                Test Item       Value           Reference Range Comments

 

                Phosphorus (test code = Phosphorus) 3.9 mg/dL       2.9 - 4.7 mg

/dL 



Surgical pathology exam (10/09/2020  6:45 PM EDT)2020-10-09 18:45:00Final 
DiagnosisA: Pancreas, fine-needle biopsy: -Poorly differentiated adenocarcinoma 
with signet ring cell features. (See comment) This electronic signature is 
attestation that the pathologist personally reviewed the submitted material(s) 
and the final diagnosis reflects that evaluation.[facility] HospitalElectronically signed by Jo Ann Stephen MD on 10/15/2020 at 
5:45 PMCommentThe tumor is formed of malignant, poorly cohesive cells with 
marked nuclear pleomorphism and signet ring cell features. Mucicarmine shows 
focal, weak cytoplasmic staining. The tumor is positive for pancytokeratin, CK7,
patchy CK20 and is negative for CDX2 on immunostains. This immunoprofile 
supports the above diagnosis. [facility] HealthClinical 
HistoryPancreatic mass with biliary obstructionUNCH [facility] HospitalGross DescriptionA: Pancreas Received is an appropriately labeled 
container. Specimen A: Site: Pancreatic head Method: Biopsy Measurement: 8 x 6 x
2 mm Comment: Multiple tan-brown tissue cores, filtered into a mesh bag Block: 
A1, NTR (Blanco) [facility] HealthMicroscopic 
DescriptionMicroscopic examination substantiates the above diagnosis.Resident 
Physician: None AssignedUNCH [facility] HospitalDisclaimerUnless 
otherwise specified, specimens are preserved using 10% neutral buffered 
formalin. For cases in which immunohistochemical and/or in-situ hybridization 
stains are performed, the following statement applies: Appropriate controls for 
each stain (positive controls with or without negative controls) have been 
evaluated andstain as expected. These stains have not been separately validated 
for use on decalcified specimens and should be interpreted with caution in that 
setting. Some of the reagents used for these stains may be classified as analyte
specific reagents (ASR). Tests using ASRs were developed, and their performance 
characteristics were determined, by the Anatomic Pathology Department ([facility]). They have not been cleared or approved by the 
US Food and Drug Administration (FDA). The FDA does not require these tests to 
go through premarket FDA review. These tests are used for clinical purposes. 
They should not be regarded as investigational or for research. This laboratory 
is certified under the Clinical Laboratory Improvement Amendments (CLIA) as 
qualified to perform high complexity clinical laboratory testing.UNCH [facility] LABORATORIESEMBEDDED IMAGESUNCH [facility] CLINICAL LABORATORIESUpper 
Endoscopic Ultrasound (EUS) (10/09/2020  5:02 PM EDT)2020-10-09 17:02:29Upper 
Endoscopic Ultrasound (EUS) (10/09/2020 5:02 PM EDT)SpecimenNarrativePerformed 
At______________
_________________________________________________________________Patient Name: 
Leon Hagan       Procedure Date: 10/9/2020 5:02 PMMRN: [id number]
          YOB: 1970Admit Type: Inpatient  
      Age: 50Room[facility] [address] [place] [address]     Gender: 
MaleNote Status: Finalized        Instrument Name: 
[id number]___
____________________________________________________________________________ 
Procedure:       Upper EUSIndications:      Suspected mass
in pancreas on CT scanProviders:       MONICA LAKHANI MD, TRISTAN BLACK MD            (Fellow), MARIKA VERGARA ENGLISHReferring MD:     FAVIAN MYEER DO 
(Referring MD)Medicines:       General AnesthesiaComplications:  
   No immediate complications. Estimated blood loss:        
    Minimal.___________________________________________
____________________________________Procedure:       Pre-Anesthesia 
Assessment:           - Prior to the procedure, a History 
and Physical was           performed, and patient 
medications and allergies were            reviewed. The 
patient's tolerance of previous            anesthesia was 
also reviewed. The risks and benefits            of the 
procedure and the sedation optionsand risks            
were discussed with the patient. All questions were         
 answered, and informed consent was obtained. Prior         
  Anticoagulants: The patient has taken no previous         
   anticoagulant or antiplatelet agents. ASA Grade        
   Assessment: III - A patientwith severe systemic        
   disease. After reviewing the risks and benefits, the       
     patient was deemed in satisfactory condition to      
     undergo the procedure.            After 
obtaining informed consent, the endoscope was            
passed under direct vision. Throughout the procedure,         
  the patient's blood pressure, pulse, and oxygen         
  saturations were monitored continuously. The          
 Endosonoscope was introduced through the mouth, and         
   advanced to the second part of duodenum. The upper EUS      
      was accomplished without difficulty. The patient     
       tolerated the procedure well.          
                          
 Findings:   ENDOSONOGRAPHIC FINDING: :   A round mass was 
identified in the pancreatic head.The mass was   hypoechoic. The mass 
measured 48 mm in maximal cross-sectional diameter.   The outer margins were
irregular. The remainder of the pancreas was   examined. The endosonographic
appearance of parenchyma and the upstream   pancreatic duct indicated duct 
dilation. Fine needlebiopsy was   performed. Color Doppler imaging was 
utilized prior to needle puncture   to confirm a lack of significant 
vascular structures within the needle   path. Two passes were made with the 
22 gauge Lucent Sky biopsy needle   using a transduodenal approach. Visible 
cores of tissue were obtained.   Final pathology results are pending.   
There was diffuse dilation in the common bile duct.   There was dilation 
diffusely throughout the intrahepatic bile duct(s).   Moderate hyperechoic 
material consistent with sludge was visualized   endosonographically in the 
gallbladder body.                     
                 Impression:      
- A mass was identified in the pancreatichead. Fine         
  needle biopsy performed.            - Extrahepatic 
and intrahepatic biliary dilation.            - 
Hyperechoic material consistent with sludge was           
visualized endosonographically in the gallbladder body.Recommendation:    
- Perform an ERCP today.            - Await path 
results.                        
              Procedure Code(s):   --- 
Professional ---            79930, 
Esophagogastroduodenoscopy, flexible,            tra
nsoral; with transendoscopic ultrasound-guided            
intramural or transmural fine needle            
aspiration/biopsy(s), (includes endoscopic ultrasound         
  examination limited to the esophagus, stomach or         
   duodenum, and adjacent structures)Diagnosis Code(s):   --- 
Professional ---            K86.89, Other specified 
diseases of pancreas            K83.8, Other specified 
diseases of biliary tract            R93.3, Abnormal 
findings on diagnostic imaging of            other parts 
of digestive tract CPT copyright 2019 American Medical Association. All rights 
reserved. The codes documented in this report are preliminary and upon  
review may be revised to meet current compliance requirements.Attending 
Participation:   I was present and participated during the entire procedure,
including   non-key portions.                
                       
Electronically Signed by Monica Lakhani MD___________________MONICA LAKHANI MD10/2020 7:25:40 PMThe attending physician was present throughout the entire 
procedural suite including insertion, viewing, and removal. 
___________________________PAKRER ALEXANDREumber of Addenda: 0 Note 
Initiated On: 10/9/2020 5:02 [facility] RADProcedure NoteInterface, Rad Results In - 
10/09/2020 7:30 PM 
EDT____________________________________________________________________
___________ Patient Name: Leon Hagan Procedure Date: 10/9/2020 5:02 PM MRN: 
[id number] YOB: 1970 Admit Type: Inpatient Age: 50 Room: [address] Gender: Male Note Status: Finalized Instrument Name: 
[id number] ___________________________________________________________
____________________ Procedure: Upper EUS Indications: Suspected mass in 
pancreas on CT scan Providers: MONICA LAKHANI MD, TRISTAN BLACK MD 
(Fellow), MARIKA VERGARA Referring MD: FAVIAN MEYER DO 
(Referring MD) Medicines: General Anesthesia Complications: No immediate 
complications. Estimated blood loss: Minimal. 
_______________________________________________________________________________ 
Procedure: Pre-Anesthesia Assessment: - Prior to the procedure, a History and 
Physical was performed, and patient medications and allergies were reviewed. The
patient's tolerance of previous anesthesia was also reviewed. The risks and 
benefits of the procedure and the sedation options and risks were discussed with
the patient. All questions were answered, and informed consent was obtained. 
Prior Anticoagulants: The patient has taken no previous anticoagulant or 
antiplatelet agents. ASA Grade Assessment: III - A patient with severe systemic 
disease. After reviewing the risks and benefits, the patient was deemed in 
satisfactory condition to undergo the procedure. After obtaining informed 
consent, the endoscope was passed under direct vision. Throughout the procedure,
thepatient's blood pressure, pulse, and oxygen saturations were monitored 
continuously. The Endosonoscope was introduced through the mouth, and advanced 
to the second part of duodenum. The upper EUS was accomplished without 
difficulty. The patient tolerated the procedure well. Findings: ENDOSONOGRAPHIC 
FINDING: : A round mass was identified in the pancreatic head. The mass was 
hypoechoic. The mass measured 48 mm in maximal cross-sectional diameter. The 
outer margins were irregular. The remainder of thepancreas was examined. The 
endosonographic appearance of parenchyma and the upstream pancreatic duct
indicated duct dilation. Fine needle biopsy was performed. Color Doppler imaging
was utilized prior to needle puncture to confirm a lack of significant vascular 
structures within the needle path. Two passes were made with the 22 gauge 
Lucent Sky biopsy needle using a transduodenal approach. Visible cores of tissue 
were obtained. Final pathology results are pending. There was diffuse dilation 
in the common bile duct. There was dilation diffusely throughout the 
intrahepatic bile duct(s). Moderate hyperechoic material consistent with sludge 
was visualized endosonographically in the gallbladder body. Impression: - A mass
was identified in the pancreatic head. Fine needle biopsy performed. - 
Extrahepatic and intrahepatic biliary dilation. - Hyperechoic material 
consistent with sludge was visualized endosonographically in the gallbladder 
body. Recommendation: - Perform an ERCP today. - Await path results. Procedure 
Code(s): --- Professional --- 88658, Esophagogastroduodenoscopy, flexible, 
transoral; with transendoscopic ultrasound-guided intramural or transmural fine 
needle aspiration/biopsy(s), (includes endoscopic ultrasound examination limited
to the esophagus, stomach or duodenum, and adjacent structures) Diagnosis 
Code(s): --- Professional --- K86.89, Other specified diseases of pancreas 
K83.8, Other specified diseases of biliary tract R93.3, Abnormal findings on 
diagnostic imaging of other parts of digestive tract CPT copyright 2019 American
Medical Association. All rights reserved. The codes documented in this report 
are preliminary and upon  review may be revised to meet current compliance 
requirements. Attending Participation: I was present and participated during the
entire procedure, including non-key portions. Electronically Signed by Monica Lakhani MD ___________________ MONICA LAKHANI MD 10/9/2020 7:25:40 PM The 
attending physician was present throughout the entire procedural suite including
insertion, viewing, and removal. ___________________________ TRISTAN BLACK MD Number of Addenda: 0 Note Initiated On: 10/9/2020 5:02 PMPerforming 
OrganizationAddressCity/State/ZIP CodePhone NumberB[facility] and [facility] [place] RADEM [id number] [facility], [place] [address]VAMG (10/09/2020  3:07 PM EDT)2020-10-09 15:07:17ERCP 
(10/09/2020 3:07 PM EDT)SpecimenNarrativePerformed 
At___________________________________________
____________________________________Patient Name: Leon Hagan       
Procedure Date: 10/9/2020 3:07 PMMRN: [id number]          
YOB: 1970Admit Type: Inpatient         Age: 
50[address]     Gender: MaleNote Status: Finalized 
       Instrument Name: [id number] 
[id number]______________
_________________________________________________________________ Procedure:  
     ERCPIndications:      Abnormal abdominal CT, Jaundice, 
Malignant tumor of            the head of 
pancreasProviders:       MONICA LAKHANI MD, PIERRE PENN, TRISTAN BLACK MD (Fellow), MARIKA VERGARA MD:     FAVIAN MEYER DO 
(Referring MD)Medicines:       General AnesthesiaComplications:  
   No immediate complications. Estimated blood loss:       
    
Minimal.________________________________________________________________________

_______Procedure:       Pre-Anesthesia Assessment:      
     - Priorto the procedure, a History and Physical was     
       performed, and patientmedications and allergies were   
         reviewed. The patient's tolerance ofprevious   
        anesthesia was also reviewed. The risks and benefits  
         of the procedure and the sedation options and risks
           were discussed with the patient. All questions 
were            answered, and informed consent was 
obtained. Prior            Anticoagulants: The patient has
taken no previous            anticoagulant or antiplatelet
agents. ASA Grade            Assessment: III - A patient 
with severe systemic            disease. After reviewing 
the risks and benefits, the            patient was deemed 
in satisfactory condition to            undergo the p
rocedure.            After obtaining informed consent, the
scope was passed           under direct vision. Throughout
the procedure, the            patient's blood pressure, 
pulse, and oxygen            saturations were monitored 
continuously. The            Duodenoscope was introduced 
through the mouth, and            advanced to the duodenum
without successful            cannulation. The ERCP was 
accomplished without            difficulty. The patient 
tolerated the procedure well.            The Duodenoscope 
was introduced through the and            advanced to the 
duodenum. The ERCP was technically            difficult 
and complex due to challenging cannulation            
because of abnormal anatomy. The patient tolerated the        
  procedure well.                    
                    Findings:   No 
biopsies or other specimens were collected for this exam. The    film 
was normal. The scope was passed under direct vision through the   upper GI 
tract. Extrinsic compression on the duodenum was found in the   first 
portion of the duodenum, in the second portion of the duodenum and   in the 
area of the papilla by the pancreatic head mass. There was   associated 
diffuse severely congested, erythematous mucosa without   active bleeding 
and with no stigmata of bleeding was found in the entire   duodenum. The 
major papilla was difficult to locate due to pancreatic   tumor causing 
significant compression on the duodenum with mucosal   changes as described 
previously. The bile duct could not be cannulated   with the short-nosed 
traction sphincterotome and guidewire. A biliary   pre-cut sphincterotomy 
measuring 5 mm in length was made with a   monofilament sphincterotome using
ERBE electrocautery. The bile duct   still could not be cannulated despite 
the sphincterotomy. Minor bleeding   from the sphincterotomy occurred. 
Coagulation for hemostasis of bleeding   caused by the procedure in the 
ampulla using bipolar probe through the   ERCP scope was successful.   
                          
          Impression:      - Duodenal extrinsic 
compression with severely            congested, 
erythematous and friable mucosa was found            due 
to pancreatic tumor.            - A biliary sphincterotomy
was performed. Mild oozing            occurred. Hemostasis
of bleeding caused by the            procedure in the 
ampulla with bipolar cautery was           performed.  
          - Failed cannulation.          
  - No specimens collected.Recommendation:    - [facility].            - Clear liquid 
diet today.            - Continue present medications. 
Hold DVT ppx or            anticoagulation.     
      - Observe patient's clinical course.         
   - Would recommend EUS-guided hepaticogastrostomy and       
     possibly duodenal stent vs. EUS-guided         
  gastrojejunostomy given likely impending duodenal         
   obstruction.                   
                   Procedure Code(s):  
 --- Professional ---            80522, 
Esophagogastroduodenoscopy, flexible,           transoral;
diagnostic, including collection of           specimen(s) 
by brushing or washing, when performed            (sepa
rate procedure)Diagnosis Code(s):   --- Professional ---       
     K31.89, Other diseases of stomach and duodenum       
     R17, Unspecified jaundice            C25.0,
Malignant neoplasm of head of pancreas           K83.8, 
Other specified diseases of biliary tract            
R93.5, Abnormal findings on diagnostic imaging of           
 other abdominal regions, including retroperitoneum CPT copyright 2019 
American Medical Association. All rights reserved. The codes documented in this 
report are preliminary and upon  review may be revised to meet current comp
liance requirements.Attending Participation:   I was present and 
participated during the entire procedure, including   non-key portions.  
                          
           Electronically Signed by Monica Lakhani MD___________________MONICA LAKHANI MD10/2020 7:37:34 PMThe attending 
physician was present throughout the entire procedural suite including 
insertion, viewing, and removal. ___________________________PARKER ALEXANDREumber of Addenda: 0 Note Initiated On: 10/9/2020 3:07 [facility] 
RADProcedure NoteInterface, Rad Results In - 10/09/2020 7:42 PM 
EDT____________________________________________
___________________________________ Patient Name: Leon Hagan Procedure Date: 
10/9/2020 3:07 PM MRN:[id number] YOB: 1970 Admit Type: Inpatient
Age: 50 Room: [address] Gender: Male Note Status: Finalized 
Instrument Name: TJF-Q180V [id number] _________________
______________________________________________________________ Procedure: ERCP 
Indications: Abnormalabdominal CT, Jaundice, Malignant tumor of the head of 
pancreas Providers: MONICA LAKHANI MD, TRISTAN SILVA MD (Fellow), MARIKA VERGARA Referring MD: FAVIAN MEYER DO (Referring MD) Medicines: General Anesthesia Complications: No 
immediate complications. Estimated blood loss: Minimal. 
_______________________________________________________________________________ 
Procedure: Pre-Anesthesia Assessment: - Prior to the procedure, a History and 
Physical was performed, and patient medications and allergies were reviewed. The
patient's tolerance of previous anesthesia was also reviewed. The risks and 
benefits of the procedure and the sedation options and risks were discussed with
the patient. All questions were answered, and informed consent was obtained. 
Prior Anticoagulants: The patient has taken no previous anticoagulant or 
antiplatelet agents. ASA Grade Assessment: III - A patient with severe systemic 
disease. After reviewing the risks and benefits, the patient was deemed in 
satisfactory condition to undergo the procedure. After obtaining informed 
consent, the scope was passed under direct vision. Throughout the procedure, the
patient's bloodpressure, pulse, and oxygen saturations were monitored 
continuously. The Duodenoscope was introducedthrough the mouth, and advanced to 
the duodenum without successful cannulation. The ERCP was accomplished without 
difficulty. The patient tolerated the procedure well. The Duodenoscope was 
introduced through the and advanced to the duodenum. The ERCP was technically 
difficult and complex due to challenging cannulation because of abnormal 
anatomy. The patient tolerated the procedure well. Findings: Nobiopsies or other
specimens were collected for this exam. The  film was normal. The scope was
passed under direct vision through the upper GI tract. Extrinsic compression on 
the duodenum was foundin the first portion of the duodenum, in the second 
portion of the duodenum and in the area of the papilla by the pancreatic head 
mass. There was associated diffuse severely congested, erythematous mucosa 
without active bleeding and with no stigmata of bleeding was found in the entire
duodenum. The major papilla was difficult to locate due to pancreatic tumor 
causing significant compression on the duodenum with mucosal changes as 
described previously. The bile duct could not be cannulated with the short-nosed
traction sphincterotome and guidewire. A biliary pre-cut sphincterotomy 
measuring 5 mm in length was made with a monofilament sphincterotome using ERBE 
electrocautery. The bile duct still could not be cannulated despite the 
sphincterotomy. Minor bleeding from the sphincterotomy occurred. Coagulation for
hemostasis of bleeding caused by the procedure in the ampulla using bipolar 
probe through the ERCP scope was successful. Impression: - Duodenal extrinsic 
compression with severely congested, erythematous and friable mucosa was found 
due to pancreatic tumor. - A biliary sphincterotomy was performed. Mild oozing 
occurred. Hemostasis of bleeding caused by the procedure in the ampulla with bip
olar cautery was performed. - Failed cannulation. - No specimens collected. 
Recommendation: - [facility]. - Clear 
liquid diet today. - Continue present medications. Hold DVT ppx or 
anticoagulation. - Observe patient's clinical course. - Would recommend EUS-
guided hepaticogastrostomy and possibly duodenal stent vs. EUS-guided 
gastrojejunostomy given likely impending duodenal obstruction. Procedure 
Code(s): --- Professional --- 52898, Esophagogastroduodenoscopy, flexible, 
transoral; diagnostic, including collection of specimen(s) by brushing or 
washing, when performed (separate procedure) Diagnosis Code(s): --- Professional
--- K31.89, Other diseases of stomach and duodenum R17, Unspecified jaundice 
C25.0, Malignant neoplasm of head of pancreas K83.8, Other specified diseases of
biliary tract R93.5, Abnormal findings on diagnostic imaging of other abdominal 
regions, including retroperitoneum CPT copyright 2019 American Medical 
Association. All rights reserved. The codes documented in this report are 
preliminary and upon  review may be revised to meet current compliance 
requirements. Attending Participation: I was present and participated during the
entire procedure, including non-key portions. Electronically Signed by Monica Lakhani MD ___________________ MONICA LAKHANI MD 10/9/2020 7:37:34 PM The 
attending physician was present throughout the entire procedural suite including
insertion, viewing, and removal. ___________________________ TRISTAN BLACK MD Number of Addenda: 0 Note Initiated On: 10/9/2020 3:07 PMPerforming 
OrganizationAddressCity/State/ZIP CodePhone NumberEMC RADEMC [id number], [place] [email or web address] Metabolic Panel (10/09/2020  3:59 AM EDT)
2020-10-09 03:59:00





                Test Item       Value           Reference Range Comments

 

                Sodium (test code = Sodium) 140 mmol/L      135 - 145 mmol/L 

 

                Potassium (test code = Potassium) 4.4 mmol/L      3.5 - 5.0 mmol

/L 

 

                Chloride (test code = Chloride) 105 mmol/L      98 - 107 mmol/L 

 

                Anion Gap (test code = Anion Gap) 13 mmol/L       7 - 15 mmol/L 

  

 

                CO2 (test code = CO2) 22.0 mmol/L     22.0 - 30.0 mmol/L 

 

                BUN (test code = BUN) 3 mg/dL         7 - 21 mg/dL    

 

                Creatinine (test code = Creatinine) 0.50 mg/dL      0.70 - 1.30 

mg/dL 

 

                BUN/Creatinine Ratio (test code = 6                             

  



                BUN/Creatinine Ratio)                                 

 

                EGFR CKD-EPI Non-, Male (test >90             >=

60 mL/min/1.73m2 



                code = EGFR CKD-EPI Non-,                       

          



                Male)                                           

 

                EGFR CKD-EPI , Male (test >90             >=60 m

L/min/1.73m2 



                code = EGFR CKD-EPI , Male)                     

            

 

                Glucose (test code = Glucose) 114 mg/dL       70 - 99 mg/dL   

 

                Calcium (test code = Calcium) 8.6 mg/dL       8.5 - 10.2 mg/dL 

 

                Albumin (test code = Albumin) 3.0 g/dL        3.5 - 5.0 g/dL  

 

                Total Protein (test code = Total Protein) 6.1 g/dL        6.5 - 

8.3 g/dL  

 

                Total Bilirubin (test code = Total Bilirubin) 11.4 mg/dL      0.

0 - 1.2 mg/dL 

 

                AST (test code = AST) 93 U/L          19 - 55 U/L     

 

                ALT (test code = ALT) 121 U/L         <50 U/L         

 

                Alkaline Phosphatase (test code = Alkaline 752 U/L         38 - 

126 U/L    



                Phosphatase)                                    



CBC (10/09/2020  3:59 AM EDT)2020-10-09 03:59:00





                Test Item       Value           Reference Range Comments

 

                WBC (test code = WBC) 16.0 10*9/L     4.5 - 11.0 10*9/L 

 

                RBC (test code = RBC) 4.27 10*12/L    4.50 - 5.90 10*12/L 

 

                HGB (test code = HGB) 12.0 g/dL       13.5 - 17.5 g/dL 

 

                HCT (test code = HCT) 37.8 %          41.0 - 53.0 %   

 

                MCV (test code = MCV) 88.5 fL         80.0 - 100.0 fL 

 

                MCH (test code = MCH) 28.1 pg         26.0 - 34.0 pg  

 

                MCHC (test code = MCHC) 31.8 g/dL       31.0 - 37.0 g/dL 

 

                RDW (test code = RDW) 16.2 %          12.0 - 15.0 %   

 

                MPV (test code = MPV) 10.6 fL         7.0 - 10.0 fL   

 

                Platelet (test code = Platelet) 364 10*9/L      150 - 440 10*9/L

 



Magnesium Level (10/09/2020  3:59 AM EDT)2020-10-09 03:59:00





                Test Item       Value           Reference Range Comments

 

                Magnesium (test code = Magnesium) 2.0 mg/dL       1.6 - 2.2 mg/d

L 



Phosphorus Level (10/09/2020  3:59 AM EDT)2020-10-09 03:59:00





                Test Item       Value           Reference Range Comments

 

                Phosphorus (test code = Phosphorus) 1.8 mg/dL       2.9 - 4.7 mg

/dL 



COVID-19 PCR (10/08/2020  6:29 PM EDT)2020-10-08 18:29:00





                Test Item       Value           Reference Range Comments

 

                SARS-CoV-2 PCR (test code = SARS-CoV-2 PCR) Not Detected    Not 

Detected    



Basic Metabolic Panel (10/08/2020  2:27 PM EDT)2020-10-08 14:27:00





                Test Item       Value           Reference Range Comments

 

                Sodium (test code = Sodium) 133 mmol/L      135 - 145 mmol/L 

 

                Potassium (test code = Potassium) 3.2 mmol/L      3.5 - 5.0 mmol

/L 

 

                Chloride (test code = Chloride) 103 mmol/L      98 - 107 mmol/L 

 

                CO2 (test code = CO2) 21.0 mmol/L     22.0 - 30.0 mmol/L 

 

                Anion Gap (test code = Anion Gap) 9 mmol/L        7 - 15 mmol/L 

  

 

                BUN (test code = BUN) 4 mg/dL         7 - 21 mg/dL    

 

                Creatinine (test code = Creatinine) 0.54 mg/dL      0.70 - 1.30 

mg/dL 

 

                BUN/Creatinine Ratio (test code = 7                             

  



                BUN/Creatinine Ratio)                                 

 

                EGFR CKD-EPI Non-, Male (test >90             >=

60 mL/min/1.73m2 



                code = EGFR CKD-EPI Non-,                       

          



                Male)                                           

 

                EGFR CKD-EPI , Male (test >90             >=60 m

L/min/1.73m2 



                code = EGFR CKD-EPI , Male)                     

            

 

                Glucose (test code = Glucose) 132 mg/dL       70 - 179 mg/dL  

 

                Calcium (test code = Calcium) 8.5 mg/dL       8.5 - 10.2 mg/dL 



Comprehensive Metabolic Panel (10/08/2020  3:40 AM EDT)2020-10-08 03:40:00





                Test Item       Value           Reference Range Comments

 

                Sodium (test code = Sodium) 139 mmol/L      135 - 145 mmol/L 

 

                Potassium (test code = Potassium) 2.8 mmol/L      3.5 - 5.0 mmol

/L 

 

                Chloride (test code = Chloride) 105 mmol/L      98 - 107 mmol/L 

 

                Anion Gap (test code = Anion Gap) 13 mmol/L       7 - 15 mmol/L 

  

 

                CO2 (test code = CO2) 21.0 mmol/L     22.0 - 30.0 mmol/L 

 

                BUN (test code = BUN) 5 mg/dL         7 - 21 mg/dL    

 

                Creatinine (test code = Creatinine) 0.57 mg/dL      0.70 - 1.30 

mg/dL 

 

                BUN/Creatinine Ratio (test code = 9                             

  



                BUN/Creatinine Ratio)                                 

 

                EGFR CKD-EPI Non-, Male (test >90             >=

60 mL/min/1.73m2 



                code = EGFR CKD-EPI Non-,                       

          



                Male)                                           

 

                EGFR CKD-EPI , Male (test >90             >=60 m

L/min/1.73m2 



                code = EGFR CKD-EPI , Male)                     

            

 

                Glucose (test code = Glucose) 97 mg/dL        70 - 179 mg/dL  

 

                Calcium (test code = Calcium) 8.4 mg/dL       8.5 - 10.2 mg/dL 

 

                Albumin (test code = Albumin) 3.0 g/dL        3.5 - 5.0 g/dL  

 

                Total Protein (test code = Total Protein) 6.2 g/dL        6.5 - 

8.3 g/dL  

 

                Total Bilirubin (test code = Total Bilirubin) 12.0 mg/dL      0.

0 - 1.2 mg/dL 

 

                AST (test code = AST) 79 U/L          19 - 55 U/L     

 

                ALT (test code = ALT) 120 U/L         <50 U/L         

 

                Alkaline Phosphatase (test code = Alkaline 755 U/L         38 - 

126 U/L    



                Phosphatase)                                    



CBC (10/08/2020  3:40 AM EDT)2020-10-08 03:40:00





                Test Item       Value           Reference Range Comments

 

                WBC (test code = WBC) 17.3 10*9/L     4.5 - 11.0 10*9/L 

 

                RBC (test code = RBC) 4.07 10*12/L    4.50 - 5.90 10*12/L 

 

                HGB (test code = HGB) 11.7 g/dL       13.5 - 17.5 g/dL 

 

                HCT (test code = HCT) 35.8 %          41.0 - 53.0 %   

 

                MCV (test code = MCV) 88.0 fL         80.0 - 100.0 fL 

 

                MCH (test code = MCH) 28.8 pg         26.0 - 34.0 pg  

 

                MCHC (test code = MCHC) 32.7 g/dL       31.0 - 37.0 g/dL 

 

                RDW (test code = RDW) 16.0 %          12.0 - 15.0 %   

 

                MPV (test code = MPV) 10.5 fL         7.0 - 10.0 fL   

 

                Platelet (test code = Platelet) 367 10*9/L      150 - 440 10*9/L

 



Magnesium Level (10/08/2020  3:40 AM EDT)2020-10-08 03:40:00





                Test Item       Value           Reference Range Comments

 

                Magnesium (test code = Magnesium) 1.8 mg/dL       1.6 - 2.2 mg/d

L 



Phosphorus Level (10/08/2020  3:40 AM EDT)2020-10-08 03:40:00





                Test Item       Value           Reference Range Comments

 

                Phosphorus (test code = Phosphorus) 3.1 mg/dL       2.9 - 4.7 mg

/dL 



Cancer Antigen 19-9 (10/08/2020  3:40 AM EDT)2020-10-08 03:40:00





                Test Item       Value           Reference Range Comments

 

                CA 19-9 (test code = CA 19-9) 1120 U/mL       0 - 36.9 U/mL   



CEA (10/08/2020  3:40 AM EDT)2020-10-08 03:40:00





                Test Item       Value           Reference Range Comments

 

                CEA (test code = CEA) 1.3 ng/mL       0.0 - 5.0 ng/mL 



CT Body Outside Film For Continued Care (10/08/2020  3:58 AM EDT)2020-10-08 
00:00:00CT Body Outside Film For Continued Care (10/08/2020 3:58 AM 
EDT)SpecimenNarrativePerformed AtThese images were imported for continued care 
purposes only. They will not be interpreted and no charges will 
apply.UNCHCSRADPerforming OrganizationAddressCity/State/ZIP CodePhone 
NumberUNCHCSRADUS Outside Film For Continued Care (10/08/2020  3:58 AM EDT)
2020-10-08 00:00:00US Outside Film For Continued Care (10/08/2020 3:58 AM 
EDT)SpecimenNarrativePerformed AtThese imageswere imported for continued care 
purposes only. They will not be interpreted and no charges will appl
y.UNCHCSRADPerforming OrganizationAddressCity/State/ZIP CodePhone 
NumberUNCHCSRAD



Encounters







        Start   End     Encounter Admission Attending Care    Care    Encounter



        Date/Time Date/Time Type    Type    Clinicians Facility Department ID

 

        2020-10-13         Inpatient ANDRE LOCKE, [facility]     [id number]



        13:28:03                         KAROLINE                 [id number]



                                                                [id number]

 

        2020-10-12         Inpatient         COURTNEY [facility]     [id number]

_



        00:00:00                         PAMELA                   [id number]

 

        2020-10-09         Inpatient         GISSEL, [facility]     [id number]

_



        00:00:00                         DIANA                   [id number]

 

        2020-10-07         Inpatient ANDRE LOCKE [facility]     [id number]



        00:00:00                         KAROLINE                 [id number]

 

        [facility]

[id number]



        00:00:00 00:00:00 Sonja Chatterjee rn Medical Medical 



                                                Oncology Oncology 



                                                Center  Center  

 

        2020 Outpatient                 [facility]

 2020



        00:00:00 00:00:00                         rn Medical Medical 



                                                Oncology Oncology 



                                                Center  [place]  

 

        2020 Outpatient                 [facility]

 2020



        00:00:00 00:00:00                         rn Medical Medical 



                                                Oncology Oncology 



                                                Center  [place]  

 

        2020 Outpatient                 [facility]

 2020



        00:00:00 00:00:00                         rn Medical Medical 



                                                Oncology Oncology 



                                                Center  [place]  

 

        2020 [facility]

[id number]



        00:00:00 00:00:00 Sonja Chatterjee rn Medical Medical 



                                                Oncology Oncology 



                                                Center  [place]  

 

        2020-10-26 2020-10-26 Outpatient         Eddy Quintero [facility]

n [id number]



        00:00:00 00:00:00                 Sen kuhn [facility] Medical 



                                                Oncology Oncology 



                                                Center  [place]  

 

        2020-10-16 2020-10-16 Outpatient                 [facility]  [id number]

[id number]



        00:00:00 00:00:00                                         

 

        2020-10-08 2020-10-15 Inpatient                 [facility]  [id number]

[id number]



        02:48:00 18:07:00                                         

 

        2020-10-08 2020-10-15 Inpatient UR      THANG LOCKECS  [facility]     [id number]

00_



        02:48:00 18:07:00                 KAROLINE                 [id number]



                                                                [phone]

 

        2020-10-15 2020-10-15 Outpatient                 [facility]

 2020



        00:00:00 00:00:00                         rn Medical Medical 



                                                Oncology Oncology 



                                                Center  [place]  

 

        2020-10-15 2020-10-15 Outpatient                 [facility]  [id number]

[id number]



        00:00:00 00:00:00                                         

 

        2020-10-09 2020-10-09 Inpatient UR      BARRY [facility]  [facility]     [id number]

00_



        15:03:03 23:59:00                 KAROLINE                 [id number]



                                                                [id number]

 

        2020-10-09 2020-10-09 Outpatient                 Eddy [facility]

 2020



        00:00:00 00:00:00                         rn Medical Medical 



                                                Oncology Oncology 



                                                Center  [place]  

 

        2020-10-08 2020-10-08 Inpatient UR      THANG LOCKECS  [facility]     [id number]

00_



        03:55:15 23:59:00                 KAROLINE                 [id number]



                                                                515

 

        2020-10-08 2020-10-08 Inpatient UR      THANG LOCKECS  [facility]     [id number]

00_



        03:54:23 23:59:00                 KAROLINE                 [id number]



                                                                [facility]

 

        2020-10-08 2020-10-08 Outpatient                 [facility]

 [id number]



        00:00:00 00:00:00                         rn Medical Medical 



                                                Oncology Oncology 



                                                Center  [place]  

 

        2020-10-07 2020-10-07 Outpatient                 [facility]

 [id number]



        00:00:00 00:00:00                         rn Medical Medical 



                                                Oncology Oncology 



                                                Center  Center  







Immunizations







           Ordered Immunization Filled Immunization Date       Status     Commen

ts   Refusal Reason



           Name       Name                                        

 

           Influenza Vaccine            2020-10-15 Cancelled             



           Quad (IIV4 PF) 6mo+            00:00:00                         



           injectable                                             







Plan of Treatment







                Planned Activity Planned Date    Details         Comments

 

                Future Scheduled Test                  [code = ]      

 

                Future Scheduled Test                  [code = ]      

 

                Future Scheduled Test                  [code = ]      

 

                Future Scheduled Test                  [code = ]      

 

                Future Scheduled Test                  [code = ]      

 

                Future Scheduled Test                  [code = ]      

 

                Future Scheduled Test                  [code = ]      

 

                Future Scheduled Test                  [code = ]      

 

                Future Scheduled Test                  [code = ]      

 

                Future Scheduled Test                  [code = ]      

 

                Medication      2020 00:00:00 5-FU PUMP [code = 5-FU PUMP]

 

 

                Medication      2020 00:00:00 Atropine Sulfate [code = 119

0556] 

 

                Medication      2020 00:00:00 Dexamethasone Sodium Phospha

te 



                                                [code = 065268] 

 

                Medication      2020 00:00:00 Dextrose [code = 4240379] 

 

                Medication      2020 00:00:00 Famotidine [code = 7698208] 

 

                Medication      2020 00:00:00 Fluorouracil [code = 880229]

 

 

                Medication      2020 00:00:00 Irinotecan HCl [code = 37528

24] 

 

                Medication      2020 00:00:00 Leucovorin Calcium [code = 



                                                9608520]        

 

                Medication      2020 00:00:00 Ondansetron HCl [code = 3120

85] 

 

                Medication      2020 00:00:00 Oxaliplatin [code = 7051798]

 







Social History







                Social Habit    Start Date      Stop Date       Comments

 

                Tobacco smoking status NHIS 2020-10-13 00:00:00 2020-10-13 00:00

:00 

 

                Tobacco use and exposure 2020-10-13 00:00:00 2020-10-13 00:00:00

 

 

                Alcohol intake  2020-10-13 00:00:00 2020-10-13 00:00:00 

 

                History SDOH Food Worry 2020-10-08 00:00:00 2020-10-08 00:00:00 

 

                History SDOH Food Scarcity 2020-10-08 00:00:00 2020-10-08 00:00:

00 









                    Smoking Status      Start Date          Stop Date

 

                    Never                                   2020 16:36:50









                          Social History Observation Description

 

                          Birth Sex                 Male







Vital Signs







                Vital Name      Observation Time Observation Value Comments

 

                BMI             2020 15:35:16 19.9500         

 

                BP oliva         2020 15:35:16 49.0000 mm[Hg]  

 

                Bdy height      2020 15:35:16 67.3200 [in_i]  

 

                SaO2% BldA PulseOx 2020 15:35:16 92.0000 %       

 

                Heart rate      2020 15:35:16 87.0000 /min    

 

                Resp rate       2020 15:35:16 16.0000 /min    

 

                BP sys          2020 15:35:16 72.0000 mm[Hg]  

 

                Body temperature 2020 15:35:16 97.3000 [degF]  

 

                Weight          2020 15:35:16 128.6000 [lb_av] 

 

                BP oliva         2020 15:52:46 59.0000 mm[Hg]  

 

                Bdy height      2020 15:52:46 67.3200 [in_i]  

 

                SaO2% BldA PulseOx 2020 15:52:46 97.0000 %       

 

                Heart rate      2020 15:52:46 105.0000 /min   

 

                Resp rate       2020 15:52:46 22.0000 /min    

 

                BP sys          2020 15:52:46 119.0000 mm[Hg] 

 

                Body temperature 2020 15:52:46 97.6000 [degF]  

 

                Weight          2020 15:52:46 132.0000 [lb_av] 

 

                BMI             2020 15:52:46 20.4800         

 

                Systolic blood pressure 2020-10-15 11:41:00 95 mm[Hg]       

 

                Diastolic blood pressure 2020-10-15 11:41:00 58 mm[Hg]       

 

                Heart rate      2020-10-15 11:41:00 86 /min         

 

                Body temperature 2020-10-15 11:41:00 36.72 Kaelyn       

 

                Respiratory rate 2020-10-15 11:41:00 20 /min         

 

                Oxygen saturation in Arterial blood by 2020-10-15 11:41:00 99 % 

           



                Pulse oximetry                                  

 

                Body height     2020-10-08 02:45:00 170.2 cm        

 

                Body weight     2020-10-08 02:45:00 62.551 kg

## 2020-11-15 ENCOUNTER — HOSPITAL ENCOUNTER (INPATIENT)
Dept: HOSPITAL 62 - ER | Age: 50
LOS: 18 days | Discharge: HOSPICE | DRG: 315 | End: 2020-12-03
Attending: INTERNAL MEDICINE | Admitting: INTERNAL MEDICINE
Payer: COMMERCIAL

## 2020-11-15 DIAGNOSIS — K59.00: ICD-10-CM

## 2020-11-15 DIAGNOSIS — I82.A12: ICD-10-CM

## 2020-11-15 DIAGNOSIS — T82.868A: ICD-10-CM

## 2020-11-15 DIAGNOSIS — R13.10: ICD-10-CM

## 2020-11-15 DIAGNOSIS — E86.0: ICD-10-CM

## 2020-11-15 DIAGNOSIS — E86.1: ICD-10-CM

## 2020-11-15 DIAGNOSIS — Z66: ICD-10-CM

## 2020-11-15 DIAGNOSIS — C25.9: ICD-10-CM

## 2020-11-15 DIAGNOSIS — D70.1: ICD-10-CM

## 2020-11-15 DIAGNOSIS — R06.6: ICD-10-CM
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
## 2020-11-15 DIAGNOSIS — T45.1X5A: ICD-10-CM

## 2020-11-15 DIAGNOSIS — C78.7: ICD-10-CM

## 2020-11-15 DIAGNOSIS — M79.662: ICD-10-CM

## 2020-11-15 DIAGNOSIS — I82.B12: ICD-10-CM

## 2020-11-15 DIAGNOSIS — I95.89: Primary | ICD-10-CM

## 2020-11-15 DIAGNOSIS — N17.9: ICD-10-CM

## 2020-11-15 DIAGNOSIS — Z80.0: ICD-10-CM

## 2020-11-15 DIAGNOSIS — E44.0: ICD-10-CM

## 2020-11-15 DIAGNOSIS — Z79.899: ICD-10-CM

## 2020-11-15 LAB
ABSOLUTE LYMPHOCYTES# (MANUAL): 0.3 10^3/UL (ref 0.5–4.7)
ABSOLUTE MONOCYTES # (MANUAL): 2.1 10^3/UL (ref 0.1–1.4)
ADD MANUAL DIFF: YES
ALBUMIN SERPL-MCNC: 2.8 G/DL (ref 3.5–5)
ALP SERPL-CCNC: 473 U/L (ref 38–126)
ANION GAP SERPL CALC-SCNC: 12 MMOL/L (ref 5–19)
AST SERPL-CCNC: 31 U/L (ref 17–59)
BASOPHILS NFR BLD MANUAL: 0 % (ref 0–2)
BILIRUB DIRECT SERPL-MCNC: 1 MG/DL (ref 0–0.4)
BILIRUB SERPL-MCNC: 1.6 MG/DL (ref 0.2–1.3)
BUN SERPL-MCNC: 23 MG/DL (ref 7–20)
CALCIUM: 8.9 MG/DL (ref 8.4–10.2)
CHLORIDE SERPL-SCNC: 101 MMOL/L (ref 98–107)
CO2 SERPL-SCNC: 21 MMOL/L (ref 22–30)
EOSINOPHIL NFR BLD MANUAL: 2 % (ref 0–6)
ERYTHROCYTE [DISTWIDTH] IN BLOOD BY AUTOMATED COUNT: 16.9 % (ref 11.5–14)
GLUCOSE SERPL-MCNC: 126 MG/DL (ref 75–110)
HCT VFR BLD CALC: 29.7 % (ref 37.9–51)
HGB BLD-MCNC: 9.6 G/DL (ref 13.5–17)
MCH RBC QN AUTO: 27.3 PG (ref 27–33.4)
MCHC RBC AUTO-ENTMCNC: 32.4 G/DL (ref 32–36)
MCV RBC AUTO: 84 FL (ref 80–97)
MONOCYTES % (MANUAL): 7 % (ref 3–13)
PLATELET # BLD: 426 10^3/UL (ref 150–450)
POTASSIUM SERPL-SCNC: 3.6 MMOL/L (ref 3.6–5)
PROT SERPL-MCNC: 6.6 G/DL (ref 6.3–8.2)
RBC # BLD AUTO: 3.53 10^6/UL (ref 4.35–5.55)
SEGMENTED NEUTROPHILS % (MAN): 90 % (ref 42–78)
TOTAL CELLS COUNTED BLD: 100
VARIANT LYMPHS NFR BLD MANUAL: 1 % (ref 13–45)

## 2020-11-15 PROCEDURE — 86850 RBC ANTIBODY SCREEN: CPT

## 2020-11-15 PROCEDURE — 82803 BLOOD GASES ANY COMBINATION: CPT

## 2020-11-15 PROCEDURE — 82140 ASSAY OF AMMONIA: CPT

## 2020-11-15 PROCEDURE — 85610 PROTHROMBIN TIME: CPT

## 2020-11-15 PROCEDURE — 87150 DNA/RNA AMPLIFIED PROBE: CPT

## 2020-11-15 PROCEDURE — 86900 BLOOD TYPING SEROLOGIC ABO: CPT

## 2020-11-15 PROCEDURE — 36430 TRANSFUSION BLD/BLD COMPNT: CPT

## 2020-11-15 PROCEDURE — 84134 ASSAY OF PREALBUMIN: CPT

## 2020-11-15 PROCEDURE — 96416 CHEMO PROLONG INFUSE W/PUMP: CPT

## 2020-11-15 PROCEDURE — 86901 BLOOD TYPING SEROLOGIC RH(D): CPT

## 2020-11-15 PROCEDURE — 93010 ELECTROCARDIOGRAM REPORT: CPT

## 2020-11-15 PROCEDURE — 83605 ASSAY OF LACTIC ACID: CPT

## 2020-11-15 PROCEDURE — 84132 ASSAY OF SERUM POTASSIUM: CPT

## 2020-11-15 PROCEDURE — 87040 BLOOD CULTURE FOR BACTERIA: CPT

## 2020-11-15 PROCEDURE — 96411 CHEMO IV PUSH ADDL DRUG: CPT

## 2020-11-15 PROCEDURE — C9113 INJ PANTOPRAZOLE SODIUM, VIA: HCPCS

## 2020-11-15 PROCEDURE — 96417 CHEMO IV INFUS EACH ADDL SEQ: CPT

## 2020-11-15 PROCEDURE — S0028 INJECTION, FAMOTIDINE, 20 MG: HCPCS

## 2020-11-15 PROCEDURE — 99285 EMERGENCY DEPT VISIT HI MDM: CPT

## 2020-11-15 PROCEDURE — 80053 COMPREHEN METABOLIC PANEL: CPT

## 2020-11-15 PROCEDURE — 71045 X-RAY EXAM CHEST 1 VIEW: CPT

## 2020-11-15 PROCEDURE — 96361 HYDRATE IV INFUSION ADD-ON: CPT

## 2020-11-15 PROCEDURE — 93005 ELECTROCARDIOGRAM TRACING: CPT

## 2020-11-15 PROCEDURE — 80048 BASIC METABOLIC PNL TOTAL CA: CPT

## 2020-11-15 PROCEDURE — 93971 EXTREMITY STUDY: CPT

## 2020-11-15 PROCEDURE — 86920 COMPATIBILITY TEST SPIN: CPT

## 2020-11-15 PROCEDURE — 85025 COMPLETE CBC W/AUTO DIFF WBC: CPT

## 2020-11-15 PROCEDURE — 80202 ASSAY OF VANCOMYCIN: CPT

## 2020-11-15 PROCEDURE — 87070 CULTURE OTHR SPECIMN AEROBIC: CPT

## 2020-11-15 PROCEDURE — 83735 ASSAY OF MAGNESIUM: CPT

## 2020-11-15 PROCEDURE — 82962 GLUCOSE BLOOD TEST: CPT

## 2020-11-15 PROCEDURE — 82565 ASSAY OF CREATININE: CPT

## 2020-11-15 PROCEDURE — 36415 COLL VENOUS BLD VENIPUNCTURE: CPT

## 2020-11-15 PROCEDURE — P9016 RBC LEUKOCYTES REDUCED: HCPCS

## 2020-11-15 PROCEDURE — 83690 ASSAY OF LIPASE: CPT

## 2020-11-15 PROCEDURE — 96367 TX/PROPH/DG ADDL SEQ IV INF: CPT

## 2020-11-15 PROCEDURE — 84478 ASSAY OF TRIGLYCERIDES: CPT

## 2020-11-15 PROCEDURE — 94799 UNLISTED PULMONARY SVC/PX: CPT

## 2020-11-15 PROCEDURE — 84484 ASSAY OF TROPONIN QUANT: CPT

## 2020-11-15 PROCEDURE — 80076 HEPATIC FUNCTION PANEL: CPT

## 2020-11-15 PROCEDURE — 87186 SC STD MICRODIL/AGAR DIL: CPT

## 2020-11-15 PROCEDURE — 96365 THER/PROPH/DIAG IV INF INIT: CPT

## 2020-11-15 PROCEDURE — 84100 ASSAY OF PHOSPHORUS: CPT

## 2020-11-15 PROCEDURE — 87086 URINE CULTURE/COLONY COUNT: CPT

## 2020-11-15 PROCEDURE — 85730 THROMBOPLASTIN TIME PARTIAL: CPT

## 2020-11-15 PROCEDURE — 74177 CT ABD & PELVIS W/CONTRAST: CPT

## 2020-11-15 PROCEDURE — 96413 CHEMO IV INFUSION 1 HR: CPT

## 2020-11-15 PROCEDURE — 71046 X-RAY EXAM CHEST 2 VIEWS: CPT

## 2020-11-15 PROCEDURE — 36600 WITHDRAWAL OF ARTERIAL BLOOD: CPT

## 2020-11-15 PROCEDURE — 85027 COMPLETE CBC AUTOMATED: CPT

## 2020-11-15 PROCEDURE — 96375 TX/PRO/DX INJ NEW DRUG ADDON: CPT

## 2020-11-15 PROCEDURE — 87077 CULTURE AEROBIC IDENTIFY: CPT

## 2020-11-15 PROCEDURE — 81001 URINALYSIS AUTO W/SCOPE: CPT

## 2020-11-15 NOTE — RADIOLOGY REPORT (SQ)
AP Portable chest: 11/15/2020 10:25 PM CST



History: 50-year old patient with hypotension.



Comparison: Chest radiograph performed 11/6/2020.



Findings: The cardiomediastinal silhouette is normal in size. No

pneumothorax is seen. No discrete pleural effusion is apparent. A

left subclavian Cbzspv-d-Nsgr catheter tip projects near the

superior SVC. There is prominence of the bilateral hilar regions.

This may be due to developing infection. Interval follow-up

particularly at the right hilar region is recommended to exclude

a developing nodule. The lung volumes are low.



Impression: There is prominence of the bilateral hilar regions.

This may be due to developing infection. Interval follow-up

particularly at the right hilar region is recommended to exclude

a developing nodule.

## 2020-11-15 NOTE — ER DOCUMENT REPORT
ED General





- General


Chief Complaint: Nausea/Vomiting


Stated Complaint: GENERAL WEAKNESS


Time Seen by Provider: 11/15/20 22:31


Primary Care Provider: 


CHERRY JAVIER MD [ACTIVE STAFF] - Follow up as needed


Mode of Arrival: Wheelchair


Notes: 


Patient is a 50-year-old female that comes emergency department for chief 

complaint of nausea, vomiting, worsening abdominal pain.  Patient states he s

tarted feeling worse yesterday and then vomited 10 times throughout the day, was

unable to take his pain medication, he was taking his Zofran without relief.  He

has a history of pancreatic cancer, he was scheduled to start his initial 

treatments on Tuesday with Dr. Javier he has not completed any, radiation or 

chemotherapy yet.  He denies fever, chest pain, passing out.








TRAVEL OUTSIDE OF THE [place] IN LAST 30 DAYS: No





- Related Data


Allergies/Adverse Reactions: 


                                        





No Known Allergies Allergy (Verified 11/05/20 08:58)


   











Past Medical History





- General


Information source: Patient





- Social History


Smoking Status: Never Smoker


Chew tobacco use (# tins/day): No


Frequency of alcohol use: None


Drug Abuse: None


Lives with: Family


Family History: Reviewed & Not Pertinent, Other - Significant for pancreatic 

cancer in grandmother.  denies: CAD


Patient has homicidal ideation: No





- Past Medical History


Cardiac Medical History: 


   Denies: Hx Congestive Heart Failure, Hx Heart Attack, Hx Hypertension


Pulmonary Medical History: 


   Denies: Hx Asthma, Hx Bronchitis, Hx COPD, Hx Pneumonia, Hx Tuberculosis


Neurological Medical History: Denies: Hx Seizures, Hx Parkinson's Disease


Renal/ Medical History: Denies: Hx Benign Prostatic Hyperplasia, Hx End Stage 

Renal Disease, Hx Kidney Stones


Malignancy Medical History: Reports Hx Pancreatic Cancer - With metastasis to 

the liver


GI Medical History: Denies: Hx Cirrhosis, Hx Gastroesophageal Reflux Disease, Hx

Ulcer


Musculoskeletal Medical History: Denies Hx Arthritis, Denies Hx Multiple 

Sclerosis


Psychiatric Medical History: 


   Denies: Hx Bipolar Disorder, Hx Depression, Hx Schizophrenia


Past Surgical History: Reports: Other - Pigtail catheter from common bile duct 

to stomach.





Review of Systems





- Review of Systems


Constitutional: See HPI


EENT: No symptoms reported


Cardiovascular: No symptoms reported


Respiratory: No symptoms reported


Gastrointestinal: See HPI


Genitourinary: No symptoms reported


Male Genitourinary: No symptoms reported


Musculoskeletal: No symptoms reported


Skin: No symptoms reported


Hematologic/Lymphatic: No symptoms reported


Neurological/Psychological: No symptoms reported





Physical Exam





- Vital signs


Vitals: 


                                        











Temp Pulse Resp BP Pulse Ox


 


 98.1 F   132 H  24 H  83/52 L  98 


 


 11/15/20 22:36  11/15/20 22:36  11/15/20 22:36  11/15/20 22:36  11/15/20 22:36














- Notes


Notes: 





GENERAL: Alert and responsive but pale, both acutely and chronically ill-

appearing


HEAD: Normocephalic, atraumatic.


EYES: Pupils equal, round, and reactive to light. Extraocular movements intact.


ENT: Oral mucosa very dry, tongue midline. Oropharynx unremarkable. Airway 

patent. 


NECK: Full range of motion. Supple. Trachea midline. No lymphadenopathy.


LUNGS: Clear to auscultation bilaterally, no wheezes, rales, or rhonchi. No 

respiratory distress. Non-tender chest wall. 


HEART: Regular rate and rhythm. No murmur


ABDOMEN: Generalized abdominal tenderness throughout although no guarding, 

rigidity, overt distention.  Bowel sounds are present.


EXTREMITIES: Moves all 4 extremities spontaneously.  There is some edema to the 

general left upper extremity.  Otherwise unremarkable in regards to edema.  

Normal radial and dorsalis pedis pulses bilaterally. No cyanosis.


BACK: no cervical, thoracic, lumbar midline tenderness. No saddle anesthesia, 

normal distal neurovascular exam. Moves all extremities in full range of motion.


NEUROLOGICAL: Alert and oriented x3. Normal speech. Cranial nerves II through 

XII grossly intact. Strength 5/5 in all extremities. 


PSYCH: Normal affect, normal mood.


SKIN: Pale





Course





- Re-evaluation


Re-evalutation: 





11/15/20 22:49


Patient initially very tachycardic, hypotensive, diaphoretic, pale, acutely and 

chronically ill-appearing.  However he is alert, responsive, only complains of 

abdominal pain.  Abdomen is not rigid but does have generalized tenderness in 

the mid abdomen.  Placed IV, placed on monitor, giving IV fluids and 

medications.  Work-up pending.  Initially 10 mg of Reglan were ordered from 

triage, fortunately only 5 mg were given and I have canceled the rest of this 

because patient has a prolonged QTC.





CBC shows leukocytosis of 30 with elevation of neutrophils but no bandemia.  

Hemoglobin 9.6 and normocytic.  Creatinine 1.26.  Bilirubin minimally elevated, 

nonspecific.  Lipase unremarkable.  Troponin not elevated.  Urinalysis obtained 

and unremarkable.  Chest x-ray questionable for infiltrate although patient does

not have cough, shortness of breath, hypoxia, fever.  CAT scan of the abdomen 

pelvis from triage reviewed and shows no acute change of pancreatic mass, mets, 

biliary stent.  Also shows possible pneumonia.





Patient initially improved with resolution of hypotension, tachycardia resolved,

however patient became hypotensive again down into the 80s, he was given an 

additional IV fluid bolus.  Because of possible pneumonia, leukocytosis, very 

ill appearance, IV antibiotics were started, blood cultures pending, lactic acid

checked but unremarkable.  Patient is extremely weak and does not feel prepared 

to go home.  Will discuss with patient's oncologist and with hospitalist for 

admission for hypotension, leukocytosis, possible pneumonia, intractable 

vomiting.








11/16/20 


Called and spoke with Dr. Javier, he is in agreement with admission and will 

consult on the patient.





Called spoke with Dr. Merino, he evaluated the patient, he recommends [facility] 

admission.  Patient and mother state appreciation and agreement.








- Vital Signs


Vital signs: 


                                        











Temp Pulse Resp BP Pulse Ox


 


 97.5 F   132 H  25 H  85/55 L  96 


 


 11/16/20 01:39  11/15/20 22:36  11/16/20 01:23  11/16/20 01:23  11/16/20 01:23














- Laboratory


Result Diagrams: 


                                 11/15/20 22:52





                                 11/15/20 22:52


Laboratory results interpreted by me: 


                                        











  11/15/20 11/15/20 11/16/20





  22:52 22:52 02:35


 


WBC  30.3 H*  


 


RBC  3.53 L  


 


Hgb  9.6 L  


 


Hct  29.7 L  


 


RDW  16.9 H  


 


Seg Neuts % (Manual)  90 H  


 


Lymphocytes % (Manual)  1 L  


 


Abs Neuts (Manual)  27.3 H  


 


Abs Lymphs (Manual)  0.3 L  


 


Abs Monocytes (Manual)  2.1 H  


 


Sodium   134.3 L 


 


Carbon Dioxide   21 L 


 


BUN   23 H 


 


Creatinine   1.26 H 


 


Glucose   126 H 


 


Total Bilirubin   1.6 H 


 


Direct Bilirubin   1.0 H 


 


Alkaline Phosphatase   473 H 


 


Albumin   2.8 L 


 


Urine Urobilinogen    4.0 H














- EKG Interpretation by Me


Additional EKG results interpreted by me: 


EKG shows sinus tachycardia at a rate of 105, QTC of 503 which is prolonged, 

normal axis, no T wave inversions or ST segment changes in consecutive leads.





Discharge





- Discharge


Clinical Impression: 


 Dehydration





Vomiting


Qualifiers:


 Vomiting type: unspecified Vomiting Intractability: intractable Nausea prese

nce: with nausea Qualified Code(s): R11.2 - Nausea with vomiting, unspecified





Pneumonia


Qualifiers:


 Pneumonia type: due to unspecified organism Laterality: unspecified laterality 

Lung location: unspecified part of lung Qualified Code(s): J18.9 - Pneumonia, 

unspecified organism





Leukocytosis


Qualifiers:


 Leukocytosis type: unspecified Qualified Code(s): D72.829 - Elevated white 

blood cell count, unspecified





Abdominal pain


Qualifiers:


 Abdominal location: generalized Qualified Code(s): R10.84 - Generalized 

abdominal pain





Condition: Stable


Disposition: ADMITTED AS INPATIENT


Admitting Provider: Onwe (Hospitalist)


Unit Admitted: [facility]


Referrals: 


CHERRY JAVIER MD [ACTIVE STAFF] - Follow up as needed

## 2020-11-15 NOTE — EKG REPORT
SEVERITY:- ABNORMAL ECG -

SINUS TACHYCARDIA

PROLONGED QT INTERVAL

:

Confirmed by: Otoniel Munroe 15-Nov-2020 23:56:54

## 2020-11-15 NOTE — ER DOCUMENT REPORT
ED Medical Screen (RME)





- General


Chief Complaint: General Weakness


Stated Complaint: GENERAL WEAKNESS


Time Seen by Provider: 11/15/20 22:31


Primary Care Provider: 


CHERRY JAVIER MD [Primary Care Provider] - Follow up as needed


Mode of Arrival: Wheelchair


Information source: Patient


Notes: 





HPI; 50-year-old male recently diagnosed with pancreatic cancer presents to the 

emergency room complaining of nausea, vomiting, and worsening abdominal pain 

that started earlier today.  Has been taking his Zofran without relief.





PE:


Alert and oriented x3.  Moderate distress noted.  Lungs: Clear to auscultation 

without rales, rhonchi, wheezes.  Heart tachycardic without murmurs, rubs, 

gallops.





I have greeted and performed a rapid initial assessment of this patient.  A 

comprehensive ED assessment and evaluation of the patient, analysis of test 

results and completion of the medical decision making process will be conducted 

by additional ED providers.  I have specifically instructed the patient or 

family members with the patient to immediately return to any nursing staff 

should anything change in the patient's condition or with their chief complaint.


TRAVEL OUTSIDE OF THE [place] IN LAST 30 DAYS: No





- Related Data


Allergies/Adverse Reactions: 


                                        





No Known Allergies Allergy (Verified 11/05/20 08:58)


   











Past Medical History





- Past Medical History


Cardiac Medical History: 


   Denies: Hx Congestive Heart Failure, Hx Heart Attack, Hx Hypertension


Pulmonary Medical History: 


   Denies: Hx Asthma, Hx Bronchitis, Hx COPD, Hx Pneumonia, Hx Tuberculosis


Neurological Medical History: Denies: Hx Seizures, Hx Parkinson's Disease


Renal/ Medical History: Denies: Hx Benign Prostatic Hyperplasia, Hx End Stage 

Renal Disease, Hx Kidney Stones


Malignancy Medical History: Reports Hx Pancreatic Cancer


GI Medical History: Denies: Hx Cirrhosis, Hx Gastroesophageal Reflux Disease, Hx

Ulcer


Musculoskeltal Medical History: Denies Hx Arthritis, Denies Hx Multiple 

Sclerosis


Psychiatric Medical History: 


   Denies: Hx Bipolar Disorder, Hx Depression, Hx Schizophrenia


Past Surgical History: Reports: Other - Pigtail catheter from common bile duct 

to stomach.





Doctor's Discharge





- Discharge


Referrals: 


CHERRY JAVIER MD [Primary Care Provider] - Follow up as needed

## 2020-11-16 LAB
ANISOCYTOSIS BLD QL SMEAR: (no result)
APPEARANCE UR: CLEAR
APTT PPP: YELLOW S
BASE EXCESS BLDV CALC-SCNC: -2.2 MMOL/L
BILIRUB UR QL STRIP: NEGATIVE
GLUCOSE UR STRIP-MCNC: NEGATIVE MG/DL
HCO3 BLDV-SCNC: 22.4 MMOL/L (ref 20–32)
KETONES UR STRIP-MCNC: NEGATIVE MG/DL
NITRITE UR QL STRIP: NEGATIVE
PATH REV BLD -IMP: (no result)
PCO2 BLDV: 37.8 MMHG (ref 35–63)
PH BLDV: 7.39 [PH] (ref 7.3–7.42)
PH UR STRIP: 6 [PH] (ref 5–9)
PLATELET COMMENT: ADEQUATE
POIKILOCYTOSIS BLD QL SMEAR: SLIGHT
POLYCHROMASIA BLD QL SMEAR: (no result)
PROT UR STRIP-MCNC: NEGATIVE MG/DL
SP GR UR STRIP: 1.04
TARGETS BLD QL SMEAR: SLIGHT
UROBILINOGEN UR-MCNC: 4 MG/DL (ref ?–2)
WBC # BLD AUTO: 30.3 10^3/UL (ref 4–10.5)

## 2020-11-16 RX ADMIN — CEFEPIME HYDROCHLORIDE SCH MLS/HR: 1 INJECTION, SOLUTION INTRAVENOUS at 23:37

## 2020-11-16 RX ADMIN — PANTOPRAZOLE SODIUM SCH MG: 40 TABLET, DELAYED RELEASE ORAL at 07:55

## 2020-11-16 RX ADMIN — PROMETHAZINE HYDROCHLORIDE PRN MG: 25 INJECTION INTRAMUSCULAR; INTRAVENOUS at 15:56

## 2020-11-16 RX ADMIN — ALPRAZOLAM SCH MG: 0.5 TABLET ORAL at 11:34

## 2020-11-16 RX ADMIN — OXYCODONE HYDROCHLORIDE PRN MG: 5 TABLET ORAL at 11:33

## 2020-11-16 RX ADMIN — OXYCODONE HYDROCHLORIDE PRN MG: 5 TABLET ORAL at 20:16

## 2020-11-16 RX ADMIN — DOCUSATE SODIUM SCH MG: 100 CAPSULE, LIQUID FILLED ORAL at 11:34

## 2020-11-16 RX ADMIN — PROMETHAZINE HYDROCHLORIDE PRN MG: 25 INJECTION INTRAMUSCULAR; INTRAVENOUS at 07:55

## 2020-11-16 RX ADMIN — ALPRAZOLAM SCH MG: 0.5 TABLET ORAL at 23:36

## 2020-11-16 RX ADMIN — Medication SCH MLS/HR: at 17:56

## 2020-11-16 RX ADMIN — FLUOXETINE SCH MG: 20 CAPSULE ORAL at 11:33

## 2020-11-16 RX ADMIN — ENOXAPARIN SODIUM SCH MG: 60 INJECTION SUBCUTANEOUS at 23:37

## 2020-11-16 NOTE — RADIOLOGY REPORT (SQ)
EXAM DESCRIPTION: 



CT ABDOMEN PELVIS WITH IV CONTRAST



COMPLETED DATE/TME:  11/16/2020 00:46



CLINICAL HISTORY: 



50 years, Male, abdominal pain.  History of pancreatic cancer. 



COMPARISON:

November 5, 2020



TECHNIQUE:

Postcontrast images of the abdomen and pelvis were obtained with

66 mL Omnipaque 350 intravenously.  Images stored on PACS.

 

All CT scanners at this facility use dose modulation, iterative

reconstruction, and/or weight based dosing when appropriate to

reduce radiation dose to as low as reasonably achievable (ALARA).





[facility]: Dose Right [facility]: CareDose   [facility]: Dose Right    [facility]:

Teradose 4D    [facility]: Smart Technologies



LIMITATIONS:

None.



FINDINGS:



Initial images through the lung bases reveal new, tiny bilateral

pleural effusions and mild basilar atelectasis or infiltration. 

Heart size is within normal limits.  No definite pericardial

effusion.  There is a small hiatal hernia.



Liver is again noted to be enlarged.  There is extensive

metastatic disease again identified throughout the liver, largest

conglomerate mass in the medial left lobe measuring at least 8.2

cm.  7.5 cm pancreatic head mass is also similar to the prior

exam.  Biliary drain is again noted, extending into the region of

the stomach as before.  There is pneumobilia again identified. 

There is stable mild splenomegaly.  The kidneys and adrenal

glands are within normal limits.  Gallbladder is nondilated and

there is gas within the gallbladder lumen as before, presumably

from the biliary drain.



There is no abnormal bowel dilation or free air.  There is a mild

amount of free fluid within the abdomen and pelvis, increased

from the prior study.  The appendix is difficult to identify as

an independent structure, however I see no secondary sign of

acute appendicitis.



Incidental arterial calcifications are noted.  There is no

abdominal aortic aneurysm.  There is degenerative change about

the lower lumbar spine.  There is no acute or suspicious bony

abnormality.





IMPRESSION:



1.  Stable pancreatic head mass and multiple hepatic metastases. 

Biliary drain and pneumobilia are also stable from the prior CT.



2.  Mild free fluid within the abdomen and pelvis, increased from

the prior study.



3.  Tiny bilateral pleural effusions and mild basilar atelectasis

or infiltration.





 

TECHNICAL DOCUMENTATION:



Quality ID # 436: Final reports with documentation of one or more

dose reduction techniques (e.g., Automated exposure control,

adjustment of the mA and/or kV according to patient size, use of

iterative reconstruction technique)



copyright 2011 [employer]- All Rights Reserved

## 2020-11-16 NOTE — PDOC CONSULTATION
Consultation


Consult Date: 11/16/20


Attending physician:: ALEXANDER CEE


Provider Consulted: CHERRY JAVIER


Consult reason:: Pt w/ stage IV pancreatic ca here w/ weakness, N/V and 

hypotension





History of Present Illness


Admission Date/PCP: 


  11/16/20 06:37





  





Patient complains of: Weakness, N/V


History of Present Illness: 


ELON WARNER is a 50 year old male w/ known h/o of stage IV pancreatic ca w/ 

liver mets, has had esophagoduodenostomy for biliary conduit, bili had improved 

but pt w/ severe N/V, dehydration, we saw him last week with IVF but worsened 

over weekend. He had port placed and was planned to start FOLFOX chemo tomorrow.

Feeling a little better this am. But LUE swollen and painful.








Past Medical History


Cardiac Medical History: 


   Denies: Congestive Heart Failure, Myocardial Infarction, Hypertension


Pulmonary Medical History: 


   Denies: Asthma, Bronchitis, Chronic Obstructive Pulmonary Disease (COPD), 

Pneumonia, Tuberculosis


Neurological Medical History: 


   Denies: Seizures


Renal/ Medical History: 


   Denies: End Stage Renal Disease


Malignancy Medical History: Reports: Pancreatic Cancer - With metastasis to the 

liver


GI Medical History: 


   Denies: Cirrhosis, Gastroesophageal Reflux Disease


Musculoskeltal Medical History: 


   Denies: Arthritis


Psychiatric Medical History: 


   Denies: Bipolar Disorder, Depression


Hematology: 


   Denies: Anemia, Bleeding Tendencies





Past Surgical History


Past Surgical History: Reports: Other - Pigtail catheter from common bile duct 

to stomach.





Social History


Lives with: Family


Smoking Status: Never Smoker


Electronic Cigarette use?: No


Frequency of Alcohol Use: None


Hx Recreational Drug Use: No


Drugs: None


Hx Prescription Drug Abuse: No





- Advance Directive


Resuscitation Status: Full Code





Family History


Family History: Reviewed & Not Pertinent, Other - Significant for pancreatic 

cancer in grandmother.  denies: CAD


Parental Family History Reviewed: Yes


Children Family History Reviewed: Yes


Sibling(s) Family History Reviewed.: Yes





Medication/Allergy


Home Medications: 








Alprazolam [Xanax 0.5 mg Tablet] 0.5 mg PO DAILY 11/05/20 


Fluoxetine HCl [Prozac] 40 mg PO DAILY 11/05/20 


Ondansetron [Zofran Odt 4 mg Tablet] 4 mg PO Q8HP PRN 11/05/20 


Oxycodone HCl [Oxy-Ir 5 mg Tablet] 5 mg PO Q6HP PRN 11/05/20 


Pantoprazole Sodium [Protonix 40 mg Dr Tablet] 40 mg PO DAILY 11/05/20 


Sucralfate [Carafate 1 gm Tablet] 1 gm PO ACHS 11/05/20 


Acetaminophen [Tylenol 325 mg Tablet] 650 mg PO Q4HP PRN  tablet 11/07/20 








Allergies/Adverse Reactions: 


                                        





No Known Allergies Allergy (Verified 11/16/20 07:44)


   











Review of Systems


Constitutional: ABSENT: chills, fever(s), headache(s), weight gain, weight loss


Eyes: ABSENT: visual disturbances


Ears: ABSENT: hearing changes


Cardiovascular: ABSENT: chest pain, dyspnea on exertion, edema, orthropnea, 

palpitations


Respiratory: ABSENT: cough, hemoptysis


Gastrointestinal: ABSENT: abdominal pain, constipation, diarrhea, hematemesis, 

hematochezia, nausea, vomiting


Genitourinary: ABSENT: dysuria, hematuria


Musculoskeletal: ABSENT: joint swelling


Integumentary: ABSENT: rash, wounds


Neurological: ABSENT: abnormal gait, abnormal speech, confusion, dizziness, 

focal weakness, syncope


Psychiatric: ABSENT: anxiety, depression, homidical ideation, suicidal ideation


Endocrine: ABSENT: cold intolerance, heat intolerance, polydipsia, polyuria


Hematologic/Lymphatic: ABSENT: easy bleeding, easy bruising





Physical Exam


Vital Signs: 


                                        











Temp Pulse Resp BP Pulse Ox


 


 97.5 F   132 H  37 H  101/61   90 L


 


 11/16/20 01:39  11/15/20 22:36  11/16/20 07:30  11/16/20 07:30  11/16/20 07:30








                                 Intake & Output











 11/15/20 11/16/20 11/17/20





 06:59 06:59 06:59


 


Intake Total  2000 


 


Balance  2000 


 


Weight  58.06 kg 











General appearance: PRESENT: no acute distress, well-developed, well-nourished


Head exam: PRESENT: atraumatic, normocephalic


Eye exam: PRESENT: conjunctiva pink, EOMI, PERRLA.  ABSENT: scleral icterus


Ear exam: PRESENT: normal external ear exam


Mouth exam: PRESENT: moist, tongue midline


Neck exam: ABSENT: carotid bruit, JVD, lymphadenopathy, thyromegaly


Respiratory exam: PRESENT: clear to auscultation destiny.  ABSENT: rales, rhonchi, 

wheezes


Cardiovascular exam: PRESENT: RRR.  ABSENT: diastolic murmur, rubs, systolic 

murmur


Pulses: PRESENT: normal dorsalis pedis pul


Vascular exam: PRESENT: normal capillary refill


GI/Abdominal exam: PRESENT: normal bowel sounds, soft.  ABSENT: distended, 

guarding, mass, organolmegaly, rebound, tenderness


Rectal exam: PRESENT: deferred


Extremities exam: PRESENT: full ROM.  ABSENT: calf tenderness, clubbing, pedal 

edema


Neurological exam: PRESENT: alert, awake, oriented to person, oriented to place,

oriented to time, oriented to situation, CN II-XII grossly intact.  ABSENT: 

motor sensory deficit


Psychiatric exam: PRESENT: appropriate affect, normal mood.  ABSENT: homicidal 

ideation, suicidal ideation


Skin exam: PRESENT: dry, intact, warm.  ABSENT: cyanosis, rash





Results


Laboratory Results: 


                                        





                                 11/15/20 22:52 





                                 11/15/20 22:52 





                                        











  11/15/20 11/15/20 11/16/20





  22:52 22:52 02:31


 


WBC  30.3 H*  


 


RBC  3.53 L  


 


Hgb  9.6 L  


 


Hct  29.7 L  


 


MCV  84  


 


MCH  27.3  


 


MCHC  32.4  


 


RDW  16.9 H  


 


Plt Count  426  


 


Seg Neutrophils %  Not Reportable  


 


VBG pH   


 


VBG pCO2   


 


VBG HCO3   


 


VBG Base Excess   


 


Sodium   134.3 L 


 


Potassium   3.6 


 


Chloride   101 


 


Carbon Dioxide   21 L 


 


Anion Gap   12 


 


BUN   23 H 


 


Creatinine   1.26 H 


 


Est GFR ( Amer)   > 60 


 


Glucose   126 H 


 


Lactic Acid    0.8


 


Calcium   8.9 


 


Magnesium   2.2 


 


Total Bilirubin   1.6 H 


 


AST   31 


 


Alkaline Phosphatase   473 H 


 


Total Protein   6.6 


 


Albumin   2.8 L 


 


Lipase   201.4 


 


Urine Color   


 


Urine Appearance   


 


Urine pH   


 


Ur Specific Gravity   


 


Urine Protein   


 


Urine Glucose (UA)   


 


Urine Ketones   


 


Urine Blood   


 


Urine Nitrite   


 


Ur Leukocyte Esterase   


 


Urine WBC (Auto)   


 


Urine RBC (Auto)   














  11/16/20 11/16/20





  02:35 03:25


 


WBC  


 


RBC  


 


Hgb  


 


Hct  


 


MCV  


 


MCH  


 


MCHC  


 


RDW  


 


Plt Count  


 


Seg Neutrophils %  


 


VBG pH   7.39


 


VBG pCO2   37.8


 


VBG HCO3   22.4


 


VBG Base Excess   -2.2


 


Sodium  


 


Potassium  


 


Chloride  


 


Carbon Dioxide  


 


Anion Gap  


 


BUN  


 


Creatinine  


 


Est GFR (African Amer)  


 


Glucose  


 


Lactic Acid  


 


Calcium  


 


Magnesium  


 


Total Bilirubin  


 


AST  


 


Alkaline Phosphatase  


 


Total Protein  


 


Albumin  


 


Lipase  


 


Urine Color  YELLOW 


 


Urine Appearance  CLEAR 


 


Urine pH  6.0 


 


Ur Specific Gravity  1.039 


 


Urine Protein  NEGATIVE 


 


Urine Glucose (UA)  NEGATIVE 


 


Urine Ketones  NEGATIVE 


 


Urine Blood  NEGATIVE 


 


Urine Nitrite  NEGATIVE 


 


Ur Leukocyte Esterase  NEGATIVE 


 


Urine WBC (Auto)  4 


 


Urine RBC (Auto)  1 








                                        











  11/15/20





  22:52


 


Troponin I  < 0.012











Impressions: 


                                        





Abdomen/Pelvis CT  11/15/20 22:35


IMPRESSION:


 


1.  Stable pancreatic head mass and multiple hepatic metastases. 


Biliary drain and pneumobilia are also stable from the prior CT.


 


2.  Mild free fluid within the abdomen and pelvis, increased from


the prior study.


 


3.  Tiny bilateral pleural effusions and mild basilar atelectasis


or infiltration.


 


 


 


TECHNICAL DOCUMENTATION:


 


Quality ID # 436: Final reports with documentation of one or more


dose reduction techniques (e.g., Automated exposure control,


adjustment of the mA and/or kV according to patient size, use of


iterative reconstruction technique)


 


copyright 2011 [employer]- All Rights Reserved


 














Assessment & Plan





- Diagnosis


(1) Nausea & vomiting


Qualifiers: 


   Vomiting type: unspecified   Vomiting Intractability: intractable   Qualified

Code(s): R11.2 - Nausea with vomiting, unspecified   


Is this a current diagnosis for this admission?: Yes   


Plan: 


2nd to panc ca, cont IVF and antiemetics








(2) Pancreatic cancer metastasized to liver


Is this a current diagnosis for this admission?: Yes   


Plan: 


Would be reasonable if pt looks better tomorrow to start chemo. Will make 

arrangements to start chemo while admitted.








- Time


Time Spent: Greater than 70 Minutes

## 2020-11-16 NOTE — XMS REPORT
Patient Summary Document

                          Created on:2020



Patient:LEON HAGAN

Sex:Male

:1970

External Reference #:[id number]





Demographics







                          Address                   [address]



                                                    



                                                    [place] [address]

 

                          Home Phone                [phone]

 

                          Email Address             [email or web address]

 

                          Preferred Language        en

 

                          Marital Status            Unknown

 

                          Pentecostalism Affiliation     Unknown

 

                          Race                      Unknown

 

                          Additional Race(s)        Other



                                                    6-3

 

                          Ethnic Group              Unknown









Author







                          Organization              [facility]

 

                          Address                   [facility] [address]



                                                    [place] [address]

 

                          Phone                     [phone]









Care Team Providers







                    Name                Role                Phone

 

                    KAROLINE LOCKE Attending Clinician Unavailable

 

                    VALERIE VALDEZ     Attending Clinician Unavailable

 

                    GISSEL             Attending Clinician Unavailable

 

                    Leon BARFIELD        Attending Clinician Unavailable

 

                    TAMIE LOCKE       Admitting Clinician Unavailable









Allergies, Adverse Reactions, Alerts

This patient has no known allergies or adverse reactions.



Medications







       Ordered Filled Start  Stop   Current Ordering Indication Dosage Frequency

 Signature

                          Comments                  Components



      Medication Medication Date  Date  Medication? Clinician                   

(SIG)       



      Name  Name                                                        

 

      5-FU PUMP       -       Yes                                       



                                                                    



                  00:00:                                                 



                  00                                                    

 

      Atropine       -       Yes                                       



      Sulfate                                                         



                  00:00:                                                 



                  00                                                    

 

      Dexamethaso       -       Yes                                       



      ne Sodium       -16                                                  



      Phosphate       00:00:                                                 



                  00                                                    

 

      Dextrose       -       Yes                                       



                                                                    



                  00:00:                                                 



                  00                                                    

 

      Famotidine       -       Yes                                       



                                                                    



                  00:00:                                                 



                  00                                                    

 

      Fluorouraci       -       Yes                                       



      l           16                                                  



                  00:00:                                                 



                  00                                                    

 

      Irinotecan       -       Yes                                       



      HCl         16                                                  



                  00:00:                                                 



                  00                                                    

 

      Leucovorin       -       Yes                                       



      Calcium                                                         



                  00:00:                                                 



                  00                                                    

 

      Ondansetron       -       Yes                                       



      HCl         -16                                                  



                  00:00:                                                 



                  00                                                    

 

      Oxaliplatin       -1       Yes                                       



                  16                                                  



                  00:00:                                                 



                  00                                                    

 

      Heparin       -       Yes                                       



      Sodium Lock       -12                                                  



      Flush       00:00:                                                 



                  00                                                    

 

      Hydration       -       Yes                                       



      1000mL NS       -                                                  



                  00:00:                                                 



                  00                                                    

 

      Phenergan       -1       Yes               1                       



                  -                                                  



                  00:00:                                                 



                  00                                                    

 

      Zofran       -1       Yes               1                       



                  -                                                  



                  00:00:                                                 



                  00                                                    

 

      ALPRAZolam       -1       Yes               1                       



                  -02                                                  



                  00:00:                                                 



                  00                                                    

 

      Ondansetron       -1       Yes               1                       



                  -                                                  



                  00:00:                                                 



                  00                                                    

 

      oxyCODONE       -1       No                1                       



      HCl         1-                                                  



                  00:00:                                                 



                  00                                                    

 

      oxyCODONE       -1       Yes               1                       



      HCl         1-02                                                  



                  00:00:                                                 



                  00                                                    

 

      pantoprazol       2020-1 2020- Yes               40mg        Take 1 Take 1

 



      e           0-16  11-15                               tablet (40 tablet 



      (PROTONIX)       00:00: 23:59                               mg total) (40 

mg 



      40 MG       00    :00                                 by mouth total) by 



      tablet                                                 daily. mouth 



                                                                  daily. 

 

      ondansetron       2020- No                4mg         4 mg,       



      (ZOFRAN-ODT       0-15  10-15                               Oral,       



      )           17:00: 16:53                               Once, Thu       



      disintegrat       00    :00                                 10/15/20      

 



      ing tablet                                                 at 1700,       



      4 mg                                                  For 1       



                                                            dose<br>Ro       



                                                            utine       

 

      amoxicillin       2020- No          intra-abdom 875mg       1 table

t       



      -clavulanat       0-15  10-20             inal              (875 mg),     

  



      e           10:00: 08:59                               Oral, 2       



      (AUGMENTIN)       00    :00                                 times a       



      875-125 mg                                                 day         



      per tablet                                                 (standard)     

  



      1 tablet                                                 , First       



                                                            dose on       



                                                            u         



                                                            10/15/20       



                                                            at 1000,       



                                                            For 5       



                                                            days<br>Gi       



                                                            ve With       



                                                            Food<br>Ro       



                                                            utine,       



                                                            Indication       



                                                            s:          



                                                            intra-abdo       



                                                            evans       

 

      pantoprazol       2020       No                40mg        40 mg,       



      e           0-15                                      Oral,       



      (PROTONIX)       09:00:                                     Daily       



      EC tablet       00                                        (standard)      

 



      40 mg                                                 , First       



                                                            dose on       



                                                            u         



                                                            10/15/20       



                                                            at          



                                                            0900<br>Ro       



                                                            utine       

 

      sucralfate       2020       No                1g          1 g, Oral,    

   



      (CARAFATE)       0-15                                      4 times a      

 



      tablet 1 g       07:30:                                     day         



                  00                                        (ACHS),       



                                                            First dose       



                                                            on Thu       



                                                            10/15/20       



                                                            at          



                                                            0730<br>Ro       



                                                            utine       

 

      FLUoxetine       2020- Yes               40mg        Take 1 Take 1 



      (PROZAC) 40       0-15  12-14                               capsule capsul

e 



      MG capsule       00:00: 23:59                               (40 mg (40 mg 



                  00    :00                                 total) by total) by 



                                                            mouth mouth 



                                                            daily. daily. 

 

      sucralfate       2020- Yes               1g          Take 1 Take 1 



      (CARAFATE)       0-15  11-14                               tablet (1 table

t (1 



      1 gram       00:00: 23:59                               g total) g total) 



      tablet       00    :00                                 by mouth by mouth 



                                                            Four (4) Four (4) 



                                                            times a times a 



                                                            day   day   



                                                            (before (before 



                                                            meals and meals and 



                                                            nightly). nightly). 

 

      ondansetron       2020- Yes               4mg         Take 1 Take 1

 



      (ZOFRAN-ODT       0-15  10-29                               tablet (4 tabl

et (4 



      ) 4 MG       00:00: 23:59                               mg total) mg total

) 



      disintegrat       00    :00                                 by mouth by mo

uth 



      ing tablet                                                 every every 



                                                            eight (8) eight (8) 



                                                            hours as hours as 



                                                            needed for needed 



                                                            nausea for for   



                                                            up to 14 nausea 



                                                            days. for up to 



                                                                  14 days. 

 

      polyethylen       2020- Yes               17g         Mix 1 Mix 1 



      e glycol       0-15  10-25                               packet (17 packet

 



      (MIRALAX)       00:00: 23:59                               g) into 8 (17 g

) 



      17 gram       00    :00                                 ounces of into 8 



      packet                                                 liquid and ounces o

f 



                                                            drink by liquid 



                                                            mouth and drink 



                                                            daily for by mouth 



                                                            10 days. daily for 



                                                                  10 days. 

 

      amoxicillin       2020- Yes         intra-abdom 1{tbl}       Take 1

 Take 1 



      -clavulanat       0-15  10-20             inal              tablet by tabl

et by 



      e           00:00: 23:59                               mouth Two mouth Two

 



      (AUGMENTIN)       00    :00                                 (2) times (2) 

times 



      875-125 mg                                                 a day for a day

 for 



      per tablet                                                 5 days. 5 days.

 

 

      oxyCODONE       2020- Yes               5mg         Take 1 Take 1 



      (ROXICODONE       0-15  10-20                               tablet (5 tabl

et (5 



      ) 5 MG       00:00: 23:59                               mg total) mg total

) 



      immediate       00    :00                                 by mouth by mout

h 



      release                                                 every four every 



      tablet                                                 (4) hours four (4) 



                                                            as needed hours as 



                                                            for pain needed 



                                                            for up to for pain 



                                                            5 days. for up to 



                                                                  5 days. 

 

      ondansetron       2020       No                4mg         4 mg,       



      (ZOFRAN)       0-14                                      Intravenou       



      injection 4       22:37:                                     s, Every 6   

    



      mg          19                                        hours PRN,       



                                                            nausea,       



                                                            vomiting,       



                                                            Starting       



                                                            Wed         



                                                            10/14/20       



                                                            at          



                                                            2237<br>Ro       



                                                            utine       

 

      calcium       2020- No                            200 mg       



      carbonate       0-14  10-14                               Algaaciq        



      (TUMS)       20:00: 20:50                               calcium,       



      chewable       00    :00                                 Oral,       



      tablet 200                                                 Once, Wed      

 



      mg Algaaciq                                                 10/14/20       



      calcium                                                 at 2000,       



                                                            For 1       



                                                            dose<br>50       



                                                            0 mg        



                                                            calcium       



                                                            carbonate       



                                                            = 200 mg       



                                                            elemental       



                                                            calcium =       



                                                            1           



                                                            tablet<br>       



                                                            Routine       

 

      potassium       2020- No                21mmol       21 mmol,      

 



      phosphate       0-14  10-14                               Intravenou      

 



      21 mmol in       06:00: 10:14                               s,          



      sodium       00    :00                                 Administer       



      chloride                                                 over 240       



      (NS) 0.9 %                                                 Minutes,       



      250 mL                                                 Once, Wed       



      infusion                                                 10/14/20       



                                                            at 0600,       



                                                            For 1       



                                                            dose<br>do       



                                                            not         



                                                            refrigerat       



                                                            e<br>Routi       



                                                            ne          

 

      sodium       2020- No                1000mL       1,000 mL,       



      chloride       0-13  10-13                               Intravenou       



      0.9% (NS)       22:00: 23:37                               s,          



      bolus 1,000       00    :00                                 Administer    

   



      mL                                                    over 2       



                                                            Hours,       



                                                            Once, e       



                                                            10/13/20       



                                                            at 2200,       



                                                            For 1       



                                                            dose<br>Ro       



                                                            utine       

 

      pantoprazol       2020 No                40mg        40 mg,       



      e           0-13  10-14                               Intravenou       



      (PROTONIX)       20:00: 19:23                               s, Daily      

 



      injection       00    :40                                 (standard)      

 



      40 mg                                                 , First       



                                                            dose on       



                                                            Tue         



                                                            10/13/20       



                                                            at          



                                                            2000<br>Ro       



                                                            utine       

 

      heparin       2020       No                5000U       5,000       



      (porcine)       0-                                      Units,       



      injection       18:00:                                     Subcutaneo     

  



      5,000 Units       00                                        us, Every     

  



                                                            8 hours       



                                                            scheduled,       



                                                            First dose       



                                                            on Tue       



                                                            10/13/20       



                                                            at          



                                                            1800<br>Fo       



                                                            r           



                                                            questionab       



                                                            le          



                                                            bleeding,       



                                                            contact       



                                                            the         



                                                            provider       



                                                            for         



                                                            further       



                                                            instructio       



                                                            ns prior       



                                                            to          



                                                            administra       



                                                            tion.&nbsp       



                                                            ;HIGH       



                                                            ALERT       



                                                            MEDICATION       



                                                            <br>Routin       



                                                            e           

 

      levofloxaci       2020 No          intra-abdom 500mg       500 mg,

       



      n           0-13  10-13             inal              [facility]       



      (LEVAQUIN)       16:00: 16:27                               s, at 100     

  



      500 mg/100       00    :00                                 mL/hr,       



      mL IVPB 500                                                 Once, Tue     

  



      mg                                                    10/13/20       



                                                            at 1600,       



                                                            For 1       



                                                            dose<br>Ro       



                                                            utine,       



                                                            Indication       



                                                            s:          



                                                            intra-abdo       



                                                            evans, s/p       



                                                            hepaticoes       



                                                            ophagostom       



                                                            y           

 

      fentaNYL       2020- No                25ug        25 mcg,       



      (PF)        0-13  10-13                               Intraven[facility]       



      (SUBLIMAZE)       14:59: 16:31                               s, Every 5   

    



      injection       14    :43                                 min PRN,       



      25 mcg                                                 other,       



                                                            pain,       



                                                            moderate       



                                                            (4-6);       



                                                            pain,       



                                                            severe       



                                                            (7-10),       



                                                            Starting       



                                                            Tue         



                                                            10/13/20       



                                                            at 1459,       



                                                            For 6       



                                                            hours,       



                                                            PACU        



                                                            (only)<br>       



                                                            Maximum       



                                                            dose 100       



                                                            mcg.<br>Ro       



                                                            utine       

 

      heparin       2020- No                5000U       5,000       



      (porcine)       0-12  10-12                               Units,       



      injection       22:00: 22:15                               Subcutaneo     

  



      5,000 Units       00    :00                                 us, Every     

  



                                                            8 hours       



                                                            scheduled,       



                                                            First dose       



                                                            on Mon       



                                                            10/12/20       



                                                            at 2200,       



                                                            For 1       



                                                            dose&lt;br       



                                                            >For        



                                                            questionab       



                                                            le          



                                                            bleeding,       



                                                            contact       



                                                            the         



                                                            provider       



                                                            for         



                                                            further       



                                                            instructio       



                                                            ns prior       



                                                            to          



                                                            administra       



                                                            tion.&nbsp       



                                                            ;HIGH       



                                                            ALERT       



                                                            MEDICATION       



                                                            <br>Routin       



                                                            e           

 

      FLUoxetine       2020       No                40mg        40 mg,       



      (PROzac)       0-12                                      Oral,       



      capsule 40       12:00:                                     Daily       



      mg          00                                        (standard)       



                                                            , First       



                                                            dose on       



                                                            Mon         



                                                            10/12/20       



                                                            at          



                                                            1200<br>Ro       



                                                            utine       

 

      heparin       2020- No                5000U       5,000       



      (porcine)       0-10  10-11                               Units,       



      injection       14:00: 23:23                               Subcutaneo     

  



      5,000 Units       00    :00                                 us, Every     

  



                                                            8 hours       



                                                            scheduled,       



                                                            First dose       



                                                            on Sat       



                                                            10/10/20       



                                                            at 1400,       



                                                            For 5       



                                                            doses&lt;b       



                                                            r>For       



                                                            questionab       



                                                            le          



                                                            bleeding,       



                                                            contact       



                                                            the         



                                                            provider       



                                                            for         



                                                            further       



                                                            instructio       



                                                            ns prior       



                                                            to          



                                                            administra       



                                                            tion.&nbsp       



                                                            ;HIGH       



                                                            ALERT       



                                                            MEDICATION       



                                                            <br>Routin       



                                                            e           

 

      magnesium       2020 No                2g          2 g,        



      sulfate       0-10  10-10                               [facility]ra[facility]       



      2gm/50mL       07:00: 08:52                               s, at 25       



      IVPB        00    :00                                 mL/hr,       



                                                            Once, Sat       



                                                            10/10/20       



                                                            at 0700,       



                                                            For 1       



                                                            dose<br>Ro       



                                                            utine       

 

      promethazin       2020       No                12.5mg       12.5 mg,    

   



      e           0-09                                      [facility]ra[facility]       



      (PHENERGAN)       20:23:                                     s, Every 6   

    



      12.5 mg in       55                                        hours PRN,     

  



      sodium                                                 nausea,       



      chloride                                                 vomiting,       



      (NS) 0.9 %                                                 Starting       



      25 mL                                                 Fri         



      infusion                                                 10/9/20 at       



                                                            <br>Ro       



                                                            utine       

 

      dextrose 5       2020- No          Pancreatic 50mL/h       50 mL/hr

,       



      % and       0-09  10-14             mass              [facility]ra[facility]       



      sodium       20:00: 13:37                               s,          



      chloride       00    :45                                 Continuous       



      0.45 % with                                                 , Starting    

   



      KCl 20                                                 Fri         



      mEq/L                                                 10/9/20 at       



      infusion                                                 2000<br>pl       



                                                            ease        



                                                            reorder 2       



                                                            hours       



                                                            prior to       



                                                            needing       



                                                            new         



                                                            dose<br>Ro       



                                                            utine       

 

      lactated       2020- No                10mL/h       10 mL/hr,      

 



      Ringers       0-09  10-14                               [facility]ra[facility]       



      infusion       16:00: 11:47                               s,          



                  00    :37                                 Continuous       



                                                            , Starting       



                                                            Fri         



                                                            10/9/20 at       



                                                            1600,       



                                                            Pre-op       



                                                            (day of       



                                                            surgery)<b       



                                                            r>KVO<br>R       



                                                            outine       

 

      piperacilli       2020- No          intra-abdom 3.375g       3.375 

g,       



      n-tazobacta       0-09  10-12             inal              [facility]ra[facility]    

   



      m (ZOSYN)       14:00: 21:27                               s, at 100      

 



      IVPB        00    :00                                 mL/hr,       



      (premix)                                                 Every 6       



      3.375 g                                                 hours,       



                                                            First dose       



                                                            on Fri       



                                                            10/9/20 at       



                                                            1400, For       



                                                            14          



                                                            doses&lt;b       



                                                            r>Routine,       



                                                            Indication       



                                                            s:          



                                                            intra-abdo       



                                                            evans       

 

      sodium       2020 No                30mmol       30 mmol,       



      phosphate       0-09  10-09                               [facility]ra[facility]      

 



      30 mmol in       07:00: 12:44                               s, at 47.5    

   



      dextrose 5       00    :00                                 mL/hr,       



      % 250 mL                                                 Once, Fri       



      IVPB                                                  10/9/20 at       



                                                            0700, For       



                                                            1           



                                                            dose<br>Ro       



                                                            utine       

 

      dextrose 5       2020- No                100mL/h       100 mL/hr,  

     



      % and       0-09  10-09                               [facility]ra[facility]       



      sodium       00:00: 19:45                               s,          



      chloride       00    :18                                 Continuous       



      0.45 % with                                                 , Starting    

   



      KCl 20                                                 Fri         



      mEq/L                                                 10/9/20 at       



      infusion                                                 0000<br>pl       



                                                            ease        



                                                            reorder 2       



                                                            hours       



                                                            prior to       



                                                            needing       



                                                            new         



                                                            dose<br>Ro       



                                                            utine       

 

      potassium       2020 No                40meq       40 mEq,       



      chloride       0-08  10-                               Oral,       



      (KLOR-CON)       17:00: 18:00                               Once, Thu     

  



      CR tablet       00    :00                                 10/8/20 at      

 



      40 mEq                                                 1700, For       



                                                            1           



                                                            dose<br>Ro       



                                                            utine       

 

      loratadine       2020       No                10mg        10 mg,       



      (CLARITIN)       0-08                                      Oral,       



      tablet 10       15:00:                                     Daily       



      mg          00                                        (standard)       



                                                            , First       



                                                            dose on       



                                                            Thu         



                                                            10/8/20 at       



                                                            1500<br>Ro       



                                                            utine       

 

      magnesium       2020 No                400mg       400 mg,       



      oxide       0-08  10-08                               Oral,       



      (MAG-OX)       07:00: 08:00                               Once, Thu       



      tablet 400       00    :00                                 10/8/20 at     

  



      mg                                                    0700, For       



                                                            1           



                                                            dose<br>Ro       



                                                            utine       

 

      heparin       2020- No                5000U       5,000       



      (porcine)       0-08  10-09                               Units,       



      injection       06:00: 06:10                               Subcutaneo     

  



      5,000 Units       00    :05                                 us, Every     

  



                                                            8 hours       



                                                            scheduled,       



                                                            First dose       



                                                            on Thu       



                                                            10/8/20 at       



                                                            0600<br>Fo       



                                                            r           



                                                            questionab       



                                                            le          



                                                            bleeding,       



                                                            contact       



                                                            the         



                                                            provider       



                                                            for         



                                                            further       



                                                            instructio       



                                                            ns prior       



                                                            to          



                                                            administra       



                                                            tion.&nbsp       



                                                            ;HIGH       



                                                            ALERT       



                                                            MEDICATION       



                                                            <br>Routin       



                                                            e           

 

      potassium       2020- No                10meq       10 mEq,       



      chloride 10       0-08  10-08                               [facility]ra[facility]    

   



      mEq in 100       06:00: 10:00                               s,          



      mL IVPB       00    :00                                 Administer       



                                                            over 60       



                                                            Minutes,       



                                                            Every 1       



                                                            hour,       



                                                            First dose       



                                                            on Thu       



                                                            10/8/20 at       



                                                            0600, For       



                                                            4           



                                                            doses<br>T       



                                                            otal dose       



                                                            = 40 mEq;       



                                                            If          



                                                            Potassium       



                                                            >/= 3.5       



                                                            infuse at       



                                                            10          



                                                            mEq/hour&n       



                                                            bsp;If       



                                                            Potassium       



                                                            < 3.5 may       



                                                            infuse at       



                                                            20          



                                                            mEq/hour&n       



                                                            bsp;(Perip       



                                                            heral       



                                                            Line)&nbsp       



                                                            ;HIGH       



                                                            ALERT       



                                                            MEDICATION       



                                                            for         



                                                            patients <       



                                                            16 years       



                                                            of          



                                                            age<br>Rou       



                                                            chris        

 

      potassium       2020- No                40meq       40 mEq,       



      chloride       0-08  10-08                               Oral,       



      (KLOR-CON)       06:00: 05:32                               Once, Thu     

  



      CR tablet       00    :00                                 10/8/20 at      

 



      40 mEq                                                 0600, For       



                                                            1           



                                                            dose<br>Ro       



                                                            utine       

 

      ondansetron       2020- No                4mg         4 mg,       



      (ZOFRAN-ODT       0-08  10-14                               Oral,       



      )           04:37: 22:37                               Every 8       



      disintegrat       08    :25                                 hours PRN,    

   



      ing tablet                                                 nausea,       



      4 mg                                                  vomiting,       



                                                            Starting       



                                                            Thu         



                                                            10/8/20 at       



                                                            0437<br>Ro       



                                                            utine       

 

      acetaminoph       2020       No                500mg       500 mg,      

 



      en          0-08                                      Oral,       



      (TYLENOL)       04:36:                                     Every 8       



      tablet 500       15                                        hours PRN,     

  



      mg                                                    pain,mild       



                                                            (1-3),       



                                                            Starting       



                                                            Thu         



                                                            10/8/20 at       



                                                            0436<br>AD       



                                                            ULT MAX:       



                                                            NOT TO       



                                                            EXCEED 4GM       



                                                            ACETAMINOP       



                                                            HEN IN       



                                                            24HRS<br>R       



                                                            outine       

 

      oxyCODONE       2020- No                5mg         [Order 1       



      (ROXICODONE       0-08  10-22                               Start]       



      ) immediate       04:35: 04:34                               Name:       



      release       44    :44                                 oxyCODONE       



      tablet 5 mg                                                 (ROXICODON    

   



                                                            E)          



                                                            immediate       



                                                            release       



                                                            tablet 5       



                                                            mg Signed       



                                                            Summary: 5       



                                                            mg, Oral,       



                                                            Every 4       



                                                            hours PRN,       



                                                            pain,moder       



                                                            ate (4-6),       



                                                            Starting       



                                                            Thu         



                                                            10/8/20 at       



                                                            0435, For       



                                                            14          



                                                            days<br>Ro       



                                                            utine       



                                                            [Order 1       



                                                            End]        



                                                            [Order 2       



                                                            Start]       



                                                            Name:       



                                                            oxyCODONE       



                                                            (ROXICODON       



                                                            E)          



                                                            immediate       



                                                            release       



                                                            tablet 10       



                                                            mg Signed       



                                                            Summary:       



                                                            10 mg,       



                                                            Oral,       



                                                            Every 4       



                                                            hours PRN,       



                                                            pain,sever       



                                                            e (7-10),       



                                                            Starting       



                                                            Thu         



                                                            10/8/20 at       



                                                            0435, For       



                                                            14          



                                                            days&lt;br       



                                                            >May give       



                                                            if 5 mg is       



                                                            ineffectiv       



                                                            e.<br>Rout       



                                                            ine [Order       



                                                            2 End]       

 

      dextrose 5       2020                100mL/h       100 mL/hr,  

     



      % and       0-08  10-08                               Intravenou       



      sodium       04:00: 17:00                               s,          



      chloride       00    :37                                 Continuous       



      0.45 % with                                                 , Starting    

   



      KCl 20                                                 Thu         



      mEq/L                                                 10/8/20 at       



      infusion                                                 0400<br>pl       



                                                            ease        



                                                            reorder 2       



                                                            hours       



                                                            prior to       



                                                            needing       



                                                            new         



                                                            dose<br>Ro       



                                                            utine       

 

      influenza       2020       No                .5mL        0.5 mL,       



      vaccine       0-08                                      Intramuscu       



      quad        02:54:                                     lar,        



      (FLUARIX,       27                                        During       



      FLULAVAL,                                                 hospitaliz      

 



      FLUZONE) (6                                                 ation,       



      MOS & UP)                                                 Starting       



      -                                                 Thu         



                                                            10/8/20 at       



                                                            0254, For       



                                                            1           



                                                            dose<br>SH       



                                                            BROOKS WELL;       



                                                            May be       



                                                            used in       



                                                            egg         



                                                            allergic       



                                                            patients.       



                                                            Latex       



                                                            free.       



                                                            Preservati       



                                                            ve          



                                                            free.<br>R       



                                                            outine       

 

      aspirin 325                   Yes               325mg       Take 325 Take 

325 



      MG tablet                                                 mg by mg by 



                                                            mouth mouth 



                                                            every four every 



                                                            (4) hours four (4) 



                                                            as needed. hours as 



                                                                  needed. 

 

      ibuprofen                   Yes               200mg       Take 200 Take 20

0 



      (ADVIL,MOTR                                                 mg by mg by 



      IN) 200 MG                                                 mouth mouth 



      tablet                                                 every six every six

 



                                                            (6) hours (6) hours 



                                                            as needed as needed 



                                                            for pain. for pain. 

 

      Aspirin                   Yes               1                       



      Adult                                                             

 

      FLUoxetine                   Yes               1                       



      HCl                                                               

 

      Acetaminoph                   Yes               1                       



      en                                                                

 

      Antacid                   Yes               2                       

 

      Ibuprofen                   Yes               1                       

 

      Ondansetron                   Yes               1                       



      HCl                                                               

 

      oxyCODONE                   Yes               1                       



      HCl                                                               

 

      Pantoprazol                   Yes               1                       



      e Sodium                                                             

 

      PriLOSEC                   Yes               1                       

 

      PROzac                   Yes               1                       

 

      Sucralfate                   Yes               1                       

 

      FLUoxetine             2020- No                40mg        Take 40 mg Take

 40 



      (PROZAC) 40             10-15                               by mouth mg by

 



      MG capsule             00:00                               daily. mouth 



                        :00                                       daily. 

 

      naproxen             - No                            Take by Take by 



      (NAPROSYN)             10-09                               mouth Two mouth

 Two 



      125 mg/5 mL             00:00                               (2) times (2) 

times 



      suspension             :00                                 a day. a day. 







Problems







        Condition Condition Condition Status  Onset   Resolution Last    Treatin

g Comments



        Name    Details Category         Date    Date    Treatment Clinician 



                                                        Date            

 

        Not on file Not on file [id number]                                        

 

 

        Carcinoma Carcinoma Diagnosis active                                  



        of head of of head of                                                 



        pancreas pancreas                                                 







Procedures







                Procedure       Date / Time Performed Performing Clinician Devi

e

 

                COMPREHENSIVE METABOLIC PANEL 2020-10-15 03:58:00 Huber, Alvarado    

  

 

                MAGNESIUM       2020-10-15 03:58:00 Huber, Alvarado      

 

                PHOSPHORUS      2020-10-15 03:58:00 Huber, Alvarado      

 

                CBC             2020-10-15 03:58:00 Huber, Alvarado      

 

                COMPREHENSIVE METABOLIC PANEL 2020-10-14 03:53:00 Huber, Alvarado    

  

 

                MAGNESIUM       2020-10-14 03:53:00 Huber, Alvarado      

 

                PHOSPHORUS      2020-10-14 03:53:00 Huber, Alvarado      

 

                CBC             2020-10-14 03:53:00 Huber, Alvarado      

 

                UPPER ENDOSCOPIC ULTRASOUND 2020-10-13 13:58:33 Favian Meyer

[facility] GI IMAGING (NO 2020-10-13 13:28:03 Karo Solis 



                INTERPRETATION)                                 

 

                COMPREHENSIVE METABOLIC PANEL 2020-10-13 04:19:00 Huber, Alvarado    

  

 

                MAGNESIUM       2020-10-13 04:19:00 Huber, Alvarado      

 

                PHOSPHORUS      2020-10-13 04:19:00 Huber, Alvarado      

 

                CBC             2020-10-13 04:19:00 Huber, Alvarado      

 

                PERIPHERAL INTRAVENOUS DEVICE BY 2020-10-12 08:42:24 José Miguel Locke                                             

 

                COMPREHENSIVE METABOLIC PANEL 2020-10-12 03:54:00 Huber, Alvarado    

  

 

                MAGNESIUM       2020-10-12 03:54:00 Huber, Alvarado      

 

                PHOSPHORUS      2020-10-12 03:54:00 Huber, Alvarado      

 

                CBC             2020-10-12 03:54:00 Huber, Alvarado      

 

                COMPREHENSIVE METABOLIC PANEL 2020-10-11 04:00:00 Huber, Alvarado    

  

 

                MAGNESIUM       2020-10-11 04:00:00 Huber, Alvarado      

 

                PHOSPHORUS      2020-10-11 04:00:00 Huber, Alvarado      

 

                CBC             2020-10-11 04:00:00 Huber, Alvarado      

 

                COMPREHENSIVE METABOLIC PANEL 2020-10-10 03:55:00 Huber, Alvarado    

  

 

                MAGNESIUM       2020-10-10 03:55:00 Huber, Alvarado      

 

                PHOSPHORUS      2020-10-10 03:55:00 Huber, Alvarado      

 

                CBC             2020-10-10 03:55:00 Huber, Alvarado      

 

                SURGICAL PATHOLOGY EXAM 2020-10-09 18:45:00 Monica Lakhani 

 

                UGI ENDO, INCLUDE ESOPHAGUS, 2020-10-09 18:16:00 Monica Lakhani Scot

t 



                STOMACH, & DUODENUM &/OR JEJUNUM;                               

  



                DX W/WO COLLECTION SPECIMN, BY                                 



                BRUSH OR WASH                                   

 

                UGI W/ TRANSENDOSCOPIC ULTRASOUND 2020-10-09 18:16:00 Monica Lakhani 



                GUIDED INTRAMURAL/TRANSMURAL FINE                               

  



                NEEDLE ASPIRATION/BIOPSY(S),                                 



                ESOPHAGUS                                       

 

                UPPER ENDOSCOPIC ULTRASOUND 2020-10-09 17:02:29 Favian Meyer

ul 

 

                ERCP            2020-10-09 15:07:17 Favian Meyer 

 

                [facility] GI IMAGING (NO 2020-10-09 15:03:03 Monica Lakhani 



                INTERPRETATION)                                 

 

                COMPREHENSIVE METABOLIC PANEL 2020-10-09 03:59:00 Huber, Alvarado    

  

 

                MAGNESIUM       2020-10-09 03:59:00 Huber, Alvarado      

 

                PHOSPHORUS      2020-10-09 03:59:00 Huber, Alvarado      

 

                CBC             2020-10-09 03:59:00 Huber, Alvarado      

 

                COVID-19 PCR    2020-10-08 18:29:00 MidtlingGuy 

 

                BASIC METABOLIC PANEL 2020-10-08 14:27:00 MidtlGuy lopes 

 

                US OUTSIDE FILM FOR CONTINUED CARE 2020-10-08 03:58:21 DIANE Locke 

 

                CT BODY OUTSIDE FILM FOR CONTINUED 2020-10-08 03:58:10 DIANE Locke 



                CARE                                            

 

                COMPREHENSIVE METABOLIC PANEL 2020-10-08 03:40:00 Huber, Alvarado    

  

 

                CEA             2020-10-08 03:40:00 Yg, Inez    

 

                MAGNESIUM       2020-10-08 03:40:00 Huber, Alvarado      

 

                PHOSPHORUS      2020-10-08 03:40:00 Huber, Alvarado      

 

                CBC             2020-10-08 03:40:00 Huber, Alvarado      

 

                CANCER ANTIGEN 19-9 2020-10-08 03:40:00 Yg, Inez    

 

                Hepatoesophagostromy                                 

 

                EUS bx                                          







Results







           Test Description Test Time  Test Comments Text Results Atomic Results

 Result 

Comments









                WBC             2020 16:52:00                 









                          Test Item    Value        Reference Range Comments









                WBC (test code = WBC) 26.3000         4.0000-10.0000  

 

                Lymphocytes % (test code = Lymphocytes %) 4.3000 %        22.400

0-43.6000 

 

                MID% (test code = MID%) 7.3000 %        1.2000-11.2000  

 

                Neutrophils % (test code = Neutrophils %) 88.4000 %       48.900

0-69.9000 

 

                Lymphocytes (test code = Lymphocytes) 1.1000          1.2000-3.2

000   

 

                MID (test code = MID) 1.9000          0.1000-1.1000   

 

                Neutrophils (test code = Neutrophils) 23.3000         1.5000-6.7

000   

 

                RBC (test code = RBC) 3.3300          3.7000-4.9000   

 

                HGB (test code = HGB) 9.7000 g/dL     11.2000-18.0000 

 

                HCT (test code = HCT) 28.0000 %       34.0000-44.0000 

 

                MCV (test code = MCV) 84.1000 fL      80.0000-94.0000 

 

                MCH (test code = MCH) 29.2000 pg      27.0000-34.0000 

 

                MCHC (test code = MCHC) 34.7000 g/dL    31.5000-36.0000 

 

                RDW (test code = RDW) 15.6000         11.0000-18.0000 

 

                PLT (test code = PLT) 793.0000        140.0000-440.0000 

 

                MPV (test code = MPV) 8.8000 fL       6.8000-10.6000  



Comprehensive Metabolic Panel (10/15/2020  3:58 AM EDT)2020-10-15 03:58:00





                Test Item       Value           Reference Range Comments

 

                Sodium (test code = Sodium) 138 mmol/L      135 - 145 mmol/L 

 

                Potassium (test code = Potassium) 4.2 mmol/L      3.5 - 5.0 mmol

/L 

 

                Chloride (test code = Chloride) 103 mmol/L      98 - 107 mmol/L 

 

                Anion Gap (test code = Anion Gap) 9 mmol/L        7 - 15 mmol/L 

  

 

                CO2 (test code = CO2) 26.0 mmol/L     22.0 - 30.0 mmol/L 

 

                BUN (test code = BUN) 8 mg/dL         7 - 21 mg/dL    

 

                Creatinine (test code = Creatinine) 0.66 mg/dL      0.70 - 1.30 

mg/dL 

 

                BUN/Creatinine Ratio (test code = 12                            

  



                BUN/Creatinine Ratio)                                 

 

                EGFR CKD-EPI Non-, Male (test >90             >=

60 mL/min/1.73m2 



                code = EGFR CKD-EPI Non-,                       

          



                Male)                                           

 

                EGFR CKD-EPI , Male (test >90             >=60 m

L/min/1.73m2 



                code = EGFR CKD-EPI , Male)                     

            

 

                Glucose (test code = Glucose) 107 mg/dL       70 - 179 mg/dL  

 

                Calcium (test code = Calcium) 8.5 mg/dL       8.5 - 10.2 mg/dL 

 

                Albumin (test code = Albumin) 2.9 g/dL        3.5 - 5.0 g/dL  

 

                Total Protein (test code = Total Protein) 5.9 g/dL        6.5 - 

8.3 g/dL  

 

                Total Bilirubin (test code = Total Bilirubin) 6.7 mg/dL       0.

0 - 1.2 mg/dL 

 

                AST (test code = AST) 51 U/L          19 - 55 U/L     

 

                ALT (test code = ALT) 94 U/L          <50 U/L         

 

                Alkaline Phosphatase (test code = Alkaline 631 U/L         38 - 

126 U/L    



                Phosphatase)                                    



CBC (10/15/2020  3:58 AM EDT)2020-10-15 03:58:00





                Test Item       Value           Reference Range Comments

 

                WBC (test code = WBC) 21.1 10*9/L     4.5 - 11.0 10*9/L 

 

                RBC (test code = RBC) 3.86 10*12/L    4.50 - 5.90 10*12/L 

 

                HGB (test code = HGB) 11.0 g/dL       13.5 - 17.5 g/dL 

 

                HCT (test code = HCT) 34.6 %          41.0 - 53.0 %   

 

                MCV (test code = MCV) 89.7 fL         80.0 - 100.0 fL 

 

                MCH (test code = MCH) 28.4 pg         26.0 - 34.0 pg  

 

                MCHC (test code = MCHC) 31.7 g/dL       31.0 - 37.0 g/dL 

 

                RDW (test code = RDW) 17.2 %          12.0 - 15.0 %   

 

                MPV (test code = MPV) 11.4 fL         7.0 - 10.0 fL   

 

                Platelet (test code = Platelet) 344 10*9/L      150 - 440 10*9/L

 



Magnesium Level (10/15/2020  3:58 AM EDT)2020-10-15 03:58:00





                Test Item       Value           Reference Range Comments

 

                Magnesium (test code = Magnesium) 1.9 mg/dL       1.6 - 2.2 mg/d

L 



Phosphorus Level (10/15/2020  3:58 AM EDT)2020-10-15 03:58:00





                Test Item       Value           Reference Range Comments

 

                Phosphorus (test code = Phosphorus) 3.1 mg/dL       2.9 - 4.7 mg

/dL 



Phosphorus Level (10/14/2020  3:53 AM EDT)2020-10-14 03:53:00





                Test Item       Value           Reference Range Comments

 

                Phosphorus (test code = Phosphorus) 2.6 mg/dL       2.9 - 4.7 mg

/dL 



Comprehensive Metabolic Panel (10/14/2020  3:53 AM EDT)2020-10-14 03:53:00





                Test Item       Value           Reference Range Comments

 

                Sodium (test code = Sodium) 136 mmol/L      135 - 145 mmol/L 

 

                Potassium (test code = Potassium) 4.3 mmol/L      3.5 - 5.0 mmol

/L 

 

                Chloride (test code = Chloride) 100 mmol/L      98 - 107 mmol/L 

 

                Anion Gap (test code = Anion Gap) 12 mmol/L       7 - 15 mmol/L 

  

 

                CO2 (test code = CO2) 24.0 mmol/L     22.0 - 30.0 mmol/L 

 

                BUN (test code = BUN) 6 mg/dL         7 - 21 mg/dL    

 

                Creatinine (test code = Creatinine) 0.62 mg/dL      0.70 - 1.30 

mg/dL 

 

                BUN/Creatinine Ratio (test code = 10                            

  



                BUN/Creatinine Ratio)                                 

 

                EGFR CKD-EPI Non-, Male (test >90             >=

60 mL/min/1.73m2 



                code = EGFR CKD-EPI Non-,                       

          



                Male)                                           

 

                EGFR CKD-EPI , Male (test >90             >=60 m

L/min/1.73m2 



                code = EGFR CKD-EPI , Male)                     

            

 

                Glucose (test code = Glucose) 137 mg/dL       70 - 179 mg/dL  

 

                Calcium (test code = Calcium) 8.6 mg/dL       8.5 - 10.2 mg/dL 

 

                Albumin (test code = Albumin) 3.0 g/dL        3.5 - 5.0 g/dL  

 

                Total Protein (test code = Total Protein) 6.0 g/dL        6.5 - 

8.3 g/dL  

 

                Total Bilirubin (test code = Total Bilirubin) 9.8 mg/dL       0.

0 - 1.2 mg/dL 

 

                AST (test code = AST) 95 U/L          19 - 55 U/L     

 

                ALT (test code = ALT) 100 U/L         <50 U/L         

 

                Alkaline Phosphatase (test code = Alkaline 715 U/L         38 - 

126 U/L    



                Phosphatase)                                    



CBC (10/14/2020  3:53 AM EDT)2020-10-14 03:53:00





                Test Item       Value           Reference Range Comments

 

                WBC (test code = WBC) 18.1 10*9/L     4.5 - 11.0 10*9/L 

 

                RBC (test code = RBC) 4.16 10*12/L    4.50 - 5.90 10*12/L 

 

                HGB (test code = HGB) 11.5 g/dL       13.5 - 17.5 g/dL 

 

                HCT (test code = HCT) 37.0 %          41.0 - 53.0 %   

 

                MCV (test code = MCV) 88.8 fL         80.0 - 100.0 fL 

 

                MCH (test code = MCH) 27.7 pg         26.0 - 34.0 pg  

 

                MCHC (test code = MCHC) 31.2 g/dL       31.0 - 37.0 g/dL 

 

                RDW (test code = RDW) 17.2 %          12.0 - 15.0 %   

 

                MPV (test code = MPV) 10.4 fL         7.0 - 10.0 fL   

 

                Platelet (test code = Platelet) 345 10*9/L      150 - 440 10*9/L

 



Magnesium Level (10/14/2020  3:53 AM EDT)2020-10-14 03:53:00





                Test Item       Value           Reference Range Comments

 

                Magnesium (test code = Magnesium) 2.0 mg/dL       1.6 - 2.2 mg/d

L 



[place] [facility] Interpretation[facility] (10/13/2020  1:28 PM EDT)2020-10-13 
15:45:42[place] [facility] (No Interpretation) (10/13/2020 1:28 PM 
EDT)SpecimenNarrativePerformed AtThis order was placed for internal RIS 
purposes. This order does not require a diagnostic report. [facility] RADProcedure
NoteInterface, Rad Results In - 10/13/2020 3:45 PM EDTThis order was placed for 
internal RIS purposes. This order does not require a diagnostic report. 
dmkPerforming OrganizationAddressCity/State/ZIP CodePhone Number[facility] [place] RADPhys[facility] [place] 
[id number] [place], [place] [address]Cxvyg Endoscopic Ultrasound (EUS) 
(10/13/2020  1:58 PM EDT)2020-10-13 13:58:33Upper Endoscopic Ultrasound (EUS) 
(10/13/2020 1:58 PM EDT)SpecimenNarrativePerformed At______________
_________________________________________________________________Patient Name: 
Leon Hagan       Procedure Date: 10/13/2020 1:58 PMMRN: 
[id number]           YOB: 1970Admit Type: 
Inpatient         Age: 50Room[facility] [address] [place] [address]    
 Gender: MaleNote Status: Finalized        Instrument Name: 
[id number]__
_____________________________________________________________________________ 
Procedure:      Upper EUS guided biliary interventionIndications: 
    Common bile duct dilation (acquired) seen on CT scan,     
      Suspected mass in pancreas on CT scan; failed ERCPProviders: 
      DEONTE ROBB MD, KARO SOLIS MD,     
       TRISTAN BLACK MD (Fellow), Bridgette Gómez MD:     
FAVIAN MEYER DO (Referring MD)Medicines:       General 
AnesthesiaComplications:     No immediate complications. Estimated blood
loss:            
Minimal._________________________________________________
______________________________Procedure:       Pre-Anesthesia 
Assessment:           - Prior to the procedure, a History 
and Physical was           performed, and patient 
medications and allergies were            reviewed. The 
patient's tolerance of previous            anesthesia was 
also reviewed. The risks and benefits            of the 
procedure and the sedation options and risks            
were discussed with the patient. All questions were         
 answered, and informed consent was obtained. Prior         
  Anticoagulants: The patient has taken no previous         
   anticoagulantor antiplatelet agents. ASA Grade         
   Assessment: III - A patient with severe systemic        
    disease. After reviewing the risks and benefits, the     
      patient was deemed in satisfactory condition to     
      undergo the procedure.            After 
obtaining informed consent, the endoscope was            
passed under direct vision. Throughout the procedure,         
  the patient's blood pressure, pulse, and oxygen         
  saturations were monitored continuously. The          
 Duodenoscope was introduced through the mouth, and         
  advanced to the second part of duodenum. The upper EUS       
     was accomplished without difficulty. The patient      
      tolerated the procedure well.           
                          
 Findings:   ENDOSONOGRAPHIC FINDING: :   There was dilation diffusely
throughout the intrahepatic bile duct(s).   The decision was made to create 
a hepaticoenterostomy. Once an   appropriate position was identified, the 
common wall between the distal   esophagus and a dilated left intrahepatic 
duct was interrogated   utilizing color Doppler imaging to identify 
interposed vessels. The  esophageal wall and the duct were punctured under 
endosonographic   guidance with a 19 gauge needle. The biliary tree was 
injected with full   strength ionic contrast and a cholangiogram was 
obtained under   fluoroscopy. A 0.025 inch x 450 cm angled Visiglide wire 
was inserted   into the left intrahepatic duct under fluoroscopic guidance 
and allowed   to coil twice. The opening was dilated with a 6 mm balloon 
dilator. An 8   cm long X 8 mm stent (Viabil fully covered metal biliary) 
was placed   into the left intrahepatic duct through the 
hepaticoesophagostomy. The   stent was successfully placed. Through the 
Viabil a 7Fr X 10 cm double   pigtail stent was placed.   A single round
lesion was identified endosonographically in the left   lobe of the liver. 
The endosonographic appearance was suggestive of   metastasis. The lesion 
was hyperechoic.                     
                  Impression:     
- There was dilation in the intrahepatic bile ducts,        
  diffusely.            - A single metastatic lesion 
was found in the left            lobe of the liver.   
         - Successful EUS-guided hepaticoesophagostomy.   
         - No specimens collected.Recommendation:    -
Return patient to [facility].           
 - Resume previous diet today.            - Continue 
present medications.            - Observe patient's 
clinical course.            - Return to referring 
physician as previously            scheduled.     
                          
        Procedure Code(s):   --- Professional ---    
        32166, Anastomosis, of extrahepatic biliary ducts and 
          gastrointestinal tract          
  50423, Esophagogastroduodenoscopy, flexible,          
 transoral; with endoscopic ultrasound examination          
 limited to the esophagus, stomach or duodenum, and         
   adjacent structures            34598, 
Cholangiography and/or pancreatography;           
intraoperative, radiological supervision and            
interpretationDiagnosis Code(s):   --- Professional ---       
    K83.8, Otherspecified diseases of biliary tract       
    C78.7, Secondary malignant neoplasm of liver and       
     intrahepatic bile duct            R93.3, 
Abnormal findings on diagnostic imaging of            
other parts of digestive tract CPT copyright 2019 [employer]. 
All rights reserved. The codes documented in this report are preliminary and 
upon  review may be revised to meet current compliance 
requirements.Attending Participation:   I was present and participated 
during the entire procedure, including   non-key portions.      
                          
      Electronically Signed By Deonte Robb MD_______
_______________DEONTE ROBB MD10/ 2:57:44 PMThe attending 
physician was present throughout the entire procedural suite including 
insertion, viewing, and removal. Electronically Signed by Karo Solis MD________________________KARO SOLIS MD10/ 2:56:48 PM 
___________________________TRISTAN BLACK MDNumber of Addenda: 0 Note 
Initiated On: 10/13/2020 1:58PMEMC RADProcedure NoteInterface, Rad Results In - 
10/13/2020 3:01 PM EDT____________________________
___________________________________________________ Patient Name: Leon Hagan 
Procedure Date: 10/13/2020 1:58 PM MRN: [id number] YOB: 1970 
Admit Type: Inpatient Age: 50 Room: [address] Gender: Male Note 
Status: Finalized Instrument Name: [id number] __________________
_____________________________________________________________ Procedure: Upper 
EUS guided biliary intervention Indications: Common bile duct dilation 
(acquired) seen on CT scan, Suspected mass in pancreas on CT scan; failed ERCP 
Providers: DEONTE ROBB MD, KARO SOLIS MD, TRISTAN BLACK MD (Fellow), Erna Rueda, Bridgette ellison Referring MD: FAVIAN MEYER DO (Referring MD) Medicines: General Anesthesia Complications: No 
immediate complications. Estimated blood loss: Minimal. 
_______________________________________________________________________________ 
Procedure: Pre-Anesthesia Assessment: - Prior to the procedure, a History and 
Physical was performed, and patient medications and allergies were reviewed. The
patient's tolerance of previous anesthesia wasalso reviewed. The risks and 
benefits of the procedure and the sedation options and risks were discussed with
the patient. All questions were answered, and informed consent was obtained. 
Prior Anticoagulants: The patient has taken no previous anticoagulant or 
antiplatelet agents. ASA Grade Assessment:III - A patient with severe systemic 
disease. After reviewing the risks and benefits, the patient was deemed in 
satisfactory condition to undergo the procedure. After obtaining informed 
consent, the endoscope was passed under direct vision. Throughout the procedure,
the patient's blood pressure, pulse, and oxygen saturations were monitored 
continuously. The Duodenoscope was introduced through the mouth, and advanced to
the second part of duodenum. The upper EUS was accomplished without difficulty. 
The patient tolerated the procedure well. Findings: ENDOSONOGRAPHIC FINDING: : 
There was dilation diffusely throughout the intrahepatic bile duct(s). The 
decision was made to create a hepaticoenterostomy. Once an appropriate position 
was identified, the common wall between the distal esophagus and a dilated left 
intrahepatic duct was interrogated utilizing color Doppler imaging to identify 
interposed vessels. The esophageal wall and the duct were punctured under 
endosonographic guidance with a 19 gauge needle. The biliary tree was injected 
with full strength ionic contrast and a cholangiogram was obtained under 
fluoroscopy. A 0.025 inch x 450 cm angled Visiglide wire was inserted into the 
left intrahepatic duct under fluoroscopic guidance and allowed to coil twice. 
The opening was dilated with a 6 mm balloon dilator. An 8 cm long X 8 mm stent 
(Viabil fully covered metal biliary) was placed into the left intrahepatic duct 
through the hepaticoesophagostomy. The stent was successfully placed. Through 
the Viabil a 7Fr X 10 cm double pigtail stent was placed. A single round lesion 
was identified endosonographically in the left lobe of the liver. The 
endosonographic appearance was suggestive of metastasis. The lesion was 
hyperechoic. Impression: - There was dilation in the intrahepatic bile ducts, di
ffusely. - A single metastatic lesion was found in the left lobe of the liver. -
Successful EUS-guided hepaticoesophagostomy. - No specimens collected. 
Recommendation: - Return patient to hospital wardfor ongoing care. - Resume 
previous diet today. - Continue present medications. - Observe patient's c
linical course. - Return to referring physician as previously scheduled. 
Procedure Code(s): --- Professional --- 08354, Anastomosis, of extrahepatic 
biliary ducts and gastrointestinal tract 69662, Esophagogastroduodenoscopy, 
flexible, transoral; with endoscopic ultrasound examination limited to the eso
phagus, stomach or duodenum, and adjacent structures 61952, Cholangiography 
and/or pancreatography; intraoperative, radiological supervision and 
interpretation Diagnosis Code(s): --- Professional --- K83.8, Other specified 
diseases of biliary tract C78.7, Secondary malignant neoplasm of liver and intra
hepatic bile duct R93.3, Abnormal findings on diagnostic imaging of other parts 
of digestive tract CPT copyright 2019 [employer] Association. All rights 
reserved. The codes documented in this report are preliminary and upon  
review may be revised to meet current compliance requirements. Attending 
Participation: I was present and participated during the entire procedure, 
including non-key portions. Electronically Signed By Deonte Robb MD 
______________________ DEONTE ROBB MD 10/13/2020 2:57:44 PM The 
attending physician was present throughout the entire procedural suite including
insertion, viewing, and removal. Electronically Signed by Karo Solis MD 
________________________ KARO SOLIS MD 10/13/2020 2:56:48 PM 
___________________________ TRISTAN BLACK MD Number of Addenda: 0 
Note Initiated On: 10/13/2020 1:58 PMPerforming OrganizationAddressCity/Sta
te/ZIP CodePhone NumberPhys[facility] [place] RADEMC [id number] [facility] [place] [address]
Comprehensive Metabolic Panel (10/13/2020  4:19 AM EDT)2020-10-13 04:19:00





                Test Item       Value           Reference Range Comments

 

                Sodium (test code = Sodium) 139 mmol/L      135 - 145 mmol/L 

 

                Potassium (test code = Potassium) 4.5 mmol/L      3.5 - 5.0 mmol

/L 

 

                Chloride (test code = Chloride) 102 mmol/L      98 - 107 mmol/L 

 

                Anion Gap (test code = Anion Gap) 15 mmol/L       7 - 15 mmol/L 

  

 

                CO2 (test code = CO2) 22.0 mmol/L     22.0 - 30.0 mmol/L 

 

                BUN (test code = BUN) 7 mg/dL         7 - 21 mg/dL    

 

                Creatinine (test code = Creatinine) 0.61 mg/dL      0.70 - 1.30 

mg/dL 

 

                BUN/Creatinine Ratio (test code = 11                            

  



                BUN/Creatinine Ratio)                                 

 

                EGFR CKD-EPI Non-, Male (test >90             >=

60 mL/min/1.73m2 



                code = EGFR CKD-EPI Non-,                       

          



                Male)                                           

 

                EGFR CKD-EPI , Male (test >90             >=60 m

L/min/1.73m2 



                code = EGFR CKD-EPI , Male)                     

            

 

                Glucose (test code = Glucose) 118 mg/dL       70 - 179 mg/dL  

 

                Calcium (test code = Calcium) 9.1 mg/dL       8.5 - 10.2 mg/dL 

 

                Albumin (test code = Albumin) 3.4 g/dL        3.5 - 5.0 g/dL  

 

                Total Protein (test code = Total Protein) 6.7 g/dL        6.5 - 

8.3 g/dL  

 

                Total Bilirubin (test code = Total Bilirubin) 13.8 mg/dL      0.

0 - 1.2 mg/dL 

 

                AST (test code = AST) 65 U/L          19 - 55 U/L     

 

                ALT (test code = ALT) 95 U/L          <50 U/L         

 

                Alkaline Phosphatase (test code = Alkaline 707 U/L         38 - 

126 U/L    



                Phosphatase)                                    



CBC (10/13/2020  4:19 AM EDT)2020-10-13 04:19:00





                Test Item       Value           Reference Range Comments

 

                WBC (test code = WBC) 20.1 10*9/L     4.5 - 11.0 10*9/L 

 

                RBC (test code = RBC) 4.46 10*12/L    4.50 - 5.90 10*12/L 

 

                HGB (test code = HGB) 12.4 g/dL       13.5 - 17.5 g/dL 

 

                HCT (test code = HCT) 39.5 %          41.0 - 53.0 %   

 

                MCV (test code = MCV) 88.5 fL         80.0 - 100.0 fL 

 

                MCH (test code = MCH) 27.9 pg         26.0 - 34.0 pg  

 

                MCHC (test code = MCHC) 31.5 g/dL       31.0 - 37.0 g/dL 

 

                RDW (test code = RDW) 16.8 %          12.0 - 15.0 %   

 

                MPV (test code = MPV) 10.6 fL         7.0 - 10.0 fL   

 

                Platelet (test code = Platelet) 385 10*9/L      150 - 440 10*9/L

 



Magnesium Level (10/13/2020  4:19 AM EDT)2020-10-13 04:19:00





                Test Item       Value           Reference Range Comments

 

                Magnesium (test code = Magnesium) 2.2 mg/dL       1.6 - 2.2 mg/d

L 



Phosphorus Level (10/13/2020  4:19 AM EDT)2020-10-13 04:19:00





                Test Item       Value           Reference Range Comments

 

                Phosphorus (test code = Phosphorus) 3.2 mg/dL       2.9 - 4.7 mg

/dL 



[place] [facility] (No Interpretation) (10/09/2020  3:03 PM EDT)2020-10-12 
08:48:44[place] [facility] (No Interpretation) (10/09/2020 3:03 PM 
EDT)SpecimenNarrativePerformed AtThis order was placed for internal RIS 
purposes. This order does not require a diagnostic report. [facility] RADProcedure
NoteInterface, Rad Results In - 10/12/2020 8:48 AM EDTThis order was placed for 
internal RIS purposes. This order does not require a diagnostic report. 
dmkPerforming OrganizationAddressCity/State/ZIP CodePhone NumberField [facility] 
[id number] [facility].[place], [place] [address]Agectdrciuuvj Metabolic Panel (10/12/2020  
3:54 AM EDT)2020-10-12 03:54:00





                Test Item       Value           Reference Range Comments

 

                Sodium (test code = Sodium) 135 mmol/L      135 - 145 mmol/L 

 

                Potassium (test code = Potassium) 4.0 mmol/L      3.5 - 5.0 mmol

/L 

 

                Chloride (test code = Chloride) 99 mmol/L       98 - 107 mmol/L 

 

                Anion Gap (test code = Anion Gap) 10 mmol/L       7 - 15 mmol/L 

  

 

                CO2 (test code = CO2) 26.0 mmol/L     22.0 - 30.0 mmol/L 

 

                BUN (test code = BUN) 4 mg/dL         7 - 21 mg/dL    

 

                Creatinine (test code = Creatinine) 0.63 mg/dL      0.70 - 1.30 

mg/dL 

 

                BUN/Creatinine Ratio (test code = 6                             

  



                BUN/Creatinine Ratio)                                 

 

                EGFR CKD-EPI Non-, Male (test >90             >=

60 mL/min/1.73m2 



                code = EGFR CKD-EPI Non-,                       

          



                Male)                                           

 

                EGFR CKD-EPI , Male (test >90             >=60 m

L/min/1.73m2 



                code = EGFR CKD-EPI , Male)                     

            

 

                Glucose (test code = Glucose) 110 mg/dL       70 - 179 mg/dL  

 

                Calcium (test code = Calcium) 8.7 mg/dL       8.5 - 10.2 mg/dL 

 

                Albumin (test code = Albumin) 3.0 g/dL        3.5 - 5.0 g/dL  

 

                Total Protein (test code = Total Protein) 6.2 g/dL        6.5 - 

8.3 g/dL  

 

                Total Bilirubin (test code = Total Bilirubin) 13.5 mg/dL      0.

0 - 1.2 mg/dL 

 

                AST (test code = AST) 62 U/L          19 - 55 U/L     

 

                ALT (test code = ALT) 98 U/L          <50 U/L         

 

                Alkaline Phosphatase (test code = Alkaline 680 U/L         38 - 

126 U/L    



                Phosphatase)                                    



CBC (10/12/2020  3:54 AM EDT)2020-10-12 03:54:00





                Test Item       Value           Reference Range Comments

 

                WBC (test code = WBC) 18.0 10*9/L     4.5 - 11.0 10*9/L 

 

                RBC (test code = RBC) 4.28 10*12/L    4.50 - 5.90 10*12/L 

 

                HGB (test code = HGB) 12.1 g/dL       13.5 - 17.5 g/dL 

 

                HCT (test code = HCT) 37.8 %          41.0 - 53.0 %   

 

                MCV (test code = MCV) 88.3 fL         80.0 - 100.0 fL 

 

                MCH (test code = MCH) 28.4 pg         26.0 - 34.0 pg  

 

                MCHC (test code = MCHC) 32.1 g/dL       31.0 - 37.0 g/dL 

 

                RDW (test code = RDW) 16.5 %          12.0 - 15.0 %   

 

                MPV (test code = MPV) 10.8 fL         7.0 - 10.0 fL   

 

                Platelet (test code = Platelet) 367 10*9/L      150 - 440 10*9/L

 



Magnesium Level (10/12/2020  3:54 AM EDT)2020-10-12 03:54:00





                Test Item       Value           Reference Range Comments

 

                Magnesium (test code = Magnesium) 2.0 mg/dL       1.6 - 2.2 mg/d

L 



Phosphorus Level (10/12/2020  3:54 AM EDT)2020-10-12 03:54:00





                Test Item       Value           Reference Range Comments

 

                Phosphorus (test code = Phosphorus) 3.1 mg/dL       2.9 - 4.7 mg

/dL 



Peripheral Intravenous Device by Vascular Access Team (10/12/2020  8:42 AM EDT)
2020-10-12 00:00:00Peripheral Intravenous Device by Vascular Access Team 
(10/12/2020 8:42 AM EDT)NarrativePerformed Efrain Arias RN   
10/12/2020 8:43 AMArrived in the pt. Room and primary care nurse had placed 
piv.no neew orders noted at this time.UNCHCSVATPerforming 
OrganizationAddressCity/State/ZIP CodePhone NumberUNCHCSVATComprehensive 
Metabolic Panel (10/11/2020  4:00 AM EDT)2020-10-11 04:00:00





                Test Item       Value           Reference Range Comments

 

                Sodium (test code = Sodium) 137 mmol/L      135 - 145 mmol/L 

 

                Potassium (test code = Potassium) 3.9 mmol/L      3.5 - 5.0 mmol

/L 

 

                Chloride (test code = Chloride) 101 mmol/L      98 - 107 mmol/L 

 

                Anion Gap (test code = Anion Gap) 8 mmol/L        7 - 15 mmol/L 

  

 

                CO2 (test code = CO2) 28.0 mmol/L     22.0 - 30.0 mmol/L 

 

                BUN (test code = BUN) 3 mg/dL         7 - 21 mg/dL    

 

                Creatinine (test code = Creatinine) 0.69 mg/dL      0.70 - 1.30 

mg/dL 

 

                BUN/Creatinine Ratio (test code = 4                             

  



                BUN/Creatinine Ratio)                                 

 

                EGFR CKD-EPI Non-, Male (test >90             >=

60 mL/min/1.73m2 



                code = EGFR CKD-EPI Non-,                       

          



                Male)                                           

 

                EGFR CKD-EPI , Male (test >90             >=60 m

L/min/1.73m2 



                code = EGFR CKD-EPI , Male)                     

            

 

                Glucose (test code = Glucose) 105 mg/dL       70 - 179 mg/dL  

 

                Calcium (test code = Calcium) 8.4 mg/dL       8.5 - 10.2 mg/dL 

 

                Albumin (test code = Albumin) 2.8 g/dL        3.5 - 5.0 g/dL  

 

                Total Protein (test code = Total Protein) 5.9 g/dL        6.5 - 

8.3 g/dL  

 

                Total Bilirubin (test code = Total Bilirubin) 13.0 mg/dL      0.

0 - 1.2 mg/dL 

 

                AST (test code = AST) 64 U/L          19 - 55 U/L     

 

                ALT (test code = ALT) 104 U/L         <50 U/L         

 

                Alkaline Phosphatase (test code = Alkaline 596 U/L         38 - 

126 U/L    



                Phosphatase)                                    



CBC (10/11/2020  4:00 AM EDT)2020-10-11 04:00:00





                Test Item       Value           Reference Range Comments

 

                WBC (test code = WBC) 16.1 10*9/L     4.5 - 11.0 10*9/L 

 

                RBC (test code = RBC) 4.02 10*12/L    4.50 - 5.90 10*12/L 

 

                HGB (test code = HGB) 11.4 g/dL       13.5 - 17.5 g/dL 

 

                HCT (test code = HCT) 35.8 %          41.0 - 53.0 %   

 

                MCV (test code = MCV) 89.0 fL         80.0 - 100.0 fL 

 

                MCH (test code = MCH) 28.2 pg         26.0 - 34.0 pg  

 

                MCHC (test code = MCHC) 31.7 g/dL       31.0 - 37.0 g/dL 

 

                RDW (test code = RDW) 16.5 %          12.0 - 15.0 %   

 

                MPV (test code = MPV) 10.8 fL         7.0 - 10.0 fL   

 

                Platelet (test code = Platelet) 344 10*9/L      150 - 440 10*9/L

 



Magnesium Level (10/11/2020  4:00 AM EDT)2020-10-11 04:00:00





                Test Item       Value           Reference Range Comments

 

                Magnesium (test code = Magnesium) 2.1 mg/dL       1.6 - 2.2 mg/d

L 



Phosphorus Level (10/11/2020  4:00 AM EDT)2020-10-11 04:00:00





                Test Item       Value           Reference Range Comments

 

                Phosphorus (test code = Phosphorus) 3.2 mg/dL       2.9 - 4.7 mg

/dL 



Comprehensive Metabolic Panel (10/10/2020  3:55 AM EDT)2020-10-10 03:55:00





                Test Item       Value           Reference Range Comments

 

                Sodium (test code = Sodium) 142 mmol/L      135 - 145 mmol/L 

 

                Potassium (test code = Potassium) 3.9 mmol/L      3.5 - 5.0 mmol

/L 

 

                Chloride (test code = Chloride) 103 mmol/L      98 - 107 mmol/L 

 

                Anion Gap (test code = Anion Gap) 13 mmol/L       7 - 15 mmol/L 

  

 

                CO2 (test code = CO2) 26.0 mmol/L     22.0 - 30.0 mmol/L 

 

                BUN (test code = BUN) 4 mg/dL         7 - 21 mg/dL    

 

                Creatinine (test code = Creatinine) 0.63 mg/dL      0.70 - 1.30 

mg/dL 

 

                BUN/Creatinine Ratio (test code = 6                             

  



                BUN/Creatinine Ratio)                                 

 

                EGFR CKD-EPI Non-, Male (test >90             >=

60 mL/min/1.73m2 



                code = EGFR CKD-EPI Non-,                       

          



                Male)                                           

 

                EGFR CKD-EPI , Male (test >90             >=60 m

L/min/1.73m2 



                code = EGFR CKD-EPI , Male)                     

            

 

                Glucose (test code = Glucose) 115 mg/dL       70 - 179 mg/dL  

 

                Calcium (test code = Calcium) 8.6 mg/dL       8.5 - 10.2 mg/dL 

 

                Albumin (test code = Albumin) 2.9 g/dL        3.5 - 5.0 g/dL  

 

                Total Protein (test code = Total Protein) 6.0 g/dL        6.5 - 

8.3 g/dL  

 

                Total Bilirubin (test code = Total Bilirubin) 12.4 mg/dL      0.

0 - 1.2 mg/dL 

 

                AST (test code = AST) 65 U/L          19 - 55 U/L     

 

                ALT (test code = ALT) 112 U/L         <50 U/L         

 

                Alkaline Phosphatase (test code = Alkaline 641 U/L         38 - 

126 U/L    



                Phosphatase)                                    



CBC (10/10/2020  3:55 AM EDT)2020-10-10 03:55:00





                Test Item       Value           Reference Range Comments

 

                WBC (test code = WBC) 16.9 10*9/L     4.5 - 11.0 10*9/L 

 

                RBC (test code = RBC) 4.15 10*12/L    4.50 - 5.90 10*12/L 

 

                HGB (test code = HGB) 11.8 g/dL       13.5 - 17.5 g/dL 

 

                HCT (test code = HCT) 36.8 %          41.0 - 53.0 %   

 

                MCV (test code = MCV) 88.6 fL         80.0 - 100.0 fL 

 

                MCH (test code = MCH) 28.4 pg         26.0 - 34.0 pg  

 

                MCHC (test code = MCHC) 32.1 g/dL       31.0 - 37.0 g/dL 

 

                RDW (test code = RDW) 16.3 %          12.0 - 15.0 %   

 

                MPV (test code = MPV) 11.1 fL         7.0 - 10.0 fL   

 

                Platelet (test code = Platelet) 376 10*9/L      150 - 440 10*9/L

 



Magnesium Level (10/10/2020  3:55 AM EDT)2020-10-10 03:55:00





                Test Item       Value           Reference Range Comments

 

                Magnesium (test code = Magnesium) 1.9 mg/dL       1.6 - 2.2 mg/d

L 



Phosphorus Level (10/10/2020  3:55 AM EDT)2020-10-10 03:55:00





                Test Item       Value           Reference Range Comments

 

                Phosphorus (test code = Phosphorus) 3.9 mg/dL       2.9 - 4.7 mg

/dL 



Surgical pathology exam (10/09/2020  6:45 PM EDT)2020-10-09 18:45:00Final 
DiagnosisA: Pancreas, fine-needle biopsy: -Poorly differentiated adenocarcinoma 
with signet ring cell features. (See comment) This electronic signature is 
attestation that the pathologist personally reviewed the submitted material(s) 
and the final diagnosis reflects that evaluation.[facility] 
LABORATORIESElectronically signed by Jo Ann Stephen MD on 10/15/2020 at 
5:45 PMCommentThe tumor is formed of malignant, poorly cohesive cells with 
marked nuclear pleomorphism and signet ring cell features. Mucicarmine shows 
focal, weak cytoplasmic staining. The tumor is positive for pancytokeratin, CK7,
patchy CK20 and is negative for CDX2 on immunostains. This immunoprofile 
supports the above diagnosis. [facility] [place] LABORATORIESClinical 
HistoryPancreatic mass with biliary obstructionUNCH [facility] 
LABORATORIESGross DescriptionA: Pancreas Received is an appropriately labeled 
container. Specimen A: Site: Pancreatic head Method: Biopsy Measurement: 8 x 6 x
2 mm Comment: Multiple tan-brown tissue cores, filtered into a mesh bag Block: 
A1, NTR (Tony) [facility] [place] LABORATORIESMicroscopic 
DescriptionMicroscopic examination substantiates the above diagnosis.Resident 
Physician: None AssignedUNCH [facility] CLINICAL LABORATORIESDisclaimerUnless 
otherwise specified, specimens are preserved using 10% neutral buffered 
formalin. For cases in which immunohistochemical and/or in-situ hybridization 
stains are performed, the following statement applies: Appropriate controls for 
each stain (positive controls with or without negative controls) have been 
evaluated andstain as expected. These stains have not been separately validated 
for use on decalcified specimens and should be interpreted with caution in that 
setting. Some of the reagents used for these stains may be classified as analyte
specific reagents (ASR). Tests using ASRs were developed, and their performance 
characteristics were determined, by the Anatomic Pathology Department ([facility]). They have not been cleared or approved by the 
US Food and Drug Administration (FDA). The FDA does not require these tests to 
go through premarket FDA review. These tests are used for clinical purposes. 
They should not be regarded as investigational or for research. This laboratory 
is certified under the Clinical Laboratory Improvement Amendments (CLIA) as 
qualified to perform high complexity clinical laboratory testing.[facility] HospitalEMBEDDED IMAGESUNCH [facility] HospitalUpper 
Endoscopic Ultrasound (EUS) (10/09/2020  5:02 PM EDT)2020-10-09 17:02:29Upper 
Endoscopic Ultrasound (EUS) (10/09/2020 5:02 PM EDT)SpecimenNarrativePerformed 
At______________
_________________________________________________________________Patient Name: 
Leon Hagan       Procedure Date: 10/9/2020 5:02 PMMRN: [id number]
          YOB: 1970Admit Type: Inpatient  
      Age: 50Room: [facility]     Gender: 
MaleNote Status: Finalized        Instrument Name: 
[id number]___
____________________________________________________________________________ 
Procedure:       Upper EUSIndications:      Suspected mass
in pancreas on CT scanProviders:       MONICA LAKHANI MD, TRISTAN BLACK MD            (Fellow), MARIKA VERGARA MD:     FAVIAN MEYER DO 
(Referring MD)Medicines:       General AnesthesiaComplications:  
   No immediate complications. Estimated blood loss:        
    Minimal.___________________________________________
____________________________________Procedure:       Pre-Anesthesia 
Assessment:           - Prior to the procedure, a History 
and Physical was           performed, and patient 
medications and allergies were            reviewed. The 
patient's tolerance of previous            anesthesia was 
also reviewed. The risks and benefits            of the 
procedure and the sedation optionsand risks            
were discussed with the patient. All questions were         
 answered, and informed consent was obtained. Prior         
  Anticoagulants: The patient has taken no previous         
   anticoagulant or antiplatelet agents. ASA Grade        
   Assessment: III - A patientwith severe systemic        
   disease. After reviewing the risks and benefits, the       
     patient was deemed in satisfactory condition to      
     undergo the procedure.            After 
obtaining informed consent, the endoscope was            
passed under direct vision. Throughout the procedure,         
  the patient's blood pressure, pulse, and oxygen         
  saturations were monitored continuously. The          
 Endosonoscope was introduced through the mouth, and         
   advanced to the second part of duodenum. The upper EUS      
      was accomplished without difficulty. The patient     
       tolerated the procedure well.          
                          
 Findings:   ENDOSONOGRAPHIC FINDING: :   A round mass was 
identified in the pancreatic head.The mass was   hypoechoic. The mass 
measured 48 mm in maximal cross-sectional diameter.   The outer margins were
irregular. The remainder of the pancreas was   examined. The endosonographic
appearance of parenchyma and the upstream   pancreatic duct indicated duct 
dilation. Fine needlebiopsy was   performed. Color Doppler imaging was 
utilized prior to needle puncture   to confirm a lack of significant 
vascular structures within the needle   path. Two passes were made with the 
22 gauge IPG biopsy needle   using a transduodenal approach. Visible 
cores of tissue were obtained.   Final pathology results are pending.   
There was diffuse dilation in the common bile duct.   There was dilation 
diffusely throughout the intrahepatic bile duct(s).   Moderate hyperechoic 
material consistent with sludge was visualized   endosonographically in the 
gallbladder body.                     
                 Impression:      
- A mass was identified in the pancreatichead. Fine         
  needle biopsy performed.            - Extrahepatic 
and intrahepatic biliary dilation.            - 
Hyperechoic material consistent with sludge was           
visualized endosonographically in the gallbladder body.Recommendation:    
- Perform an ERCP today.            - Await path 
results.                        
              Procedure Code(s):   --- 
Professional ---            43856, 
Esophagogastroduodenoscopy, flexible,            tra
nsoral; with transendoscopic ultrasound-guided            
intramural or transmural fine needle            
aspiration/biopsy(s), (includes endoscopic ultrasound         
  examination limited to the esophagus, stomach or         
   duodenum, and adjacent structures)Diagnosis Code(s):   --- 
Professional ---            K86.89, Other specified 
diseases of pancreas            K83.8, Other specified 
diseases of biliary tract            R93.3, Abnormal 
findings on diagnostic imaging of            other parts 
of digestive tract CPT copyright 2019 American Medical Association. All rights 
reserved. The codes documented in this report are preliminary and upon  
review may be revised to meet current compliance requirements.Attending 
Participation:   I was present and participated during the entire procedure,
including   non-key portions.                
                       
Electronically Signed by Monica Lakhani MD___________________MONICA LAKHANI MD10/2020 7:25:40 PMThe attending physician was present throughout the entire 
procedural suite including insertion, viewing, and removal. 
___________________________TRISTAN BLACK MDNumber of Addenda: 0 Note 
Initiated On: 10/9/2020 5:02 [facility] RADProcedure NoteInterface, Rad Results In - 
10/09/2020 7:30 PM 
EDT____________________________________________________________________
___________ Patient Name: Leon Hagan Procedure Date: 10/9/2020 5:02 PM MRN: 
[id number] YOB: 1970 Admit Type: Inpatient Age: 50 [address]: [address] Gender: Male Note Status: Finalized Instrument Name: 
[id number] ___________________________________________________________
____________________ Procedure: Upper EUS Indications: Suspected mass in 
pancreas on CT scan Providers: MONICA LAKHANI MD, TRISTAN BLACK MD 
(Fellow), MARIKA VERGARA Referring MD: FAVIAN MEYER DO 
(Referring MD) Medicines: General Anesthesia Complications: No immediate 
complications. Estimated blood loss: Minimal. 
_______________________________________________________________________________ 
Procedure: Pre-Anesthesia Assessment: - Prior to the procedure, a History and 
Physical was performed, and patient medications and allergies were reviewed. The
patient's tolerance of previous anesthesia was also reviewed. The risks and 
benefits of the procedure and the sedation options and risks were discussed with
the patient. All questions were answered, and informed consent was obtained. 
Prior Anticoagulants: The patient has taken no previous anticoagulant or 
antiplatelet agents. ASA Grade Assessment: III - A patient with severe systemic 
disease. After reviewing the risks and benefits, the patient was deemed in 
satisfactory condition to undergo the procedure. After obtaining informed 
consent, the endoscope was passed under direct vision. Throughout the procedure,
thepatient's blood pressure, pulse, and oxygen saturations were monitored 
continuously. The Endosonoscope was introduced through the mouth, and advanced 
to the second part of duodenum. The upper EUS was accomplished without 
difficulty. The patient tolerated the procedure well. Findings: ENDOSONOGRAPHIC 
FINDING: : A round mass was identified in the pancreatic head. The mass was 
hypoechoic. The mass measured 48 mm in maximal cross-sectional diameter. The 
outer margins were irregular. The remainder of thepancreas was examined. The 
endosonographic appearance of parenchyma and the upstream pancreatic duct
indicated duct dilation. Fine needle biopsy was performed. Color Doppler imaging
was utilized prior to needle puncture to confirm a lack of significant vascular 
structures within the needle path. Two passes were made with the 22 gauge 
SharkCore biopsy needle using a transduodenal approach. Visible cores of tissue 
were obtained. Final pathology results are pending. There was diffuse dilation 
in the common bile duct. There was dilation diffusely throughout the 
intrahepatic bile duct(s). Moderate hyperechoic material consistent with sludge 
was visualized endosonographically in the gallbladder body. Impression: - A mass
was identified in the pancreatic head. Fine needle biopsy performed. - 
Extrahepatic and intrahepatic biliary dilation. - Hyperechoic material 
consistent with sludge was visualized endosonographically in the gallbladder 
body. Recommendation: - Perform an ERCP today. - Await path results. Procedure 
Code(s): --- Professional --- 64972, Esophagogastroduodenoscopy, flexible, 
transoral; with transendoscopic ultrasound-guided intramural or transmural fine 
needle aspiration/biopsy(s), (includes endoscopic ultrasound examination limited
to the esophagus, stomach or duodenum, and adjacent structures) Diagnosis 
Code(s): --- Professional --- K86.89, Other specified diseases of pancreas 
K83.8, Other specified diseases of biliary tract R93.3, Abnormal findings on 
diagnostic imaging of other parts of digestive tract CPT copyright 2019 American
Medical Association. All rights reserved. The codes documented in this report 
are preliminary and upon  review may be revised to meet current compliance 
requirements. Attending Participation: I was present and participated during the
entire procedure, including non-key portions. Electronically Signed by Monica Lakhani MD ___________________ MONICA LAKHANI MD 10/9/2020 7:25:40 PM The 
attending physician was present throughout the entire procedural suite including
insertion, viewing, and removal. ___________________________ TRISTAN BLACK MD Number of Addenda: 0 Note Initiated On: 10/9/2020 5:02 PMPerforming 
OrganizationAddressCity/State/ZIP CodePhone NumberEMC RADEMC [id number] JOSEFA [place] [id number] (10/09/2020  3:07 PM EDT)2020-10-09 15:07:17ERCP 
(10/09/2020 3:07 PM EDT)SpecimenNarrativePerformed 
At___________________________________________
____________________________________Patient Name: Leon Hagan       
Procedure Date: 10/9/2020 3:07 PMMRN: [id number]          
YOB: 1970Admit Type: Inpatient         Age: 
50[address]     Gender: MaleNote Status: Finalized 
       Instrument Name: [id number] 
[id number]______________
_________________________________________________________________ Procedure:  
     ERCPIndications:      Abnormal abdominal CT, Jaundice, 
Malignant tumor of            the head of 
pancreasProviders:       MONICA LAKHANI MD, TRISTAN SILVA MD (Fellow), MARIKA VERGARA MD:     FAVIAN MYEER DO 
(Referring MD)Medicines:       General AnesthesiaComplications:  
   No immediate complications. Estimated blood loss:       
    
Minimal.________________________________________________________________________

_______Procedure:       Pre-Anesthesia Assessment:      
     - Priorto the procedure, a History and Physical was     
       performed, and patientmedications and allergies were   
         reviewed. The patient's tolerance ofprevious   
        anesthesia was also reviewed. The risks and benefits  
         of the procedure and the sedation options and risks
           were discussed with the patient. All questions 
were            answered, and informed consent was 
obtained. Prior            Anticoagulants: The patient has
taken no previous            anticoagulant or antiplatelet
agents. ASA Grade            Assessment: III - A patient 
with severe systemic            disease. After reviewing 
the risks and benefits, the            patient was deemed 
in satisfactory condition to            undergo the p
rocedure.            After obtaining informed consent, the
scope was passed           under direct vision. Throughout
the procedure, the            patient's blood pressure, 
pulse, and oxygen            saturations were monitored 
continuously. The            Duodenoscope was introduced 
through the mouth, and            advanced to the duodenum
without successful            cannulation. The ERCP was 
accomplished without            difficulty. The patient 
tolerated the procedure well.            The Duodenoscope 
was introduced through the and            advanced to the 
duodenum. The ERCP was technically            difficult 
and complex due to challenging cannulation            
because of abnormal anatomy. The patient tolerated the        
  procedure well.                    
                    Findings:   No 
biopsies or other specimens were collected for this exam. The    film 
was normal. The scope was passed under direct vision through the   upper GI 
tract. Extrinsic compression on the duodenum was found in the   first 
portion of the duodenum, in the second portion of the duodenum and   in the 
area of the papilla by the pancreatic head mass. There was   associated 
diffuse severely congested, erythematous mucosa without   active bleeding 
and with no stigmata of bleeding was found in the entire   duodenum. The 
major papilla was difficult to locate due to pancreatic   tumor causing 
significant compression on the duodenum with mucosal   changes as described 
previously. The bile duct could not be cannulated   with the short-nosed 
traction sphincterotome and guidewire. A biliary   pre-cut sphincterotomy 
measuring 5 mm in length was made with a   monofilament sphincterotome using
ERBE electrocautery. The bile duct   still could not be cannulated despite 
the sphincterotomy. Minor bleeding   from the sphincterotomy occurred. 
Coagulation for hemostasis of bleeding   caused by the procedure in the 
ampulla using bipolar probe through the   ERCP scope was successful.   
                          
          Impression:      - Duodenal extrinsic 
compression with severely            congested, 
erythematous and friable mucosa was found            due 
to pancreatic tumor.            - A biliary sphincterotomy
was performed. Mild oozing            occurred. Hemostasis
of bleeding caused by the            procedure in the 
ampulla with bipolar cautery was           performed.  
          - Failed cannulation.          
  - No specimens collected.Recommendation:    - [facility].            - Clear liquid 
diet today.            - Continue present medications. 
Hold DVT ppx or            anticoagulation.     
      - Observe patient's clinical course.         
   - Would recommend EUS-guided hepaticogastrostomy and       
     possibly duodenal stent vs. EUS-guided         
  gastrojejunostomy given likely impending duodenal         
   obstruction.                   
                   Procedure Code(s):  
 --- Professional ---            25507, 
Esophagogastroduodenoscopy, flexible,           transoral;
diagnostic, including collection of           specimen(s) 
by brushing or washing, when performed            (sepa
rate procedure)Diagnosis Code(s):   --- Professional ---       
     K31.89, Other diseases of stomach and duodenum       
     R17, Unspecified jaundice            C25.0,
Malignant neoplasm of head of pancreas           K83.8, 
Other specified diseases of biliary tract            
R93.5, Abnormal findings on diagnostic imaging of           
 other abdominal regions, including retroperitoneum CPT copyright 2019 
American Medical Association. All rights reserved. The codes documented in this 
report are preliminary and upon  review may be revised to meet current comp
liance requirements.Attending Participation:   I was present and 
participated during the entire procedure, including   non-key portions.  
                          
           Electronically Signed by Monica Lakhani MD___________________MONICA LAKHANI MD10/2020 7:37:34 PMThe attending 
physician was present throughout the entire procedural suite including 
insertion, viewing, and removal. ___________________________TRISTAN BLACK MDNumber of Addenda: 0 Note Initiated On: 10/9/2020 3:07 [facility] 
RADProcedure NoteInterface, Rad Results In - 10/09/2020 7:42 PM 
EDT____________________________________________
___________________________________ Patient Name: Leon Hagan Procedure Date: 
10/9/2020 3:07 PM MRN:[id number] YOB: 1970 Admit Type: Inpatient
Age: 50 Room: [address] Gender: Male Note Status: Finalized 
Instrument Name: [id number] _________________
______________________________________________________________ Procedure: ERCP 
Indications: Abnormalabdominal CT, Jaundice, Malignant tumor of the head of 
pancreas Providers: MONICA LAKHANI MD, TRISTAN SILVA MD (Fellow), MARIKA VERGARA Referring MD: FAVIAN MEYER DO (Referring MD) Medicines: General Anesthesia Complications: No 
immediate complications. Estimated blood loss: Minimal. 
_______________________________________________________________________________ 
Procedure: Pre-Anesthesia Assessment: - Prior to the procedure, a History and 
Physical was performed, and patient medications and allergies were reviewed. The
patient's tolerance of previous anesthesia was also reviewed. The risks and 
benefits of the procedure and the sedation options and risks were discussed with
the patient. All questions were answered, and informed consent was obtained. 
Prior Anticoagulants: The patient has taken no previous anticoagulant or 
antiplatelet agents. ASA Grade Assessment: III - A patient with severe systemic 
disease. After reviewing the risks and benefits, the patient was deemed in 
satisfactory condition to undergo the procedure. After obtaining informed 
consent, the scope was passed under direct vision. Throughout the procedure, the
patient's bloodpressure, pulse, and oxygen saturations were monitored 
continuously. The Duodenoscope was introducedthrough the mouth, and advanced to 
the duodenum without successful cannulation. The ERCP was accomplished without 
difficulty. The patient tolerated the procedure well. The Duodenoscope was 
introduced through the and advanced to the duodenum. The ERCP was technically 
difficult and complex due to challenging cannulation because of abnormal 
anatomy. The patient tolerated the procedure well. Findings: Nobiopsies or other
specimens were collected for this exam. The  film was normal. The scope was
passed under direct vision through the upper GI tract. Extrinsic compression on 
the duodenum was foundin the first portion of the duodenum, in the second 
portion of the duodenum and in the area of the papilla by the pancreatic head 
mass. There was associated diffuse severely congested, erythematous mucosa 
without active bleeding and with no stigmata of bleeding was found in the entire
duodenum. The major papilla was difficult to locate due to pancreatic tumor 
causing significant compression on the duodenum with mucosal changes as 
described previously. The bile duct could not be cannulated with the short-nosed
traction sphincterotome and guidewire. A biliary pre-cut sphincterotomy 
measuring 5 mm in length was made with a monofilament sphincterotome using ERBE 
electrocautery. The bile duct still could not be cannulated despite the 
sphincterotomy. Minor bleeding from the sphincterotomy occurred. Coagulation for
hemostasis of bleeding caused by the procedure in the ampulla using bipolar 
probe through the ERCP scope was successful. Impression: - Duodenal extrinsic 
compression with severely congested, erythematous and friable mucosa was found 
due to pancreatic tumor. - A biliary sphincterotomy was performed. Mild oozing 
occurred. Hemostasis of bleeding caused by the procedure in the ampulla with bip
olar cautery was performed. - Failed cannulation. - No specimens collected. 
Recommendation: - [facility]patient [facility]. - Clear 
liquid diet today. - Continue present medications. Hold DVT ppx or 
anticoagulation. - Observe patient's clinical course. - Would recommend EUS-
guided hepaticogastrostomy and possibly duodenal stent vs. EUS-guided 
gastrojejunostomy given likely impending duodenal obstruction. Procedure 
Code(s): --- Professional --- 55947, Esophagogastroduodenoscopy, flexible, 
transoral; diagnostic, including collection of specimen(s) by brushing or 
washing, when performed (separate procedure) Diagnosis Code(s): --- Professional
--- K31.89, Other diseases of stomach and duodenum R17, Unspecified jaundice 
C25.0, Malignant neoplasm of head of pancreas K83.8, Other specified diseases of
biliary tract R93.5, Abnormal findings on diagnostic imaging of other abdominal 
regions, including retroperitoneum CPT copyright 2019 American Medical 
Association. All rights reserved. The codes documented in this report are 
preliminary and upon  review may be revised to meet current compliance 
requirements. Attending Participation: I was present and participated during the
entire procedure, including non-key portions. Electronically Signed by Monica Lakhani MD ___________________ MONICA LAKHANI MD 10/9/2020 7:37:34 PM The 
attending physician was present throughout the entire procedural suite including
insertion, viewing, and removal. ___________________________ TRISTAN BLACK MD Number of Addenda: 0 Note Initiated On: 10/9/2020 3:07 PMPerforming 
OrganizationAddressCity/State/ZIP CodePhone Number[facility] [place] [id number] [facility].[place], [place] [id number] Metabolic Panel (10/09/2020  3:59 AM EDT)
2020-10-09 03:59:00





                Test Item       Value           Reference Range Comments

 

                Sodium (test code = Sodium) 140 mmol/L      135 - 145 mmol/L 

 

                Potassium (test code = Potassium) 4.4 mmol/L      3.5 - 5.0 mmol

/L 

 

                Chloride (test code = Chloride) 105 mmol/L      98 - 107 mmol/L 

 

                Anion Gap (test code = Anion Gap) 13 mmol/L       7 - 15 mmol/L 

  

 

                CO2 (test code = CO2) 22.0 mmol/L     22.0 - 30.0 mmol/L 

 

                BUN (test code = BUN) 3 mg/dL         7 - 21 mg/dL    

 

                Creatinine (test code = Creatinine) 0.50 mg/dL      0.70 - 1.30 

mg/dL 

 

                BUN/Creatinine Ratio (test code = 6                             

  



                BUN/Creatinine Ratio)                                 

 

                EGFR CKD-EPI Non-, Male (test >90             >=

60 mL/min/1.73m2 



                code = EGFR CKD-EPI Non-,                       

          



                Male)                                           

 

                EGFR CKD-EPI , Male (test >90             >=60 m

L/min/1.73m2 



                code = EGFR CKD-EPI , Male)                     

            

 

                Glucose (test code = Glucose) 114 mg/dL       70 - 99 mg/dL   

 

                Calcium (test code = Calcium) 8.6 mg/dL       8.5 - 10.2 mg/dL 

 

                Albumin (test code = Albumin) 3.0 g/dL        3.5 - 5.0 g/dL  

 

                Total Protein (test code = Total Protein) 6.1 g/dL        6.5 - 

8.3 g/dL  

 

                Total Bilirubin (test code = Total Bilirubin) 11.4 mg/dL      0.

0 - 1.2 mg/dL 

 

                AST (test code = AST) 93 U/L          19 - 55 U/L     

 

                ALT (test code = ALT) 121 U/L         <50 U/L         

 

                Alkaline Phosphatase (test code = Alkaline 752 U/L         38 - 

126 U/L    



                Phosphatase)                                    



CBC (10/09/2020  3:59 AM EDT)2020-10-09 03:59:00





                Test Item       Value           Reference Range Comments

 

                WBC (test code = WBC) 16.0 10*9/L     4.5 - 11.0 10*9/L 

 

                RBC (test code = RBC) 4.27 10*12/L    4.50 - 5.90 10*12/L 

 

                HGB (test code = HGB) 12.0 g/dL       13.5 - 17.5 g/dL 

 

                HCT (test code = HCT) 37.8 %          41.0 - 53.0 %   

 

                MCV (test code = MCV) 88.5 fL         80.0 - 100.0 fL 

 

                MCH (test code = MCH) 28.1 pg         26.0 - 34.0 pg  

 

                MCHC (test code = MCHC) 31.8 g/dL       31.0 - 37.0 g/dL 

 

                RDW (test code = RDW) 16.2 %          12.0 - 15.0 %   

 

                MPV (test code = MPV) 10.6 fL         7.0 - 10.0 fL   

 

                Platelet (test code = Platelet) 364 10*9/L      150 - 440 10*9/L

 



Magnesium Level (10/09/2020  3:59 AM EDT)2020-10-09 03:59:00





                Test Item       Value           Reference Range Comments

 

                Magnesium (test code = Magnesium) 2.0 mg/dL       1.6 - 2.2 mg/d

L 



Phosphorus Level (10/09/2020  3:59 AM EDT)2020-10-09 03:59:00





                Test Item       Value           Reference Range Comments

 

                Phosphorus (test code = Phosphorus) 1.8 mg/dL       2.9 - 4.7 mg

/dL 



COVID-19 PCR (10/08/2020  6:29 PM EDT)2020-10-08 18:29:00





                Test Item       Value           Reference Range Comments

 

                SARS-CoV-2 PCR (test code = SARS-CoV-2 PCR) Not Detected    Not 

Detected    



Basic Metabolic Panel (10/08/2020  2:27 PM EDT)2020-10-08 14:27:00





                Test Item       Value           Reference Range Comments

 

                Sodium (test code = Sodium) 133 mmol/L      135 - 145 mmol/L 

 

                Potassium (test code = Potassium) 3.2 mmol/L      3.5 - 5.0 mmol

/L 

 

                Chloride (test code = Chloride) 103 mmol/L      98 - 107 mmol/L 

 

                CO2 (test code = CO2) 21.0 mmol/L     22.0 - 30.0 mmol/L 

 

                Anion Gap (test code = Anion Gap) 9 mmol/L        7 - 15 mmol/L 

  

 

                BUN (test code = BUN) 4 mg/dL         7 - 21 mg/dL    

 

                Creatinine (test code = Creatinine) 0.54 mg/dL      0.70 - 1.30 

mg/dL 

 

                BUN/Creatinine Ratio (test code = 7                             

  



                BUN/Creatinine Ratio)                                 

 

                EGFR CKD-EPI Non-, Male (test >90             >=

60 mL/min/1.73m2 



                code = EGFR CKD-EPI Non-,                       

          



                Male)                                           

 

                EGFR CKD-EPI , Male (test >90             >=60 m

L/min/1.73m2 



                code = EGFR CKD-EPI , Male)                     

            

 

                Glucose (test code = Glucose) 132 mg/dL       70 - 179 mg/dL  

 

                Calcium (test code = Calcium) 8.5 mg/dL       8.5 - 10.2 mg/dL 



Comprehensive Metabolic Panel (10/08/2020  3:40 AM EDT)2020-10-08 03:40:00





                Test Item       Value           Reference Range Comments

 

                Sodium (test code = Sodium) 139 mmol/L      135 - 145 mmol/L 

 

                Potassium (test code = Potassium) 2.8 mmol/L      3.5 - 5.0 mmol

/L 

 

                Chloride (test code = Chloride) 105 mmol/L      98 - 107 mmol/L 

 

                Anion Gap (test code = Anion Gap) 13 mmol/L       7 - 15 mmol/L 

  

 

                CO2 (test code = CO2) 21.0 mmol/L     22.0 - 30.0 mmol/L 

 

                BUN (test code = BUN) 5 mg/dL         7 - 21 mg/dL    

 

                Creatinine (test code = Creatinine) 0.57 mg/dL      0.70 - 1.30 

mg/dL 

 

                BUN/Creatinine Ratio (test code = 9                             

  



                BUN/Creatinine Ratio)                                 

 

                EGFR CKD-EPI Non-, Male (test >90             >=

60 mL/min/1.73m2 



                code = EGFR CKD-EPI Non-,                       

          



                Male)                                           

 

                EGFR CKD-EPI , Male (test >90             >=60 m

L/min/1.73m2 



                code = EGFR CKD-EPI , Male)                     

            

 

                Glucose (test code = Glucose) 97 mg/dL        70 - 179 mg/dL  

 

                Calcium (test code = Calcium) 8.4 mg/dL       8.5 - 10.2 mg/dL 

 

                Albumin (test code = Albumin) 3.0 g/dL        3.5 - 5.0 g/dL  

 

                Total Protein (test code = Total Protein) 6.2 g/dL        6.5 - 

8.3 g/dL  

 

                Total Bilirubin (test code = Total Bilirubin) 12.0 mg/dL      0.

0 - 1.2 mg/dL 

 

                AST (test code = AST) 79 U/L          19 - 55 U/L     

 

                ALT (test code = ALT) 120 U/L         <50 U/L         

 

                Alkaline Phosphatase (test code = Alkaline 755 U/L         38 - 

126 U/L    



                Phosphatase)                                    



CBC (10/08/2020  3:40 AM EDT)2020-10-08 03:40:00





                Test Item       Value           Reference Range Comments

 

                WBC (test code = WBC) 17.3 10*9/L     4.5 - 11.0 10*9/L 

 

                RBC (test code = RBC) 4.07 10*12/L    4.50 - 5.90 10*12/L 

 

                HGB (test code = HGB) 11.7 g/dL       13.5 - 17.5 g/dL 

 

                HCT (test code = HCT) 35.8 %          41.0 - 53.0 %   

 

                MCV (test code = MCV) 88.0 fL         80.0 - 100.0 fL 

 

                MCH (test code = MCH) 28.8 pg         26.0 - 34.0 pg  

 

                MCHC (test code = MCHC) 32.7 g/dL       31.0 - 37.0 g/dL 

 

                RDW (test code = RDW) 16.0 %          12.0 - 15.0 %   

 

                MPV (test code = MPV) 10.5 fL         7.0 - 10.0 fL   

 

                Platelet (test code = Platelet) 367 10*9/L      150 - 440 10*9/L

 



Magnesium Level (10/08/2020  3:40 AM EDT)2020-10-08 03:40:00





                Test Item       Value           Reference Range Comments

 

                Magnesium (test code = Magnesium) 1.8 mg/dL       1.6 - 2.2 mg/d

L 



Phosphorus Level (10/08/2020  3:40 AM EDT)2020-10-08 03:40:00





                Test Item       Value           Reference Range Comments

 

                Phosphorus (test code = Phosphorus) 3.1 mg/dL       2.9 - 4.7 mg

/dL 



Cancer Antigen 19-9 (10/08/2020  3:40 AM EDT)2020-10-08 03:40:00





                Test Item       Value           Reference Range Comments

 

                CA 19-9 (test code = CA 19-9) 1120 U/mL       0 - 36.9 U/mL   



CEA (10/08/2020  3:40 AM EDT)2020-10-08 03:40:00





                Test Item       Value           Reference Range Comments

 

                CEA (test code = CEA) 1.3 ng/mL       0.0 - 5.0 ng/mL 



CT Body Outside Film For Continued Care (10/08/2020  3:58 AM EDT)2020-10-08 
00:00:00CT Body Outside Film For Continued Care (10/08/2020 3:58 AM 
EDT)SpecimenNarrativePerformed AtThese images were imported for continued care 
purposes only. They will not be interpreted and no charges will 
apply.UNCHCSRADPerforming OrganizationAddressCity/State/ZIP CodePhone 
NumberUNCHCSRADUS Outside Film For Continued Care (10/08/2020  3:58 AM EDT)
2020-10-08 00:00:00[place] Outside Film For Continued Care (10/08/2020 3:58 AM 
EDT)SpecimenNarrativePerformed AtThese imageswere imported for continued care 
purposes only. They will not be interpreted and no charges will appl
y.UNCHCSRADPerforming OrganizationAddressCity/State/ZIP CodePhone 
NumberUNCHCSRAD



Encounters







        Start   End     Encounter Admission Attending Care    Care    Encounter



        Date/Time Date/Time Type    Type    Clinicians Facility Department ID

 

        2020-10-13         Inpatient [facility]     [id number]



        13:28:03                         KAROLINE                 [id number]



                                                                [phone]

 

        2020-10-12         Inpatient         [facility]OURT[facility]     [id number]

_



        00:00:00                         PAMELA                   [id number]

 

        2020-10-09         Inpatient         [facility]     [id number]

_



        00:00:00                         DIANA                   [id number]

 

        2020-10-07         Inpatient ALESHA [facility]  [facility] access hospital     [id number]



        00:00:00                         KAROLINE                 2020 [address] [place] [facility] 2

[id number]



        00:00:00 00:00:00 Sonja Chatterjee rn Medical Medical 



                                                Oncology Oncology 



                                                Center  [place]  

 

        2020 Outpatient                 [facility]ly [facility]

 [id number]



        00:00:00 00:00:00                         rn Medical Medical 



                                                Oncology Oncology 



                                                Center  [place]  

 

        2020 Outpatient                 [facility]

 [id number]



        00:00:00 00:00:00                         hattie [facility] 



                                                Oncology Oncology 



                                                Center  [place]  

 

        2020 Outpatient                 [facility]

 [id number]



        00:00:00 00:00:00                         [facility] 



                                                Oncology Oncology 



                                                Center  [place]  

 

        2020 Leon [facility] 2

[id number]



        00:00:00 00:00:00 Sonja Chatterjee rn Medical Medical 



                                                Oncology Oncology 



                                                Center  [place]  

 

        2020-10-26 2020-10-26 Outpatient         Eddy Quintero [facility]

n [id number]



        00:00:00 00:00:00                 Sen kuhn [facility] 



                                                Oncology Oncology 



                                                Center  [place]  

 

        2020-10-16 2020-10-16 Outpatient                 [facility]  [id number]

[id number]



        00:00:00 00:00:00                                         

 

        2020-10-08 2020-10-15 Inpatient                 [facility]  [id number]

[id number]



        02:48:00 18:07:00                                         

 

        2020-10-08 2020-10-15 Inpatient ALESHA PIERRE  [facility] access hospital     [id number]

00_



        02:48:00 18:07:00                 KAROLINE                 [id number]



                                                                [phone]

 

        2020-10-15 2020-10-15 Outpatient                 [facility]

 [id number]



        00:00:00 00:00:00                         [facility] 



                                                Oncology Oncology 



                                                Center  [place]  

 

        2020-10-15 2020-10-15 Outpatient                 [facility]  [facility]  [id number]

[id number]



        00:00:00 00:00:00                                         

 

        2020-10-09 2020-10-09 Inpatient AL[facility]HA PIERRE  [facility] access hospital     [id number]

00_



        15:03:03 23:59:00                 KAROLINE                 [id number]



                                                                [id number]

 

        2020-10-09 2020-10-09 Outpatient                 [facility]

 2020



        00:00:00 00:00:00                         rn Medical Medical 



                                                Oncology Oncology 



                                                Center  [place]  

 

        2020-10-08 2020-10-08 Inpatient ALESHA PIERRE  Critical access hospital     [id number]

00_



        03:55:15 23:59:00                 KAROLINE                 [id number]



                                                                515

 

        2020-10-08 2020-10-08 Inpatient ALESHA PIERRE  Critical access hospital     [id number]

00_



        03:54:23 23:59:00                 KAROLINE                 [id number]



                                                                [id number]

 

        2020-10-08 2020-10-08 Outpatient                 [facility]

 2020



        00:00:00 00:00:00                         rn Medical Medical 



                                                Oncology Oncology 



                                                Center  [place]  

 

        2020-10-07 2020-10-07 Outpatient                 [facility]

 2020



        00:00:00 00:00:00                         rn Medical Medical 



                                                Oncology Oncology 



                                                [facility]  







Immunizations







           Ordered Immunization Filled Immunization Date       Status     Commen

ts   Refusal Reason



           Name       Name                                        

 

           Influenza Vaccine            2020-10-15 Cancelled             



           Quad (IIV4 PF) 6mo+            00:00:00                         



           injectable                                             







Plan of Treatment







                Planned Activity Planned Date    Details         Comments

 

                Future Scheduled Test                  [code = ]      

 

                Future Scheduled Test                  [code = ]      

 

                Future Scheduled Test                  [code = ]      

 

                Future Scheduled Test                  [code = ]      

 

                Future Scheduled Test                  [code = ]      

 

                Future Scheduled Test                  [code = ]      

 

                Future Scheduled Test                  [code = ]      

 

                Future Scheduled Test                  [code = ]      

 

                Future Scheduled Test                  [code = ]      

 

                Future Scheduled Test                  [code = ]      







Social History







                Social Habit    Start Date      Stop Date       Comments

 

                Tobacco smoking status NHIS 2020-10-13 00:00:00 2020-10-13 00:00

:00 

 

                Tobacco use and exposure 2020-10-13 00:00:00 2020-10-13 00:00:00

 

 

                Alcohol intake  2020-10-13 00:00:00 2020-10-13 00:00:00 

 

                History SDOH Food Worry 2020-10-08 00:00:00 2020-10-08 00:00:00 

 

                History SDOH Food Scarcity 2020-10-08 00:00:00 2020-10-08 00:00:

00 









                    Smoking Status      Start Date          Stop Date

 

                    Never                                   2020 16:36:50









                          Social History Observation Description

 

                          Birth Sex                 Male







Vital Signs







                Vital Name      Observation Time Observation Value Comments

 

                BMI             2020 15:35:16 19.9500         

 

                BP oliva         2020 15:35:16 49.0000 mm[Hg]  

 

                Bdy height      2020 15:35:16 67.3200 [in_i]  

 

                SaO2% BldA PulseOx 2020 15:35:16 92.0000 %       

 

                Heart rate      2020 15:35:16 87.0000 /min    

 

                Resp rate       2020 15:35:16 16.0000 /min    

 

                BP sys          2020 15:35:16 72.0000 mm[Hg]  

 

                Body temperature 2020 15:35:16 97.3000 [degF]  

 

                Weight          2020 15:35:16 128.6000 [lb_av] 

 

                BP oliva         2020 15:52:46 59.0000 mm[Hg]  

 

                Bdy height      2020 15:52:46 67.3200 [in_i]  

 

                SaO2% BldA PulseOx 2020 15:52:46 97.0000 %       

 

                Heart rate      2020 15:52:46 105.0000 /min   

 

                Resp rate       2020 15:52:46 22.0000 /min    

 

                BP sys          2020 15:52:46 119.0000 mm[Hg] 

 

                Body temperature 2020 15:52:46 97.6000 [degF]  

 

                Weight          2020 15:52:46 132.0000 [lb_av] 

 

                BMI             2020 15:52:46 20.4800         

 

                Systolic blood pressure 2020-10-15 11:41:00 95 mm[Hg]       

 

                Diastolic blood pressure 2020-10-15 11:41:00 58 mm[Hg]       

 

                Heart rate      2020-10-15 11:41:00 86 /min         

 

                Body temperature 2020-10-15 11:41:00 36.72 Kaelyn       

 

                Respiratory rate 2020-10-15 11:41:00 20 /min         

 

                Oxygen saturation in Arterial blood by 2020-10-15 11:41:00 99 % 

           



                Pulse oximetry                                  

 

                Body height     2020-10-08 02:45:00 170.2 cm        

 

                Body weight     2020-10-08 02:45:00 62.551 kg

## 2020-11-16 NOTE — PDOC H&P
History of Present Illness


Patient complains of: Nausea, vomiting, weakness


History of Present Illness: 


LEON WARNER is a 50 year old male with history of pancreatic cancer with 

metastasis to the liver, who presents to the hospital for evaluation of nausea, 

vomiting and generalized body weakness.  This has been a recurrent issue for 

patient.  He presented with similar issues a few weeks back.  He states his 

nausea vomiting picked up again several days ago.  His wife who is at bedside 

acknowledged that he has not been eating or drinking much of anything.  He 

continued to feel significantly dehydrated and today he felt very weak and 

decided to come to the hospital.  Had episode of vomiting as well while in the 

ER.  He was noted to be hypotensive as well.  He also has significant 

leukocytosis of 30,000.  He denies any cough, diarrhea, skin wounds, fevers or 

chills.  He does endorse dysuria.  He states he had a Port-A-Cath placed about 1

week ago and was meant to start chemotherapy with Dr. Quintero today.








Past Medical History


Cardiac Medical History: 


   Denies: Congestive Heart Failure, Myocardial Infarction, Hypertension


Pulmonary Medical History: 


   Denies: Asthma, Bronchitis, Chronic Obstructive Pulmonary Disease (COPD), 

Pneumonia, Tuberculosis


Neurological Medical History: 


   Denies: Seizures


Renal/ Medical History: 


   Denies: End Stage Renal Disease


Malignancy Medical History: Reports: Pancreatic Cancer - With metastasis to the 

liver


GI Medical History: 


   Denies: Cirrhosis, Gastroesophageal Reflux Disease


Musculoskeltal Medical History: 


   Denies: Arthritis


Psychiatric Medical History: 


   Denies: Bipolar Disorder, Depression


Hematology: 


   Denies: Anemia, Bleeding Tendencies





Past Surgical History


Past Surgical History: Reports: Other - Pigtail catheter from common bile duct 

to stomach.





Social History


Lives with: Family


Smoking Status: Never Smoker


Electronic Cigarette use?: No


Frequency of Alcohol Use: None


Hx Recreational Drug Use: No


Drugs: None


Hx Prescription Drug Abuse: No





- Advance Directive


Resuscitation Status: Full Code





Family History


Family History: Reviewed & Not Pertinent, Other - Significant for pancreatic 

cancer in grandmother.  denies: CAD


Parental Family History Reviewed: Yes


Children Family History Reviewed: Yes


Sibling(s) Family History Reviewed.: Yes





Medication/Allergy


Home Medications: 








Alprazolam [Xanax 0.5 mg Tablet] 0.5 mg PO DAILY 11/05/20 


Fluoxetine HCl [Prozac] 40 mg PO DAILY 11/05/20 


Ondansetron [Zofran Odt 4 mg Tablet] 4 mg PO Q8HP PRN 11/05/20 


Oxycodone HCl [Oxy-Ir 5 mg Tablet] 5 mg PO Q6HP PRN 11/05/20 


Pantoprazole Sodium [Protonix 40 mg Dr Tablet] 40 mg PO DAILY 11/05/20 


Sucralfate [Carafate 1 gm Tablet] 1 gm PO ACHS 11/05/20 


Acetaminophen [Tylenol 325 mg Tablet] 650 mg PO Q4HP PRN  tablet 11/07/20 








Allergies/Adverse Reactions: 


                                        





No Known Allergies Allergy (Verified 11/05/20 08:58)


   











Review of Systems


Constitutional: PRESENT: fatigue.  ABSENT: chills, fever(s)


Eyes: ABSENT: visual disturbances


Ears: ABSENT: hearing changes


Nose, Mouth, and Throat: ABSENT: headache(s)


Cardiovascular: ABSENT: chest pain


Respiratory: ABSENT: cough, dyspnea


Gastrointestinal: PRESENT: abdominal pain, nausea, vomiting.  ABSENT: diarrhea


Genitourinary: PRESENT: dysuria


Musculoskeletal: ABSENT: joint swelling


Integumentary: ABSENT: diaphoresis


Neurological: PRESENT: weakness.  ABSENT: confusion, focal weakness


Psychiatric: PRESENT: anxiety


Endocrine: ABSENT: polyuria


Hematologic/Lymphatic: ABSENT: easy bleeding





Physical Exam


Vital Signs: 


                                        











Temp Pulse Resp BP Pulse Ox


 


 97.5 F   132 H  25 H  85/55 L  96 


 


 11/16/20 01:39  11/15/20 22:36  11/16/20 01:23  11/16/20 01:23  11/16/20 01:23








                                 Intake & Output











 11/14/20 11/15/20 11/16/20





 06:59 06:59 06:59


 


Intake Total   2000


 


Balance   2000


 


Weight   58.06 kg











General appearance: PRESENT: no acute distress, cooperative


Head exam: PRESENT: normocephalic


Eye exam: PRESENT: EOMI


Mouth exam: PRESENT: neck supple


Neck exam: ABSENT: JVD


Respiratory exam: PRESENT: clear to auscultation destiny, symmetrical, unlabored.  

ABSENT: tachypnea, wheezes


Cardiovascular exam: PRESENT: RRR, +S1, +S2.  ABSENT: tachycardia


GI/Abdominal exam: PRESENT: distended, soft, tenderness.  ABSENT: rebound, rigid


Extremities exam: ABSENT: calf tenderness


Neurological exam: PRESENT: alert, awake, oriented to person, oriented to place,

oriented to time


Psychiatric exam: ABSENT: agitated, anxious


Focused psych exam: ABSENT: pressured speech


Skin exam: ABSENT: skin tears





Results


Laboratory Results: 


                                        





                                 11/15/20 22:52 





                                 11/15/20 22:52 





                                        











  11/15/20 11/15/20 11/16/20





  22:52 22:52 02:31


 


WBC  30.3 H*  


 


RBC  3.53 L  


 


Hgb  9.6 L  


 


Hct  29.7 L  


 


MCV  84  


 


MCH  27.3  


 


MCHC  32.4  


 


RDW  16.9 H  


 


Plt Count  426  


 


Seg Neutrophils %  Not Reportable  


 


Sodium   134.3 L 


 


Potassium   3.6 


 


Chloride   101 


 


Carbon Dioxide   21 L 


 


Anion Gap   12 


 


BUN   23 H 


 


Creatinine   1.26 H 


 


Est GFR ( Amer)   > 60 


 


Glucose   126 H 


 


Lactic Acid    0.8


 


Calcium   8.9 


 


Magnesium   2.2 


 


Total Bilirubin   1.6 H 


 


AST   31 


 


Alkaline Phosphatase   473 H 


 


Total Protein   6.6 


 


Albumin   2.8 L 


 


Lipase   201.4 


 


Urine Color   


 


Urine Appearance   


 


Urine pH   


 


Ur Specific Gravity   


 


Urine Protein   


 


Urine Glucose (UA)   


 


Urine Ketones   


 


Urine Blood   


 


Urine Nitrite   


 


Ur Leukocyte Esterase   


 


Urine WBC (Auto)   


 


Urine RBC (Auto)   














  11/16/20





  02:35


 


WBC 


 


RBC 


 


Hgb 


 


Hct 


 


MCV 


 


MCH 


 


MCHC 


 


RDW 


 


Plt Count 


 


Seg Neutrophils % 


 


Sodium 


 


Potassium 


 


Chloride 


 


Carbon Dioxide 


 


Anion Gap 


 


BUN 


 


Creatinine 


 


Est GFR (African Amer) 


 


Glucose 


 


Lactic Acid 


 


Calcium 


 


Magnesium 


 


Total Bilirubin 


 


AST 


 


Alkaline Phosphatase 


 


Total Protein 


 


Albumin 


 


Lipase 


 


Urine Color  YELLOW


 


Urine Appearance  CLEAR


 


Urine pH  6.0


 


Ur Specific Gravity  1.039


 


Urine Protein  NEGATIVE


 


Urine Glucose (UA)  NEGATIVE


 


Urine Ketones  NEGATIVE


 


Urine Blood  NEGATIVE


 


Urine Nitrite  NEGATIVE


 


Ur Leukocyte Esterase  NEGATIVE


 


Urine WBC (Auto)  4


 


Urine RBC (Auto)  1








                                        











  11/15/20





  22:52


 


Troponin I  < 0.012











Impressions: 


                                        





Abdomen/Pelvis CT  11/15/20 22:35


IMPRESSION:


 


1.  Stable pancreatic head mass and multiple hepatic metastases. 


Biliary drain and pneumobilia are also stable from the prior CT.


 


2.  Mild free fluid within the abdomen and pelvis, increased from


the prior study.


 


3.  Tiny bilateral pleural effusions and mild basilar atelectasis


or infiltration.


 


 


 


TECHNICAL DOCUMENTATION:


 


Quality ID # [id number]: Final reports with documentation of one or more


dose reduction techniques (e.g., Automated exposure control,


adjustment of the mA and/or kV according to patient size, use of


iterative reconstruction technique)


 


copyright 2011 [employer]- All Rights Reserved


 














Assessment and Plan





- Diagnosis


(1) Hypotension


Qualifiers: 


   Hypotension type: hypotension due to hypovolemia   Qualified Code(s): I95.89 

- Other hypotension; E86.1 - Hypovolemia   


Is this a current diagnosis for this admission?: Yes   


Plan: 


Hypotension is likely due to hypovolemia as patient has barely had any p.o. 

intake in the past few days.


Received 2 L normal saline bolus


We will continue continuous infusion of normal saline


Monitor vital signs closely.








(2) GIUSEPPE (acute kidney injury)


Is this a current diagnosis for this admission?: Yes   


Plan: 


Likely prerenal due to hypovolemia.  Monitor creatinine response to IV fluid 

administration.








(3) Leukocytosis


Qualifiers: 


   Leukocytosis type: unspecified   Qualified Code(s): D72.829 - Elevated white 

blood cell count, unspecified   


Is this a current diagnosis for this admission?: Yes   


Plan: 


Leukocytosis of 30,000.  Reviewed chest x-ray which does not seem to be imp

ressive for any pneumonia.  Urinalysis is negative.  Abdominal CT is simply 

shows findings of pancreatic cancer with liver mets.


Blood cultures obtained.


Received vancomycin and cefepime in the ER


No clear source of infection has been identified but given patient's 

presentation and immunocompromise state, I will continue patient on empiric 

antibiotics





Patient's oncologist has been consulted and will appreciate any recommendations 

as to whether there is suspicion that patient leukocytosis is of noninfectious 

etiology from malignancy.








(4) Malnutrition


Qualifiers: 


   Malnutrition type: protein-calorie malnutrition   Protein-calorie malnutri

tion severity: moderate   Qualified Code(s): E44.0 - Moderate protein-calorie 

malnutrition   


Is this a current diagnosis for this admission?: Yes   


Plan: 


Dietitian consulted








(5) Nausea & vomiting


Qualifiers: 


 


Is this a current diagnosis for this admission?: Yes   


Plan: 


Phenergan as needed








(6) Pancreatic cancer metastasized to liver


Is this a current diagnosis for this admission?: Yes   


Plan: 


Port cath just placed about a weeks ago.  Patient planned for chemotherapy.  On

cologist has been consulted.  Continue oxycodone for patient's neoplasm related 

abdominal pain.  Bowel regimen.








- Time


Time Spent with patient: 35 or more minutes


Anticipated Discharge Disposition: Home, Self Care


Anticipated Discharge Timeframe: within 48 hours

## 2020-11-16 NOTE — RADIOLOGY REPORT (SQ)
EXAM DESCRIPTION:  VENOUS UNILATERAL UPPER



IMAGES COMPLETED DATE/TIME:  11/16/2020 11:09 am



REASON FOR STUDY:  dvt left upper



COMPARISON:  None.



TECHNIQUE:  Dynamic and static gray scale and color images acquired of the left arm venous system. Se
lected spectral images acquired with additional compression and augmentation maneuvers. The contralat
eral subclavian vein and internal jugular vein were also imaged. Images stored on PACS.



LIMITATIONS:  None.



FINDINGS:  INTERNAL JUGULAR VEIN: Normal phasicity, compression, augmentation. No visualized echogeni
c material on gray scale. No defects on color images. Comparison opposite side normal.

SUBCLAVIAN VEIN:  Occlusive thrombus within the distal left subclavian vein.  Non compressible segmen
t of the vessel.

AXILLARY VEIN:  Occlusive thrombus within the left axillary vein which is non-compressible.

BRACHIAL VEIN:  Non-compressible brachial veins with occlusive thrombus identified.

BASILIC VEIN:  Non-compressible vein with occlusive thrombus identified.

CEPHALIC VEIN: Normal compression, augmentation. No visualized echogenic material on gray scale. No d
efects on color images.

OTHER: No other significant finding.

CONTRALATERAL SUBCLAVIAN VEIN AND INTERNAL JUGULAR VEIN:

Normal phasicity, compression and augmentation. No visualized echogenic material on gray scale. No de
fects on color images.



IMPRESSION:  1.  POSITIVE EXAM. EVIDENCE OF DVT AND SVT LEFT ARM AS ABOVE.



COMMENT:  1.  The results of this examination were relayed to the patient's provider on 11/16/2020 an
d 08:11 hours.



TECHNICAL DOCUMENTATION:  JOB ID:  [id number]

 2011 [employer]- All Rights Reserved



Reading location - IP/workstation name: [email or web address]

## 2020-11-17 LAB
ABSOLUTE LYMPHOCYTES# (MANUAL): 0.7 10^3/UL (ref 0.5–4.7)
ABSOLUTE MONOCYTES # (MANUAL): 3.9 10^3/UL (ref 0.1–1.4)
ADD MANUAL DIFF: YES
ALBUMIN SERPL-MCNC: 2.2 G/DL (ref 3.5–5)
ALP SERPL-CCNC: 298 U/L (ref 38–126)
ANION GAP SERPL CALC-SCNC: 9 MMOL/L (ref 5–19)
ANISOCYTOSIS BLD QL SMEAR: (no result)
AST SERPL-CCNC: 30 U/L (ref 17–59)
BASOPHILS NFR BLD MANUAL: 0 % (ref 0–2)
BILIRUB DIRECT SERPL-MCNC: 1 MG/DL (ref 0–0.4)
BILIRUB SERPL-MCNC: 1.6 MG/DL (ref 0.2–1.3)
BUN SERPL-MCNC: 10 MG/DL (ref 7–20)
CALCIUM: 8.1 MG/DL (ref 8.4–10.2)
CHLORIDE SERPL-SCNC: 105 MMOL/L (ref 98–107)
CO2 SERPL-SCNC: 19 MMOL/L (ref 22–30)
EOSINOPHIL NFR BLD MANUAL: 1 % (ref 0–6)
ERYTHROCYTE [DISTWIDTH] IN BLOOD BY AUTOMATED COUNT: 17.1 % (ref 11.5–14)
GLUCOSE SERPL-MCNC: 108 MG/DL (ref 75–110)
HCT VFR BLD CALC: 25.8 % (ref 37.9–51)
HGB BLD-MCNC: 8.6 G/DL (ref 13.5–17)
MCH RBC QN AUTO: 27.5 PG (ref 27–33.4)
MCHC RBC AUTO-ENTMCNC: 33.1 G/DL (ref 32–36)
MCV RBC AUTO: 83 FL (ref 80–97)
MONOCYTES % (MANUAL): 11 % (ref 3–13)
PHOSPHATE SERPL-MCNC: 2.5 MG/DL (ref 2.5–4.5)
PLATELET # BLD: 364 10^3/UL (ref 150–450)
PLATELET CLUMP BLD QL SMEAR: PRESENT
PLATELET COMMENT: ADEQUATE
POTASSIUM SERPL-SCNC: 3.6 MMOL/L (ref 3.6–5)
PROT SERPL-MCNC: 5.4 G/DL (ref 6.3–8.2)
RBC # BLD AUTO: 3.11 10^6/UL (ref 4.35–5.55)
SEGMENTED NEUTROPHILS % (MAN): 86 % (ref 42–78)
TOTAL CELLS COUNTED BLD: 100
TOXIC GRANULES BLD QL SMEAR: (no result)
VARIANT LYMPHS NFR BLD MANUAL: 2 % (ref 13–45)
WBC # BLD AUTO: 35.2 10^3/UL (ref 4–10.5)
WBC TOXIC VACUOLES BLD QL SMEAR: PRESENT

## 2020-11-17 RX ADMIN — SODIUM CHLORIDE, SODIUM LACTATE, POTASSIUM CHLORIDE, AND CALCIUM CHLORIDE PRN MLS/HR: .6; .31; .03; .02 INJECTION, SOLUTION INTRAVENOUS at 04:15

## 2020-11-17 RX ADMIN — ALPRAZOLAM SCH MG: 0.5 TABLET ORAL at 09:45

## 2020-11-17 RX ADMIN — OXYCODONE HYDROCHLORIDE PRN MG: 5 TABLET ORAL at 17:36

## 2020-11-17 RX ADMIN — Medication SCH MLS/HR: at 17:35

## 2020-11-17 RX ADMIN — OXYCODONE HYDROCHLORIDE PRN MG: 5 TABLET ORAL at 09:45

## 2020-11-17 RX ADMIN — ENOXAPARIN SODIUM SCH MG: 60 INJECTION SUBCUTANEOUS at 09:45

## 2020-11-17 RX ADMIN — SUCRALFATE SCH GM: 1 TABLET ORAL at 22:30

## 2020-11-17 RX ADMIN — DOCUSATE SODIUM SCH MG: 100 CAPSULE, LIQUID FILLED ORAL at 09:45

## 2020-11-17 RX ADMIN — CEFEPIME HYDROCHLORIDE SCH MLS/HR: 1 INJECTION, SOLUTION INTRAVENOUS at 09:46

## 2020-11-17 RX ADMIN — ENOXAPARIN SODIUM SCH MG: 60 INJECTION SUBCUTANEOUS at 22:31

## 2020-11-17 RX ADMIN — SUCRALFATE SCH GM: 1 TABLET ORAL at 17:36

## 2020-11-17 RX ADMIN — SODIUM CHLORIDE, SODIUM LACTATE, POTASSIUM CHLORIDE, AND CALCIUM CHLORIDE PRN MLS/HR: .6; .31; .03; .02 INJECTION, SOLUTION INTRAVENOUS at 22:31

## 2020-11-17 RX ADMIN — CEFEPIME HYDROCHLORIDE SCH MLS/HR: 1 INJECTION, SOLUTION INTRAVENOUS at 22:30

## 2020-11-17 RX ADMIN — PANTOPRAZOLE SODIUM SCH MG: 40 TABLET, DELAYED RELEASE ORAL at 05:32

## 2020-11-17 RX ADMIN — FLUOXETINE SCH MG: 20 CAPSULE ORAL at 09:45

## 2020-11-17 RX ADMIN — PROMETHAZINE HYDROCHLORIDE PRN MG: 25 INJECTION INTRAMUSCULAR; INTRAVENOUS at 06:40

## 2020-11-17 RX ADMIN — Medication SCH MLS/HR: at 05:32

## 2020-11-17 RX ADMIN — ALPRAZOLAM SCH MG: 0.5 TABLET ORAL at 22:30

## 2020-11-17 NOTE — CDI QUERY
CDI Query


CDI Review: 





We are seeking further clarification of documentation to reflect the severity of

illness of your patient.





Per H&P:


LEON WARNER is a 50 year old male with history of pancreatic cancer with 

metastasis to the liver, who presents to the hospital for evaluation of nausea, 

vomiting and generalized body weakness.  . .  He states he had a Port-A-Cath 

placed about 1 week ago and was meant to start chemotherapy with Dr. Quintero 

today.





Per Progress Notes:


Ultrasound showed acute thrombus in the left axillary vein, distal left 

subclavian vein and basilic vein.  The patient is on therapeutic Lovenox.





Based on your medical judgement, can you further clarify in the Progress Notes 

if the acute thrombi are related to the Port-A-Cath:





   Acute thrombus in the left axillary vein, distal left subclavian vein and 

basilic vein (likely, possible, probably) secondary to Port-A-Cath





   Acute thrombus in the left axillary vein, distal left subclavian vein and 

basilic vein not secondary to Port-A-Cath





   Unable to determine





   Other





Thank you for your consideration.





MEREDITH Marcum RN


Clinical 


Physician Advisor


[phone]


[email or web address]

## 2020-11-18 LAB
ERYTHROCYTE [DISTWIDTH] IN BLOOD BY AUTOMATED COUNT: 16.9 % (ref 11.5–14)
HCT VFR BLD CALC: 26.1 % (ref 37.9–51)
HGB BLD-MCNC: 8.4 G/DL (ref 13.5–17)
MCH RBC QN AUTO: 26.8 PG (ref 27–33.4)
MCHC RBC AUTO-ENTMCNC: 32.1 G/DL (ref 32–36)
MCV RBC AUTO: 84 FL (ref 80–97)
PLATELET # BLD: 359 10^3/UL (ref 150–450)
RBC # BLD AUTO: 3.13 10^6/UL (ref 4.35–5.55)
VANCOMYCIN,TROUGH: 5.3 UG/ML (ref 5–20)
WBC # BLD AUTO: 35.7 10^3/UL (ref 4–10.5)

## 2020-11-18 RX ADMIN — OXYCODONE HYDROCHLORIDE PRN MG: 5 TABLET ORAL at 22:19

## 2020-11-18 RX ADMIN — ALPRAZOLAM SCH MG: 0.5 TABLET ORAL at 22:18

## 2020-11-18 RX ADMIN — SUCRALFATE SCH GM: 1 TABLET ORAL at 23:36

## 2020-11-18 RX ADMIN — FLUOXETINE SCH MG: 20 CAPSULE ORAL at 09:59

## 2020-11-18 RX ADMIN — SUCRALFATE SCH GM: 1 TABLET ORAL at 11:58

## 2020-11-18 RX ADMIN — ALPRAZOLAM SCH MG: 0.5 TABLET ORAL at 09:59

## 2020-11-18 RX ADMIN — OXYCODONE HYDROCHLORIDE PRN MG: 5 TABLET ORAL at 16:07

## 2020-11-18 RX ADMIN — PANTOPRAZOLE SODIUM SCH MG: 40 TABLET, DELAYED RELEASE ORAL at 05:13

## 2020-11-18 RX ADMIN — PROMETHAZINE HYDROCHLORIDE PRN MG: 25 INJECTION INTRAMUSCULAR; INTRAVENOUS at 16:59

## 2020-11-18 RX ADMIN — Medication SCH MLS/HR: at 05:12

## 2020-11-18 RX ADMIN — LUBIPROSTONE SCH MCG: 24 CAPSULE, GELATIN COATED ORAL at 09:59

## 2020-11-18 RX ADMIN — SODIUM CHLORIDE, SODIUM LACTATE, POTASSIUM CHLORIDE, AND CALCIUM CHLORIDE PRN MLS/HR: .6; .31; .03; .02 INJECTION, SOLUTION INTRAVENOUS at 04:43

## 2020-11-18 RX ADMIN — DOCUSATE SODIUM SCH MG: 100 CAPSULE, LIQUID FILLED ORAL at 09:59

## 2020-11-18 RX ADMIN — Medication PRN MG: at 22:19

## 2020-11-18 RX ADMIN — SUCRALFATE SCH GM: 1 TABLET ORAL at 15:32

## 2020-11-18 RX ADMIN — ENOXAPARIN SODIUM SCH MG: 60 INJECTION SUBCUTANEOUS at 22:18

## 2020-11-18 RX ADMIN — SUCRALFATE SCH GM: 1 TABLET ORAL at 09:58

## 2020-11-18 RX ADMIN — ENOXAPARIN SODIUM SCH MG: 60 INJECTION SUBCUTANEOUS at 09:59

## 2020-11-18 RX ADMIN — Medication SCH MLS/HR: at 17:56

## 2020-11-19 LAB
ABSOLUTE LYMPHOCYTES# (MANUAL): 0.4 10^3/UL (ref 0.5–4.7)
ABSOLUTE MONOCYTES # (MANUAL): 0 10^3/UL (ref 0.1–1.4)
ADD MANUAL DIFF: YES
ALBUMIN SERPL-MCNC: 2 G/DL (ref 3.5–5)
ALP SERPL-CCNC: 184 U/L (ref 38–126)
ANION GAP SERPL CALC-SCNC: 7 MMOL/L (ref 5–19)
AST SERPL-CCNC: 20 U/L (ref 17–59)
BASOPHILS NFR BLD MANUAL: 0 % (ref 0–2)
BILIRUB DIRECT SERPL-MCNC: 0.4 MG/DL (ref 0–0.4)
BILIRUB SERPL-MCNC: 0.8 MG/DL (ref 0.2–1.3)
BUN SERPL-MCNC: 22 MG/DL (ref 7–20)
CALCIUM: 8.1 MG/DL (ref 8.4–10.2)
CHLORIDE SERPL-SCNC: 105 MMOL/L (ref 98–107)
CO2 SERPL-SCNC: 22 MMOL/L (ref 22–30)
EOSINOPHIL NFR BLD MANUAL: 2 % (ref 0–6)
ERYTHROCYTE [DISTWIDTH] IN BLOOD BY AUTOMATED COUNT: 16.8 % (ref 11.5–14)
GLUCOSE SERPL-MCNC: 124 MG/DL (ref 75–110)
HCT VFR BLD CALC: 25.6 % (ref 37.9–51)
HGB BLD-MCNC: 8.3 G/DL (ref 13.5–17)
MCH RBC QN AUTO: 27.3 PG (ref 27–33.4)
MCHC RBC AUTO-ENTMCNC: 32.4 G/DL (ref 32–36)
MCV RBC AUTO: 84 FL (ref 80–97)
MONOCYTES % (MANUAL): 0 % (ref 3–13)
NEUTS BAND NFR BLD MANUAL: 13 % (ref 3–5)
PATH REV BLD -IMP: (no result)
PHOSPHATE SERPL-MCNC: 4.9 MG/DL (ref 2.5–4.5)
PLATELET # BLD: 331 10^3/UL (ref 150–450)
POTASSIUM SERPL-SCNC: 4.2 MMOL/L (ref 3.6–5)
PREALB SERPL-MCNC: 3.9 MG/DL (ref 17.6–36)
PROT SERPL-MCNC: 4.9 G/DL (ref 6.3–8.2)
RBC # BLD AUTO: 3.05 10^6/UL (ref 4.35–5.55)
SEGMENTED NEUTROPHILS % (MAN): 84 % (ref 42–78)
TOTAL CELLS COUNTED BLD: 100
VARIANT LYMPHS NFR BLD MANUAL: 1 % (ref 13–45)
WBC # BLD AUTO: 38.2 10^3/UL (ref 4–10.5)

## 2020-11-19 RX ADMIN — SUCRALFATE SCH GM: 1 TABLET ORAL at 08:24

## 2020-11-19 RX ADMIN — PANTOPRAZOLE SODIUM SCH MG: 40 TABLET, DELAYED RELEASE ORAL at 06:14

## 2020-11-19 RX ADMIN — VANCOMYCIN HYDROCHLORIDE SCH MLS/HR: 1 INJECTION, POWDER, LYOPHILIZED, FOR SOLUTION INTRAVENOUS at 06:13

## 2020-11-19 RX ADMIN — ENOXAPARIN SODIUM SCH MG: 60 INJECTION SUBCUTANEOUS at 09:49

## 2020-11-19 RX ADMIN — SUCRALFATE SCH: 1 TABLET ORAL at 17:42

## 2020-11-19 RX ADMIN — SUCRALFATE SCH GM: 1 TABLET ORAL at 12:40

## 2020-11-19 RX ADMIN — PROMETHAZINE HYDROCHLORIDE PRN MG: 25 INJECTION INTRAMUSCULAR; INTRAVENOUS at 00:05

## 2020-11-19 RX ADMIN — LUBIPROSTONE SCH MCG: 24 CAPSULE, GELATIN COATED ORAL at 09:47

## 2020-11-19 RX ADMIN — FLUOXETINE SCH MG: 20 CAPSULE ORAL at 09:47

## 2020-11-19 RX ADMIN — Medication PRN MG: at 22:23

## 2020-11-19 RX ADMIN — DOCUSATE SODIUM SCH MG: 100 CAPSULE, LIQUID FILLED ORAL at 09:46

## 2020-11-19 RX ADMIN — ENOXAPARIN SODIUM SCH MG: 60 INJECTION SUBCUTANEOUS at 22:23

## 2020-11-19 RX ADMIN — SUCRALFATE SCH GM: 1 TABLET ORAL at 22:23

## 2020-11-19 RX ADMIN — ALPRAZOLAM SCH MG: 0.5 TABLET ORAL at 09:46

## 2020-11-19 RX ADMIN — PROMETHAZINE HYDROCHLORIDE PRN MG: 25 INJECTION INTRAMUSCULAR; INTRAVENOUS at 17:41

## 2020-11-19 RX ADMIN — VANCOMYCIN HYDROCHLORIDE SCH MLS/HR: 1 INJECTION, POWDER, LYOPHILIZED, FOR SOLUTION INTRAVENOUS at 17:42

## 2020-11-19 RX ADMIN — ALPRAZOLAM SCH MG: 0.5 TABLET ORAL at 22:23

## 2020-11-19 RX ADMIN — OXYCODONE HYDROCHLORIDE PRN MG: 5 TABLET ORAL at 06:14

## 2020-11-20 LAB
%HYPO/RBC NFR BLD AUTO: (no result) %
ABSOLUTE LYMPHOCYTES# (MANUAL): 0 10^3/UL (ref 0.5–4.7)
ABSOLUTE MONOCYTES # (MANUAL): 0 10^3/UL (ref 0.1–1.4)
ADD MANUAL DIFF: YES
ALBUMIN SERPL-MCNC: 1.9 G/DL (ref 3.5–5)
ALP SERPL-CCNC: 197 U/L (ref 38–126)
ANION GAP SERPL CALC-SCNC: 6 MMOL/L (ref 5–19)
ANISOCYTOSIS BLD QL SMEAR: (no result)
AST SERPL-CCNC: 33 U/L (ref 17–59)
BASOPHILS NFR BLD MANUAL: 0 % (ref 0–2)
BILIRUB DIRECT SERPL-MCNC: 0.5 MG/DL (ref 0–0.4)
BILIRUB SERPL-MCNC: 0.8 MG/DL (ref 0.2–1.3)
BUN SERPL-MCNC: 25 MG/DL (ref 7–20)
CALCIUM: 7.9 MG/DL (ref 8.4–10.2)
CHLORIDE SERPL-SCNC: 105 MMOL/L (ref 98–107)
CO2 SERPL-SCNC: 25 MMOL/L (ref 22–30)
EOSINOPHIL NFR BLD MANUAL: 1 % (ref 0–6)
ERYTHROCYTE [DISTWIDTH] IN BLOOD BY AUTOMATED COUNT: 17.1 % (ref 11.5–14)
GLUCOSE SERPL-MCNC: 94 MG/DL (ref 75–110)
HCT VFR BLD CALC: 24.7 % (ref 37.9–51)
MCH RBC QN AUTO: 27 PG (ref 27–33.4)
MCHC RBC AUTO-ENTMCNC: 32.1 G/DL (ref 32–36)
MCV RBC AUTO: 84 FL (ref 80–97)
MONOCYTES % (MANUAL): 0 % (ref 3–13)
NEUTS BAND NFR BLD MANUAL: 16 % (ref 3–5)
PHOSPHATE SERPL-MCNC: 3.9 MG/DL (ref 2.5–4.5)
PLATELET # BLD: 307 10^3/UL (ref 150–450)
PLATELET COMMENT: ADEQUATE
POTASSIUM SERPL-SCNC: 4 MMOL/L (ref 3.6–5)
PROT SERPL-MCNC: 4.5 G/DL (ref 6.3–8.2)
RBC # BLD AUTO: 2.93 10^6/UL (ref 4.35–5.55)
SEGMENTED NEUTROPHILS % (MAN): 83 % (ref 42–78)
TOTAL CELLS COUNTED BLD: 100
VANCOMYCIN,TROUGH: 16.7 UG/ML (ref 5–20)
WBC # BLD AUTO: 42.4 10^3/UL (ref 4–10.5)

## 2020-11-20 RX ADMIN — SUCRALFATE SCH GM: 1 TABLET ORAL at 21:04

## 2020-11-20 RX ADMIN — SUCRALFATE SCH GM: 1 TABLET ORAL at 10:00

## 2020-11-20 RX ADMIN — OXYCODONE HYDROCHLORIDE PRN MG: 5 TABLET ORAL at 19:15

## 2020-11-20 RX ADMIN — OXYCODONE HYDROCHLORIDE PRN MG: 5 TABLET ORAL at 09:56

## 2020-11-20 RX ADMIN — VANCOMYCIN HYDROCHLORIDE SCH MLS/HR: 1 INJECTION, POWDER, LYOPHILIZED, FOR SOLUTION INTRAVENOUS at 06:42

## 2020-11-20 RX ADMIN — Medication PRN MG: at 23:07

## 2020-11-20 RX ADMIN — BACLOFEN PRN MG: 10 TABLET ORAL at 23:03

## 2020-11-20 RX ADMIN — FAMOTIDINE SCH MG: 20 TABLET, FILM COATED ORAL at 14:31

## 2020-11-20 RX ADMIN — SODIUM CHLORIDE AND POTASSIUM CHLORIDE PRN MLS/HR: 9; 2.98 INJECTION, SOLUTION INTRAVENOUS at 09:55

## 2020-11-20 RX ADMIN — FLUOXETINE SCH MG: 20 CAPSULE ORAL at 09:57

## 2020-11-20 RX ADMIN — PROMETHAZINE HYDROCHLORIDE PRN MG: 25 INJECTION INTRAMUSCULAR; INTRAVENOUS at 09:55

## 2020-11-20 RX ADMIN — PROMETHAZINE HYDROCHLORIDE PRN MG: 25 INJECTION INTRAMUSCULAR; INTRAVENOUS at 18:49

## 2020-11-20 RX ADMIN — ALPRAZOLAM SCH MG: 0.5 TABLET ORAL at 21:04

## 2020-11-20 RX ADMIN — VANCOMYCIN HYDROCHLORIDE SCH MLS/HR: 1 INJECTION, POWDER, LYOPHILIZED, FOR SOLUTION INTRAVENOUS at 17:54

## 2020-11-20 RX ADMIN — PROMETHAZINE HYDROCHLORIDE PRN MG: 25 INJECTION INTRAMUSCULAR; INTRAVENOUS at 03:33

## 2020-11-20 RX ADMIN — PANTOPRAZOLE SODIUM SCH MG: 40 TABLET, DELAYED RELEASE ORAL at 06:42

## 2020-11-20 RX ADMIN — OXYCODONE HYDROCHLORIDE PRN MG: 5 TABLET ORAL at 03:33

## 2020-11-20 RX ADMIN — ENOXAPARIN SODIUM SCH MG: 60 INJECTION SUBCUTANEOUS at 21:04

## 2020-11-20 RX ADMIN — SUCRALFATE SCH GM: 1 TABLET ORAL at 16:47

## 2020-11-20 RX ADMIN — PROMETHAZINE HYDROCHLORIDE PRN MG: 25 INJECTION INTRAMUSCULAR; INTRAVENOUS at 21:04

## 2020-11-20 RX ADMIN — ACETAMINOPHEN PRN MG: 325 TABLET ORAL at 23:07

## 2020-11-20 RX ADMIN — FAMOTIDINE SCH MG: 20 TABLET, FILM COATED ORAL at 21:04

## 2020-11-20 RX ADMIN — BACLOFEN PRN MG: 10 TABLET ORAL at 20:10

## 2020-11-20 RX ADMIN — CEFTRIAXONE SCH MLS/HR: 2 INJECTION, SOLUTION INTRAVENOUS at 09:54

## 2020-11-20 RX ADMIN — SUCRALFATE SCH GM: 1 TABLET ORAL at 07:49

## 2020-11-20 RX ADMIN — DOCUSATE SODIUM SCH: 100 CAPSULE, LIQUID FILLED ORAL at 09:55

## 2020-11-20 RX ADMIN — ALPRAZOLAM SCH MG: 0.5 TABLET ORAL at 09:56

## 2020-11-20 RX ADMIN — ENOXAPARIN SODIUM SCH MG: 60 INJECTION SUBCUTANEOUS at 09:55

## 2020-11-20 RX ADMIN — LUBIPROSTONE SCH: 24 CAPSULE, GELATIN COATED ORAL at 09:57

## 2020-11-20 NOTE — EKG REPORT
SEVERITY:- BORDERLINE ECG -

SINUS TACHYCARDIA

BORDERLINE T ABNORMALITIES, INFERIOR LEADS

BORDERLINE PROLONGED QT INTERVAL

:

Confirmed by: Sloane Mead MD 20-Nov-2020 13:27:37

## 2020-11-20 NOTE — RADIOLOGY REPORT (SQ)
EXAM DESCRIPTION:  CHEST SINGLE VIEW



IMAGES COMPLETED DATE/TIME:  11/20/2020 10:37 am



REASON FOR STUDY:  cough



COMPARISON:  11/15/2020



EXAM PARAMETERS:  NUMBER OF VIEWS: One view.

TECHNIQUE: Single frontal radiographic view of the chest acquired.

RADIATION DOSE: NA

LIMITATIONS: Low lung volumes.



FINDINGS:  LUNGS AND PLEURA: Minimal airspace disease in the right medial base.  Small right effusion
.  Possible small left effusion.  Findings are accentuated by low lung volumes.  No pneumothorax.

MEDIASTINUM AND HILAR STRUCTURES: No masses.  Contour normal.

HEART AND VASCULAR STRUCTURES: Heart size is stable.

BONES: No acute findings.

HARDWARE: [id number] is in place.

OTHER: No other significant finding.



IMPRESSION:  Right basilar airspace disease.  Small pleural effusions.  Findings are accentuated by l
ow lung volumes.



TECHNICAL DOCUMENTATION:  JOB ID:  [id number]

 2011 [employer]- All Rights Reserved



Reading location - IP/workstation name: [email or web address]

## 2020-11-21 LAB
ABSOLUTE LYMPHOCYTES# (MANUAL): 1.2 10^3/UL (ref 0.5–4.7)
ABSOLUTE MONOCYTES # (MANUAL): 0 10^3/UL (ref 0.1–1.4)
ADD MANUAL DIFF: YES
ANISOCYTOSIS BLD QL SMEAR: (no result)
BASOPHILS NFR BLD MANUAL: 0 % (ref 0–2)
EOSINOPHIL NFR BLD MANUAL: 0 % (ref 0–6)
ERYTHROCYTE [DISTWIDTH] IN BLOOD BY AUTOMATED COUNT: 17 % (ref 11.5–14)
HCT VFR BLD CALC: 22.5 % (ref 37.9–51)
HGB BLD-MCNC: 7.2 G/DL (ref 13.5–17)
HGB BLD-MCNC: 7.9 G/DL (ref 13.5–17)
MCH RBC QN AUTO: 27 PG (ref 27–33.4)
MCHC RBC AUTO-ENTMCNC: 32.1 G/DL (ref 32–36)
MCV RBC AUTO: 84 FL (ref 80–97)
MONOCYTES % (MANUAL): 0 % (ref 3–13)
NEUTS BAND NFR BLD MANUAL: 5 % (ref 3–5)
PLATELET # BLD: 204 10^3/UL (ref 150–450)
PLATELET CLUMP BLD QL SMEAR: PRESENT
PLATELET COMMENT: ADEQUATE
RBC # BLD AUTO: 2.68 10^6/UL (ref 4.35–5.55)
SEGMENTED NEUTROPHILS % (MAN): 91 % (ref 42–78)
TOTAL CELLS COUNTED BLD: 100
VARIANT LYMPHS NFR BLD MANUAL: 4 % (ref 13–45)
WBC # BLD AUTO: 31.2 10^3/UL (ref 4–10.5)

## 2020-11-21 RX ADMIN — SUCRALFATE SCH GM: 1 TABLET ORAL at 10:06

## 2020-11-21 RX ADMIN — GUAIFENESIN AND DEXTROMETHORPHAN PRN ML: 100; 10 SYRUP ORAL at 13:49

## 2020-11-21 RX ADMIN — OXYCODONE HYDROCHLORIDE PRN MG: 5 TABLET ORAL at 22:55

## 2020-11-21 RX ADMIN — PANTOPRAZOLE SODIUM SCH: 40 TABLET, DELAYED RELEASE ORAL at 06:00

## 2020-11-21 RX ADMIN — BACLOFEN PRN MG: 10 TABLET ORAL at 22:57

## 2020-11-21 RX ADMIN — ALPRAZOLAM SCH MG: 0.5 TABLET ORAL at 10:06

## 2020-11-21 RX ADMIN — OXYCODONE HYDROCHLORIDE PRN MG: 5 TABLET ORAL at 17:11

## 2020-11-21 RX ADMIN — SODIUM CHLORIDE AND POTASSIUM CHLORIDE PRN MLS/HR: 9; 2.98 INJECTION, SOLUTION INTRAVENOUS at 06:00

## 2020-11-21 RX ADMIN — ENOXAPARIN SODIUM SCH MG: 60 INJECTION SUBCUTANEOUS at 10:07

## 2020-11-21 RX ADMIN — ENOXAPARIN SODIUM SCH MG: 80 INJECTION SUBCUTANEOUS at 22:54

## 2020-11-21 RX ADMIN — FAMOTIDINE SCH MG: 20 TABLET, FILM COATED ORAL at 22:55

## 2020-11-21 RX ADMIN — DOCUSATE SODIUM SCH MG: 100 CAPSULE, LIQUID FILLED ORAL at 10:06

## 2020-11-21 RX ADMIN — OXYCODONE HYDROCHLORIDE PRN MG: 5 TABLET ORAL at 00:45

## 2020-11-21 RX ADMIN — GUAIFENESIN AND DEXTROMETHORPHAN PRN ML: 100; 10 SYRUP ORAL at 22:57

## 2020-11-21 RX ADMIN — VANCOMYCIN HYDROCHLORIDE SCH MLS/HR: 1 INJECTION, POWDER, LYOPHILIZED, FOR SOLUTION INTRAVENOUS at 06:00

## 2020-11-21 RX ADMIN — OXYCODONE HYDROCHLORIDE PRN MG: 5 TABLET ORAL at 08:12

## 2020-11-21 RX ADMIN — CEFTRIAXONE SCH MLS/HR: 2 INJECTION, SOLUTION INTRAVENOUS at 10:07

## 2020-11-21 RX ADMIN — VANCOMYCIN HYDROCHLORIDE SCH MLS/HR: 1 INJECTION, POWDER, LYOPHILIZED, FOR SOLUTION INTRAVENOUS at 17:12

## 2020-11-21 RX ADMIN — SUCRALFATE SCH GM: 1 TABLET ORAL at 08:12

## 2020-11-21 RX ADMIN — BACLOFEN PRN MG: 10 TABLET ORAL at 13:48

## 2020-11-21 RX ADMIN — SUCRALFATE SCH GM: 1 TABLET ORAL at 22:55

## 2020-11-21 RX ADMIN — PROMETHAZINE HYDROCHLORIDE PRN MG: 25 INJECTION INTRAMUSCULAR; INTRAVENOUS at 08:11

## 2020-11-21 RX ADMIN — FLUOXETINE SCH MG: 20 CAPSULE ORAL at 10:06

## 2020-11-21 RX ADMIN — PROMETHAZINE HYDROCHLORIDE PRN MG: 25 INJECTION INTRAMUSCULAR; INTRAVENOUS at 22:57

## 2020-11-21 RX ADMIN — FAMOTIDINE SCH MG: 20 TABLET, FILM COATED ORAL at 10:06

## 2020-11-21 RX ADMIN — LUBIPROSTONE SCH MCG: 24 CAPSULE, GELATIN COATED ORAL at 10:07

## 2020-11-21 RX ADMIN — PROMETHAZINE HYDROCHLORIDE PRN MG: 25 INJECTION INTRAMUSCULAR; INTRAVENOUS at 13:49

## 2020-11-21 RX ADMIN — ALPRAZOLAM SCH MG: 0.5 TABLET ORAL at 22:55

## 2020-11-21 RX ADMIN — DOCUSATE SODIUM SCH MG: 100 CAPSULE, LIQUID FILLED ORAL at 17:11

## 2020-11-21 RX ADMIN — SUCRALFATE SCH GM: 1 TABLET ORAL at 17:11

## 2020-11-22 LAB
ALBUMIN SERPL-MCNC: 1.8 G/DL (ref 3.5–5)
ALP SERPL-CCNC: 131 U/L (ref 38–126)
ANION GAP SERPL CALC-SCNC: 6 MMOL/L (ref 5–19)
AST SERPL-CCNC: 29 U/L (ref 17–59)
BILIRUB DIRECT SERPL-MCNC: 0.5 MG/DL (ref 0–0.4)
BILIRUB SERPL-MCNC: 0.8 MG/DL (ref 0.2–1.3)
BUN SERPL-MCNC: 21 MG/DL (ref 7–20)
CALCIUM: 7.2 MG/DL (ref 8.4–10.2)
CHLORIDE SERPL-SCNC: 111 MMOL/L (ref 98–107)
CO2 SERPL-SCNC: 22 MMOL/L (ref 22–30)
ERYTHROCYTE [DISTWIDTH] IN BLOOD BY AUTOMATED COUNT: 16.6 % (ref 11.5–14)
ERYTHROCYTE [DISTWIDTH] IN BLOOD BY AUTOMATED COUNT: 17.4 % (ref 11.5–14)
GLUCOSE SERPL-MCNC: 106 MG/DL (ref 75–110)
HCT VFR BLD CALC: 20.8 % (ref 37.9–51)
HCT VFR BLD CALC: 24.5 % (ref 37.9–51)
HGB BLD-MCNC: 8.1 G/DL (ref 13.5–17)
MCH RBC QN AUTO: 27.6 PG (ref 27–33.4)
MCH RBC QN AUTO: 27.9 PG (ref 27–33.4)
MCHC RBC AUTO-ENTMCNC: 32.8 G/DL (ref 32–36)
MCHC RBC AUTO-ENTMCNC: 33.2 G/DL (ref 32–36)
MCV RBC AUTO: 84 FL (ref 80–97)
MCV RBC AUTO: 84 FL (ref 80–97)
PLATELET # BLD: 106 10^3/UL (ref 150–450)
PLATELET # BLD: 123 10^3/UL (ref 150–450)
POTASSIUM SERPL-SCNC: 3.5 MMOL/L (ref 3.6–5)
PROT SERPL-MCNC: 4.5 G/DL (ref 6.3–8.2)
RBC # BLD AUTO: 2.47 10^6/UL (ref 4.35–5.55)
RBC # BLD AUTO: 2.91 10^6/UL (ref 4.35–5.55)
WBC # BLD AUTO: 20.3 10^3/UL (ref 4–10.5)
WBC # BLD AUTO: 22.8 10^3/UL (ref 4–10.5)

## 2020-11-22 PROCEDURE — 3E04305 INTRODUCTION OF OTHER ANTINEOPLASTIC INTO CENTRAL VEIN, PERCUTANEOUS APPROACH: ICD-10-PCS | Performed by: INTERNAL MEDICINE

## 2020-11-22 PROCEDURE — 3E0336Z INTRODUCTION OF NUTRITIONAL SUBSTANCE INTO PERIPHERAL VEIN, PERCUTANEOUS APPROACH: ICD-10-PCS | Performed by: INTERNAL MEDICINE

## 2020-11-22 PROCEDURE — 30233N1 TRANSFUSION OF NONAUTOLOGOUS RED BLOOD CELLS INTO PERIPHERAL VEIN, PERCUTANEOUS APPROACH: ICD-10-PCS | Performed by: STUDENT IN AN ORGANIZED HEALTH CARE EDUCATION/TRAINING PROGRAM

## 2020-11-22 RX ADMIN — PROMETHAZINE HYDROCHLORIDE PRN MG: 25 INJECTION INTRAMUSCULAR; INTRAVENOUS at 09:54

## 2020-11-22 RX ADMIN — SODIUM CHLORIDE AND POTASSIUM CHLORIDE PRN MLS/HR: 9; 2.98 INJECTION, SOLUTION INTRAVENOUS at 23:10

## 2020-11-22 RX ADMIN — Medication PRN MG: at 20:50

## 2020-11-22 RX ADMIN — CEFTRIAXONE SCH MLS/HR: 2 INJECTION, SOLUTION INTRAVENOUS at 09:56

## 2020-11-22 RX ADMIN — SUCRALFATE SCH: 1 TABLET ORAL at 18:47

## 2020-11-22 RX ADMIN — OXYCODONE HYDROCHLORIDE PRN MG: 5 TABLET ORAL at 20:51

## 2020-11-22 RX ADMIN — ALPRAZOLAM PRN MG: 0.5 TABLET ORAL at 21:31

## 2020-11-22 RX ADMIN — BACLOFEN PRN MG: 10 TABLET ORAL at 20:50

## 2020-11-22 RX ADMIN — FAMOTIDINE SCH MG: 20 TABLET, FILM COATED ORAL at 09:54

## 2020-11-22 RX ADMIN — BACLOFEN PRN MG: 10 TABLET ORAL at 09:54

## 2020-11-22 RX ADMIN — VANCOMYCIN HYDROCHLORIDE SCH MLS/HR: 1 INJECTION, POWDER, LYOPHILIZED, FOR SOLUTION INTRAVENOUS at 18:48

## 2020-11-22 RX ADMIN — DOCUSATE SODIUM SCH MG: 100 CAPSULE, LIQUID FILLED ORAL at 09:54

## 2020-11-22 RX ADMIN — SUCRALFATE SCH: 1 TABLET ORAL at 13:02

## 2020-11-22 RX ADMIN — POTASSIUM CHLORIDE SCH MLS/HR: 29.8 INJECTION, SOLUTION INTRAVENOUS at 23:10

## 2020-11-22 RX ADMIN — LUBIPROSTONE SCH MCG: 24 CAPSULE, GELATIN COATED ORAL at 09:55

## 2020-11-22 RX ADMIN — PROMETHAZINE HYDROCHLORIDE PRN MG: 25 INJECTION INTRAMUSCULAR; INTRAVENOUS at 20:49

## 2020-11-22 RX ADMIN — SUCRALFATE SCH: 1 TABLET ORAL at 09:56

## 2020-11-22 RX ADMIN — ENOXAPARIN SODIUM SCH MG: 80 INJECTION SUBCUTANEOUS at 09:55

## 2020-11-22 RX ADMIN — ENOXAPARIN SODIUM SCH MG: 80 INJECTION SUBCUTANEOUS at 21:31

## 2020-11-22 RX ADMIN — OXYCODONE HYDROCHLORIDE PRN MG: 5 TABLET ORAL at 09:55

## 2020-11-22 RX ADMIN — ALPRAZOLAM SCH MG: 0.5 TABLET ORAL at 09:54

## 2020-11-22 RX ADMIN — GUAIFENESIN AND DEXTROMETHORPHAN PRN ML: 100; 10 SYRUP ORAL at 09:55

## 2020-11-22 RX ADMIN — SUCRALFATE SCH GM: 1 TABLET ORAL at 21:31

## 2020-11-22 RX ADMIN — PANTOPRAZOLE SODIUM SCH MG: 40 TABLET, DELAYED RELEASE ORAL at 06:34

## 2020-11-22 RX ADMIN — ACETAMINOPHEN PRN MG: 325 TABLET ORAL at 23:06

## 2020-11-22 RX ADMIN — VANCOMYCIN HYDROCHLORIDE SCH MLS/HR: 1 INJECTION, POWDER, LYOPHILIZED, FOR SOLUTION INTRAVENOUS at 06:34

## 2020-11-22 RX ADMIN — FAMOTIDINE SCH MG: 20 TABLET, FILM COATED ORAL at 21:30

## 2020-11-22 RX ADMIN — FLUOXETINE SCH MG: 20 CAPSULE ORAL at 09:54

## 2020-11-22 RX ADMIN — DOCUSATE SODIUM SCH MG: 100 CAPSULE, LIQUID FILLED ORAL at 18:48

## 2020-11-22 NOTE — EKG REPORT
SEVERITY:- BORDERLINE ECG -

SINUS RHYTHM

BORDERLINE T ABNORMALITIES, DIFFUSE LEADS

:

Confirmed by: Sloane Mead MD 22-Nov-2020 18:57:16

## 2020-11-23 LAB
ABSOLUTE LYMPHOCYTES# (MANUAL): 0.2 10^3/UL (ref 0.5–4.7)
ABSOLUTE MONOCYTES # (MANUAL): 0.2 10^3/UL (ref 0.1–1.4)
ADD MANUAL DIFF: YES
ALBUMIN SERPL-MCNC: 1.8 G/DL (ref 3.5–5)
ALP SERPL-CCNC: 122 U/L (ref 38–126)
ANION GAP SERPL CALC-SCNC: 6 MMOL/L (ref 5–19)
ANISOCYTOSIS BLD QL SMEAR: (no result)
AST SERPL-CCNC: 25 U/L (ref 17–59)
BASOPHILS NFR BLD MANUAL: 0 % (ref 0–2)
BILIRUB DIRECT SERPL-MCNC: 0.6 MG/DL (ref 0–0.4)
BILIRUB SERPL-MCNC: 1 MG/DL (ref 0.2–1.3)
BUN SERPL-MCNC: 21 MG/DL (ref 7–20)
CALCIUM: 6.8 MG/DL (ref 8.4–10.2)
CHLORIDE SERPL-SCNC: 113 MMOL/L (ref 98–107)
CO2 SERPL-SCNC: 20 MMOL/L (ref 22–30)
EOSINOPHIL NFR BLD MANUAL: 1 % (ref 0–6)
ERYTHROCYTE [DISTWIDTH] IN BLOOD BY AUTOMATED COUNT: 16.9 % (ref 11.5–14)
GLUCOSE SERPL-MCNC: 102 MG/DL (ref 75–110)
HCT VFR BLD CALC: 25.2 % (ref 37.9–51)
HGB BLD-MCNC: 6.8 G/DL (ref 13.5–17)
HGB BLD-MCNC: 8.3 G/DL (ref 13.5–17)
MCH RBC QN AUTO: 28.2 PG (ref 27–33.4)
MCHC RBC AUTO-ENTMCNC: 33.1 G/DL (ref 32–36)
MCV RBC AUTO: 85 FL (ref 80–97)
MONOCYTES % (MANUAL): 1 % (ref 3–13)
NEUTS HYPERSEG BLD QL SMEAR: PRESENT
PLATELET # BLD: 56 10^3/UL (ref 150–450)
PLATELET COMMENT: (no result)
POTASSIUM SERPL-SCNC: 3.9 MMOL/L (ref 3.6–5)
PROT SERPL-MCNC: 4.4 G/DL (ref 6.3–8.2)
RBC # BLD AUTO: 2.96 10^6/UL (ref 4.35–5.55)
ROULEAUX BLD QL SMEAR: SLIGHT
SEGMENTED NEUTROPHILS % (MAN): 97 % (ref 42–78)
TOTAL CELLS COUNTED BLD: 100
VARIANT LYMPHS NFR BLD MANUAL: 1 % (ref 13–45)
WBC # BLD AUTO: 16.7 10^3/UL (ref 4–10.5)

## 2020-11-23 RX ADMIN — FAMOTIDINE SCH MG: 20 TABLET, FILM COATED ORAL at 21:14

## 2020-11-23 RX ADMIN — OXYCODONE HYDROCHLORIDE PRN MG: 5 TABLET ORAL at 19:02

## 2020-11-23 RX ADMIN — FAMOTIDINE SCH MG: 20 TABLET, FILM COATED ORAL at 09:13

## 2020-11-23 RX ADMIN — ENOXAPARIN SODIUM SCH: 80 INJECTION SUBCUTANEOUS at 09:38

## 2020-11-23 RX ADMIN — MEROPENEM SCH MLS/HR: 1 INJECTION INTRAVENOUS at 14:48

## 2020-11-23 RX ADMIN — VANCOMYCIN HYDROCHLORIDE SCH MLS/HR: 1 INJECTION, POWDER, LYOPHILIZED, FOR SOLUTION INTRAVENOUS at 18:49

## 2020-11-23 RX ADMIN — ENOXAPARIN SODIUM SCH: 80 INJECTION SUBCUTANEOUS at 21:14

## 2020-11-23 RX ADMIN — DEXAMETHASONE SODIUM PHOSPHATE PRN MG: 10 INJECTION INTRAMUSCULAR; INTRAVENOUS at 08:39

## 2020-11-23 RX ADMIN — SODIUM CHLORIDE AND POTASSIUM CHLORIDE PRN MLS/HR: 9; 2.98 INJECTION, SOLUTION INTRAVENOUS at 14:49

## 2020-11-23 RX ADMIN — PROMETHAZINE HYDROCHLORIDE PRN MG: 25 INJECTION INTRAMUSCULAR; INTRAVENOUS at 11:50

## 2020-11-23 RX ADMIN — SUCRALFATE SCH GM: 1 TABLET ORAL at 21:14

## 2020-11-23 RX ADMIN — MEROPENEM SCH MLS/HR: 1 INJECTION INTRAVENOUS at 06:09

## 2020-11-23 RX ADMIN — DEXAMETHASONE SODIUM PHOSPHATE PRN MG: 10 INJECTION INTRAMUSCULAR; INTRAVENOUS at 17:29

## 2020-11-23 RX ADMIN — LUBIPROSTONE SCH MCG: 24 CAPSULE, GELATIN COATED ORAL at 09:13

## 2020-11-23 RX ADMIN — SUCRALFATE SCH: 1 TABLET ORAL at 12:56

## 2020-11-23 RX ADMIN — PROMETHAZINE HYDROCHLORIDE PRN MG: 25 INJECTION INTRAMUSCULAR; INTRAVENOUS at 20:39

## 2020-11-23 RX ADMIN — DOCUSATE SODIUM SCH MG: 100 CAPSULE, LIQUID FILLED ORAL at 17:29

## 2020-11-23 RX ADMIN — VANCOMYCIN HYDROCHLORIDE SCH MLS/HR: 1 INJECTION, POWDER, LYOPHILIZED, FOR SOLUTION INTRAVENOUS at 05:46

## 2020-11-23 RX ADMIN — SUCRALFATE SCH GM: 1 TABLET ORAL at 17:29

## 2020-11-23 RX ADMIN — DOCUSATE SODIUM SCH MG: 100 CAPSULE, LIQUID FILLED ORAL at 09:13

## 2020-11-23 RX ADMIN — POTASSIUM CHLORIDE SCH MLS/HR: 29.8 INJECTION, SOLUTION INTRAVENOUS at 01:10

## 2020-11-23 RX ADMIN — SUCRALFATE SCH GM: 1 TABLET ORAL at 09:12

## 2020-11-23 RX ADMIN — PANTOPRAZOLE SODIUM SCH: 40 TABLET, DELAYED RELEASE ORAL at 05:28

## 2020-11-23 RX ADMIN — PROMETHAZINE HYDROCHLORIDE PRN MG: 25 INJECTION INTRAMUSCULAR; INTRAVENOUS at 05:45

## 2020-11-23 RX ADMIN — FLUOXETINE SCH MG: 20 CAPSULE ORAL at 09:12

## 2020-11-23 RX ADMIN — MEROPENEM SCH MLS/HR: 1 INJECTION INTRAVENOUS at 21:13

## 2020-11-24 LAB
ANION GAP SERPL CALC-SCNC: 5 MMOL/L (ref 5–19)
BUN SERPL-MCNC: 23 MG/DL (ref 7–20)
CALCIUM: 7.1 MG/DL (ref 8.4–10.2)
CHLORIDE SERPL-SCNC: 115 MMOL/L (ref 98–107)
CO2 SERPL-SCNC: 22 MMOL/L (ref 22–30)
ERYTHROCYTE [DISTWIDTH] IN BLOOD BY AUTOMATED COUNT: 16.8 % (ref 11.5–14)
GLUCOSE SERPL-MCNC: 125 MG/DL (ref 75–110)
HCT VFR BLD CALC: 21 % (ref 37.9–51)
HGB BLD-MCNC: 6.8 G/DL (ref 13.5–17)
MCH RBC QN AUTO: 28 PG (ref 27–33.4)
MCHC RBC AUTO-ENTMCNC: 32.6 G/DL (ref 32–36)
MCV RBC AUTO: 86 FL (ref 80–97)
PLATELET # BLD: 31 10^3/UL (ref 150–450)
POTASSIUM SERPL-SCNC: 3.5 MMOL/L (ref 3.6–5)
RBC # BLD AUTO: 2.44 10^6/UL (ref 4.35–5.55)
WBC # BLD AUTO: 11.1 10^3/UL (ref 4–10.5)

## 2020-11-24 RX ADMIN — OXYCODONE HYDROCHLORIDE PRN MG: 5 TABLET ORAL at 02:49

## 2020-11-24 RX ADMIN — ENOXAPARIN SODIUM SCH: 80 INJECTION SUBCUTANEOUS at 09:19

## 2020-11-24 RX ADMIN — Medication PRN MG: at 02:49

## 2020-11-24 RX ADMIN — OXYCODONE HYDROCHLORIDE PRN MG: 5 TABLET ORAL at 22:24

## 2020-11-24 RX ADMIN — DEXAMETHASONE SODIUM PHOSPHATE PRN MG: 10 INJECTION INTRAMUSCULAR; INTRAVENOUS at 02:48

## 2020-11-24 RX ADMIN — FLUOXETINE SCH MG: 20 CAPSULE ORAL at 09:25

## 2020-11-24 RX ADMIN — SUCRALFATE SCH GM: 1 TABLET ORAL at 09:25

## 2020-11-24 RX ADMIN — DEXAMETHASONE SODIUM PHOSPHATE PRN MG: 10 INJECTION INTRAMUSCULAR; INTRAVENOUS at 16:58

## 2020-11-24 RX ADMIN — PROMETHAZINE HYDROCHLORIDE PRN MG: 25 INJECTION INTRAMUSCULAR; INTRAVENOUS at 22:36

## 2020-11-24 RX ADMIN — VANCOMYCIN HYDROCHLORIDE SCH MLS/HR: 1 INJECTION, POWDER, LYOPHILIZED, FOR SOLUTION INTRAVENOUS at 19:08

## 2020-11-24 RX ADMIN — FAMOTIDINE SCH MG: 20 TABLET, FILM COATED ORAL at 09:25

## 2020-11-24 RX ADMIN — DOCUSATE SODIUM SCH MG: 100 CAPSULE, LIQUID FILLED ORAL at 09:25

## 2020-11-24 RX ADMIN — PROMETHAZINE HYDROCHLORIDE PRN MG: 25 INJECTION INTRAMUSCULAR; INTRAVENOUS at 09:24

## 2020-11-24 RX ADMIN — PANTOPRAZOLE SODIUM SCH: 40 TABLET, DELAYED RELEASE ORAL at 06:24

## 2020-11-24 RX ADMIN — OXYCODONE HYDROCHLORIDE PRN MG: 5 TABLET ORAL at 09:24

## 2020-11-24 RX ADMIN — DOCUSATE SODIUM SCH: 100 CAPSULE, LIQUID FILLED ORAL at 18:43

## 2020-11-24 RX ADMIN — FAMOTIDINE SCH MG: 20 TABLET, FILM COATED ORAL at 22:25

## 2020-11-24 RX ADMIN — VANCOMYCIN HYDROCHLORIDE SCH MLS/HR: 1 INJECTION, POWDER, LYOPHILIZED, FOR SOLUTION INTRAVENOUS at 05:17

## 2020-11-24 RX ADMIN — SUCRALFATE SCH: 1 TABLET ORAL at 12:39

## 2020-11-24 RX ADMIN — MEROPENEM SCH MLS/HR: 1 INJECTION INTRAVENOUS at 05:17

## 2020-11-24 RX ADMIN — BACLOFEN PRN MG: 10 TABLET ORAL at 22:23

## 2020-11-24 RX ADMIN — SUCRALFATE SCH: 1 TABLET ORAL at 17:03

## 2020-11-24 RX ADMIN — LUBIPROSTONE SCH MCG: 24 CAPSULE, GELATIN COATED ORAL at 09:25

## 2020-11-24 RX ADMIN — ALPRAZOLAM PRN MG: 0.5 TABLET ORAL at 09:29

## 2020-11-24 RX ADMIN — MEROPENEM SCH MLS/HR: 1 INJECTION INTRAVENOUS at 16:17

## 2020-11-24 RX ADMIN — Medication PRN MG: at 22:23

## 2020-11-24 RX ADMIN — SUCRALFATE SCH GM: 1 TABLET ORAL at 22:23

## 2020-11-24 RX ADMIN — MEROPENEM SCH MLS/HR: 1 INJECTION INTRAVENOUS at 22:26

## 2020-11-25 LAB
ABSOLUTE LYMPHOCYTES# (MANUAL): 0.6 10^3/UL (ref 0.5–4.7)
ABSOLUTE MONOCYTES # (MANUAL): 0.1 10^3/UL (ref 0.1–1.4)
ADD MANUAL DIFF: YES
ANION GAP SERPL CALC-SCNC: 6 MMOL/L (ref 5–19)
ANISOCYTOSIS BLD QL SMEAR: SLIGHT
APPEARANCE UR: (no result)
APTT PPP: (no result) S
BASOPHILS NFR BLD MANUAL: 0 % (ref 0–2)
BILIRUB UR QL STRIP: NEGATIVE
BUN SERPL-MCNC: 25 MG/DL (ref 7–20)
CALCIUM: 7.2 MG/DL (ref 8.4–10.2)
CHLORIDE SERPL-SCNC: 116 MMOL/L (ref 98–107)
CO2 SERPL-SCNC: 21 MMOL/L (ref 22–30)
DACRYOCYTES BLD QL SMEAR: SLIGHT
EOSINOPHIL NFR BLD MANUAL: 9 % (ref 0–6)
ERYTHROCYTE [DISTWIDTH] IN BLOOD BY AUTOMATED COUNT: 16.7 % (ref 11.5–14)
GLUCOSE SERPL-MCNC: 103 MG/DL (ref 75–110)
GLUCOSE UR STRIP-MCNC: NEGATIVE MG/DL
HCT VFR BLD CALC: 27 % (ref 37.9–51)
HGB BLD-MCNC: 9.2 G/DL (ref 13.5–17)
KETONES UR STRIP-MCNC: NEGATIVE MG/DL
MCH RBC QN AUTO: 29.4 PG (ref 27–33.4)
MCHC RBC AUTO-ENTMCNC: 33.9 G/DL (ref 32–36)
MCV RBC AUTO: 87 FL (ref 80–97)
MONOCYTES % (MANUAL): 1 % (ref 3–13)
NEUTS BAND NFR BLD MANUAL: 1 % (ref 3–5)
PH UR STRIP: 6 [PH] (ref 5–9)
PLATELET # BLD: 31 10^3/UL (ref 150–450)
PLATELET COMMENT: (no result)
POIKILOCYTOSIS BLD QL SMEAR: SLIGHT
POLYCHROMASIA BLD QL SMEAR: SLIGHT
POTASSIUM SERPL-SCNC: 3.9 MMOL/L (ref 3.6–5)
PROT UR STRIP-MCNC: 100 MG/DL
RBC # BLD AUTO: 3.12 10^6/UL (ref 4.35–5.55)
SEGMENTED NEUTROPHILS % (MAN): 81 % (ref 42–78)
SP GR UR STRIP: 1.02
TOTAL CELLS COUNTED BLD: 100
TOXIC GRANULES BLD QL SMEAR: SLIGHT
UROBILINOGEN UR-MCNC: NEGATIVE MG/DL (ref ?–2)
VANCOMYCIN,TROUGH: 18.8 UG/ML (ref 5–20)
VARIANT LYMPHS NFR BLD MANUAL: 7 % (ref 13–45)
WBC # BLD AUTO: 7.3 10^3/UL (ref 4–10.5)

## 2020-11-25 RX ADMIN — SUCRALFATE SCH: 1 TABLET ORAL at 15:53

## 2020-11-25 RX ADMIN — FLUOXETINE SCH MG: 20 CAPSULE ORAL at 10:17

## 2020-11-25 RX ADMIN — DOCUSATE SODIUM SCH: 100 CAPSULE, LIQUID FILLED ORAL at 17:20

## 2020-11-25 RX ADMIN — SUCRALFATE SCH: 1 TABLET ORAL at 08:17

## 2020-11-25 RX ADMIN — DEXAMETHASONE SODIUM PHOSPHATE PRN MG: 10 INJECTION INTRAMUSCULAR; INTRAVENOUS at 08:46

## 2020-11-25 RX ADMIN — SUCRALFATE SCH GM: 1 TABLET ORAL at 22:01

## 2020-11-25 RX ADMIN — PROMETHAZINE HYDROCHLORIDE PRN MG: 25 INJECTION INTRAMUSCULAR; INTRAVENOUS at 19:54

## 2020-11-25 RX ADMIN — DOCUSATE SODIUM SCH MG: 100 CAPSULE, LIQUID FILLED ORAL at 10:17

## 2020-11-25 RX ADMIN — PANTOPRAZOLE SODIUM SCH: 40 TABLET, DELAYED RELEASE ORAL at 08:17

## 2020-11-25 RX ADMIN — SODIUM CHLORIDE AND POTASSIUM CHLORIDE PRN MLS/HR: 9; 2.98 INJECTION, SOLUTION INTRAVENOUS at 05:43

## 2020-11-25 RX ADMIN — LUBIPROSTONE SCH MCG: 24 CAPSULE, GELATIN COATED ORAL at 10:17

## 2020-11-25 RX ADMIN — DEXAMETHASONE SODIUM PHOSPHATE PRN MG: 10 INJECTION INTRAMUSCULAR; INTRAVENOUS at 01:03

## 2020-11-25 RX ADMIN — VANCOMYCIN HYDROCHLORIDE SCH MLS/HR: 1 INJECTION, POWDER, LYOPHILIZED, FOR SOLUTION INTRAVENOUS at 05:43

## 2020-11-25 RX ADMIN — FAMOTIDINE SCH MG: 20 TABLET, FILM COATED ORAL at 22:02

## 2020-11-25 RX ADMIN — SUCRALFATE SCH: 1 TABLET ORAL at 10:57

## 2020-11-25 RX ADMIN — OXYCODONE HYDROCHLORIDE PRN MG: 5 TABLET ORAL at 22:02

## 2020-11-25 RX ADMIN — PROMETHAZINE HYDROCHLORIDE PRN MG: 25 INJECTION INTRAMUSCULAR; INTRAVENOUS at 07:35

## 2020-11-25 RX ADMIN — DEXAMETHASONE SODIUM PHOSPHATE PRN MG: 10 INJECTION INTRAMUSCULAR; INTRAVENOUS at 15:37

## 2020-11-25 RX ADMIN — FAMOTIDINE SCH MG: 20 TABLET, FILM COATED ORAL at 10:17

## 2020-11-25 RX ADMIN — OXYCODONE HYDROCHLORIDE PRN MG: 5 TABLET ORAL at 15:37

## 2020-11-25 RX ADMIN — PROMETHAZINE HYDROCHLORIDE PRN MG: 25 INJECTION INTRAMUSCULAR; INTRAVENOUS at 12:46

## 2020-11-25 RX ADMIN — MEROPENEM SCH MLS/HR: 1 INJECTION INTRAVENOUS at 05:42

## 2020-11-26 LAB
ANION GAP SERPL CALC-SCNC: 4 MMOL/L (ref 5–19)
ARTERIAL BLOOD FIO2: (no result)
ARTERIAL BLOOD H2CO3: 1.08 MMOL/L (ref 1.05–1.35)
ARTERIAL BLOOD HCO3: 19.5 MMOL/L (ref 20–24)
ARTERIAL BLOOD PCO2: 35.9 MMHG (ref 35–45)
ARTERIAL BLOOD PH: 7.35 (ref 7.35–7.45)
ARTERIAL BLOOD PO2: 86.7 MMHG (ref 80–100)
ARTERIAL BLOOD TOTAL CO2: 20.6 MMOL/L (ref 23–27)
BASE EXCESS BLDA CALC-SCNC: -5.4 MMOL/L
BUN SERPL-MCNC: 28 MG/DL (ref 7–20)
CALCIUM: 7.1 MG/DL (ref 8.4–10.2)
CHLORIDE SERPL-SCNC: 116 MMOL/L (ref 98–107)
CO2 SERPL-SCNC: 22 MMOL/L (ref 22–30)
ERYTHROCYTE [DISTWIDTH] IN BLOOD BY AUTOMATED COUNT: 17.1 % (ref 11.5–14)
GLUCOSE SERPL-MCNC: 106 MG/DL (ref 75–110)
HCT VFR BLD CALC: 25 % (ref 37.9–51)
HGB BLD-MCNC: 8.4 G/DL (ref 13.5–17)
MCH RBC QN AUTO: 29.3 PG (ref 27–33.4)
MCHC RBC AUTO-ENTMCNC: 33.6 G/DL (ref 32–36)
MCV RBC AUTO: 87 FL (ref 80–97)
PLATELET # BLD: 41 10^3/UL (ref 150–450)
POTASSIUM SERPL-SCNC: 3.9 MMOL/L (ref 3.6–5)
RBC # BLD AUTO: 2.86 10^6/UL (ref 4.35–5.55)
SAO2 % BLDA: 96.3 % (ref 94–98)
WBC # BLD AUTO: 3.3 10^3/UL (ref 4–10.5)

## 2020-11-26 RX ADMIN — LUBIPROSTONE SCH MCG: 24 CAPSULE, GELATIN COATED ORAL at 10:02

## 2020-11-26 RX ADMIN — SODIUM CHLORIDE AND POTASSIUM CHLORIDE PRN MLS/HR: 9; 2.98 INJECTION, SOLUTION INTRAVENOUS at 08:05

## 2020-11-26 RX ADMIN — Medication PRN MG: at 21:56

## 2020-11-26 RX ADMIN — FAMOTIDINE SCH MG: 20 TABLET, FILM COATED ORAL at 10:01

## 2020-11-26 RX ADMIN — PROMETHAZINE HYDROCHLORIDE PRN MG: 25 INJECTION INTRAMUSCULAR; INTRAVENOUS at 10:01

## 2020-11-26 RX ADMIN — DEXAMETHASONE SODIUM PHOSPHATE PRN MG: 10 INJECTION INTRAMUSCULAR; INTRAVENOUS at 01:03

## 2020-11-26 RX ADMIN — FLUOXETINE SCH MG: 20 CAPSULE ORAL at 10:01

## 2020-11-26 RX ADMIN — DOCUSATE SODIUM SCH: 100 CAPSULE, LIQUID FILLED ORAL at 11:44

## 2020-11-26 RX ADMIN — PROMETHAZINE HYDROCHLORIDE PRN MG: 25 INJECTION INTRAMUSCULAR; INTRAVENOUS at 03:44

## 2020-11-26 RX ADMIN — METOCLOPRAMIDE SCH MG: 5 INJECTION, SOLUTION INTRAMUSCULAR; INTRAVENOUS at 21:56

## 2020-11-26 RX ADMIN — SUCRALFATE SCH: 1 TABLET ORAL at 18:30

## 2020-11-26 RX ADMIN — SUCRALFATE SCH: 1 TABLET ORAL at 21:44

## 2020-11-26 RX ADMIN — OXYCODONE HYDROCHLORIDE PRN MG: 5 TABLET ORAL at 10:10

## 2020-11-26 RX ADMIN — FENTANYL TRANSDERMAL SCH EACH: 25 PATCH, EXTENDED RELEASE TRANSDERMAL at 16:53

## 2020-11-26 RX ADMIN — ALPRAZOLAM PRN MG: 0.5 TABLET ORAL at 12:33

## 2020-11-26 RX ADMIN — ENOXAPARIN SODIUM SCH: 80 INJECTION SUBCUTANEOUS at 21:44

## 2020-11-26 RX ADMIN — SUCRALFATE SCH GM: 1 TABLET ORAL at 08:05

## 2020-11-26 RX ADMIN — SUCRALFATE SCH GM: 1 TABLET ORAL at 12:33

## 2020-11-26 RX ADMIN — FAMOTIDINE SCH MG: 10 INJECTION INTRAVENOUS at 21:56

## 2020-11-26 RX ADMIN — METOCLOPRAMIDE SCH MG: 5 INJECTION, SOLUTION INTRAMUSCULAR; INTRAVENOUS at 16:52

## 2020-11-26 RX ADMIN — PANTOPRAZOLE SODIUM SCH MG: 40 TABLET, DELAYED RELEASE ORAL at 05:19

## 2020-11-26 RX ADMIN — DOCUSATE SODIUM SCH: 100 CAPSULE, LIQUID FILLED ORAL at 18:30

## 2020-11-26 RX ADMIN — ACETAMINOPHEN PRN MG: 325 TABLET ORAL at 21:57

## 2020-11-26 NOTE — RADIOLOGY REPORT (SQ)
EXAM DESCRIPTION:  CHEST 2 VIEWS



IMAGES COMPLETED DATE/TIME:  11/26/2020 3:08 pm



REASON FOR STUDY:  potential aspiration, low temp



COMPARISON:  Chest films 11/15/2020, 11/20/2020



EXAM PARAMETERS:  NUMBER OF VIEWS: two views

TECHNIQUE: Digital Frontal and Lateral radiographic views of the chest acquired.

RADIATION DOSE: NA

LIMITATIONS: none



FINDINGS:  LUNGS AND PLEURA: Small pleural effusions layer in the posterior costophrenic sulci, best 
shown on lateral view.

There is bibasilar consolidation atelectasis versus pneumonia right greater than left.

Lungs are otherwise well inflated and clear.

No pneumothorax.

MEDIASTINUM AND HILAR STRUCTURES: No masses or contour abnormalities.

HEART AND VASCULAR STRUCTURES: No cardiomegaly

BONES: No acute findings.

HARDWARE: Left-sided permanent central line tip superior vena cava

OTHER: No other significant finding.



IMPRESSION:  Small bilateral pleural effusions in the posterior costophrenic sulci

Bibasilar airspace disease atelectasis versus pneumonia right greater than left



TECHNICAL DOCUMENTATION:  JOB ID:  [id number]

 [email or web address]- All Rights Reserved



Reading location - IP/workstation name: [id number]

## 2020-11-27 LAB
ALBUMIN SERPL-MCNC: 1.7 G/DL (ref 3.5–5)
ALP SERPL-CCNC: 99 U/L (ref 38–126)
ANION GAP SERPL CALC-SCNC: 5 MMOL/L (ref 5–19)
AST SERPL-CCNC: 17 U/L (ref 17–59)
BILIRUB DIRECT SERPL-MCNC: 0.9 MG/DL (ref 0–0.4)
BILIRUB SERPL-MCNC: 1.4 MG/DL (ref 0.2–1.3)
BUN SERPL-MCNC: 39 MG/DL (ref 7–20)
CALCIUM: 7.3 MG/DL (ref 8.4–10.2)
CHLORIDE SERPL-SCNC: 116 MMOL/L (ref 98–107)
CO2 SERPL-SCNC: 20 MMOL/L (ref 22–30)
ERYTHROCYTE [DISTWIDTH] IN BLOOD BY AUTOMATED COUNT: 17.8 % (ref 11.5–14)
ERYTHROCYTE [DISTWIDTH] IN BLOOD BY AUTOMATED COUNT: 17.8 % (ref 11.5–14)
GLUCOSE SERPL-MCNC: 117 MG/DL (ref 75–110)
HCT VFR BLD CALC: 24.8 % (ref 37.9–51)
HCT VFR BLD CALC: 25.4 % (ref 37.9–51)
HGB BLD-MCNC: 8.1 G/DL (ref 13.5–17)
HGB BLD-MCNC: 8.2 G/DL (ref 13.5–17)
INR PPP: 7.79
MCH RBC QN AUTO: 28.2 PG (ref 27–33.4)
MCH RBC QN AUTO: 28.3 PG (ref 27–33.4)
MCHC RBC AUTO-ENTMCNC: 32.3 G/DL (ref 32–36)
MCHC RBC AUTO-ENTMCNC: 32.5 G/DL (ref 32–36)
MCV RBC AUTO: 87 FL (ref 80–97)
MCV RBC AUTO: 88 FL (ref 80–97)
PHOSPHATE SERPL-MCNC: 2.6 MG/DL (ref 2.5–4.5)
PLATELET # BLD: 77 10^3/UL (ref 150–450)
PLATELET # BLD: 83 10^3/UL (ref 150–450)
POTASSIUM SERPL-SCNC: 3.9 MMOL/L (ref 3.6–5)
POTASSIUM SERPL-SCNC: 3.9 MMOL/L (ref 3.6–5)
PROT SERPL-MCNC: 3.8 G/DL (ref 6.3–8.2)
PROTHROMBIN TIME: 64.2 SEC (ref 11.4–15.4)
RBC # BLD AUTO: 2.85 10^6/UL (ref 4.35–5.55)
RBC # BLD AUTO: 2.9 10^6/UL (ref 4.35–5.55)
TRIGL SERPL-MCNC: 114 MG/DL (ref ?–150)
WBC # BLD AUTO: 1.1 10^3/UL (ref 4–10.5)

## 2020-11-27 RX ADMIN — Medication PRN MG: at 21:26

## 2020-11-27 RX ADMIN — FLUOXETINE SCH: 20 CAPSULE ORAL at 12:46

## 2020-11-27 RX ADMIN — SUCRALFATE SCH: 1 TABLET ORAL at 15:53

## 2020-11-27 RX ADMIN — INSULIN HUMAN SCH: 100 INJECTION, SOLUTION PARENTERAL at 17:50

## 2020-11-27 RX ADMIN — PANTOPRAZOLE SODIUM SCH MG: 40 INJECTION, POWDER, FOR SOLUTION INTRAVENOUS at 09:39

## 2020-11-27 RX ADMIN — ENOXAPARIN SODIUM SCH MG: 80 INJECTION SUBCUTANEOUS at 10:29

## 2020-11-27 RX ADMIN — DOCUSATE SODIUM SCH: 100 CAPSULE, LIQUID FILLED ORAL at 12:45

## 2020-11-27 RX ADMIN — METOCLOPRAMIDE SCH MG: 5 INJECTION, SOLUTION INTRAMUSCULAR; INTRAVENOUS at 09:39

## 2020-11-27 RX ADMIN — SUCRALFATE SCH GM: 1 TABLET ORAL at 21:28

## 2020-11-27 RX ADMIN — DOCUSATE SODIUM SCH: 100 CAPSULE, LIQUID FILLED ORAL at 17:20

## 2020-11-27 RX ADMIN — LUBIPROSTONE SCH: 24 CAPSULE, GELATIN COATED ORAL at 12:45

## 2020-11-27 RX ADMIN — PROMETHAZINE HYDROCHLORIDE PRN MG: 25 INJECTION INTRAMUSCULAR; INTRAVENOUS at 03:50

## 2020-11-27 RX ADMIN — SUCRALFATE SCH: 1 TABLET ORAL at 12:48

## 2020-11-27 RX ADMIN — METOCLOPRAMIDE SCH MG: 5 INJECTION, SOLUTION INTRAMUSCULAR; INTRAVENOUS at 21:27

## 2020-11-27 RX ADMIN — SUCRALFATE SCH: 1 TABLET ORAL at 21:59

## 2020-11-27 RX ADMIN — ACETAMINOPHEN PRN MG: 325 TABLET ORAL at 21:26

## 2020-11-27 RX ADMIN — METOCLOPRAMIDE SCH MG: 5 INJECTION, SOLUTION INTRAMUSCULAR; INTRAVENOUS at 13:05

## 2020-11-27 RX ADMIN — FAMOTIDINE SCH MG: 10 INJECTION INTRAVENOUS at 09:39

## 2020-11-27 RX ADMIN — SUCRALFATE SCH: 1 TABLET ORAL at 12:44

## 2020-11-27 RX ADMIN — METOCLOPRAMIDE SCH MG: 5 INJECTION, SOLUTION INTRAMUSCULAR; INTRAVENOUS at 15:53

## 2020-11-27 RX ADMIN — SODIUM CHLORIDE AND POTASSIUM CHLORIDE PRN MLS/HR: 9; 2.98 INJECTION, SOLUTION INTRAVENOUS at 05:35

## 2020-11-27 RX ADMIN — FAMOTIDINE SCH MG: 10 INJECTION INTRAVENOUS at 21:27

## 2020-11-27 RX ADMIN — PROMETHAZINE HYDROCHLORIDE PRN MG: 25 INJECTION INTRAMUSCULAR; INTRAVENOUS at 20:33

## 2020-11-28 LAB
ADD MANUAL DIFF: (no result)
ALBUMIN SERPL-MCNC: 1.6 G/DL (ref 3.5–5)
ALP SERPL-CCNC: 106 U/L (ref 38–126)
ANION GAP SERPL CALC-SCNC: 3 MMOL/L (ref 5–19)
ANION GAP SERPL CALC-SCNC: 4 MMOL/L (ref 5–19)
ANISOCYTOSIS BLD QL SMEAR: (no result)
AST SERPL-CCNC: 17 U/L (ref 17–59)
BASOPHILS # BLD AUTO: 0 10^3/UL (ref 0–0.2)
BASOPHILS NFR BLD AUTO: 1 % (ref 0–2)
BILIRUB DIRECT SERPL-MCNC: 0.9 MG/DL (ref 0–0.4)
BILIRUB SERPL-MCNC: 1.3 MG/DL (ref 0.2–1.3)
BUN SERPL-MCNC: 39 MG/DL (ref 7–20)
BUN SERPL-MCNC: 39 MG/DL (ref 7–20)
CALCIUM: 7.3 MG/DL (ref 8.4–10.2)
CALCIUM: 7.5 MG/DL (ref 8.4–10.2)
CHLORIDE SERPL-SCNC: 119 MMOL/L (ref 98–107)
CHLORIDE SERPL-SCNC: 119 MMOL/L (ref 98–107)
CO2 SERPL-SCNC: 21 MMOL/L (ref 22–30)
CO2 SERPL-SCNC: 22 MMOL/L (ref 22–30)
EOSINOPHIL # BLD AUTO: 0.2 10^3/UL (ref 0–0.6)
EOSINOPHIL NFR BLD AUTO: 26.3 % (ref 0–6)
ERYTHROCYTE [DISTWIDTH] IN BLOOD BY AUTOMATED COUNT: 17.7 % (ref 11.5–14)
GLUCOSE SERPL-MCNC: 95 MG/DL (ref 75–110)
GLUCOSE SERPL-MCNC: 97 MG/DL (ref 75–110)
HCT VFR BLD CALC: 23.3 % (ref 37.9–51)
HGB BLD-MCNC: 7.5 G/DL (ref 13.5–17)
INR PPP: 10.94
LYMPHOCYTES # BLD AUTO: 0.2 10^3/UL (ref 0.5–4.7)
LYMPHOCYTES NFR BLD AUTO: 28.4 % (ref 13–45)
MCH RBC QN AUTO: 28.6 PG (ref 27–33.4)
MCHC RBC AUTO-ENTMCNC: 32.3 G/DL (ref 32–36)
MCV RBC AUTO: 89 FL (ref 80–97)
MONOCYTES # BLD AUTO: 0.1 10^3/UL (ref 0.1–1.4)
MONOCYTES NFR BLD AUTO: 13.6 % (ref 3–13)
NEUTROPHILS # BLD AUTO: 0.2 10^3/UL (ref 1.7–8.2)
NEUTS SEG NFR BLD AUTO: 30.7 % (ref 42–78)
PHOSPHATE SERPL-MCNC: 2.5 MG/DL (ref 2.5–4.5)
PLATELET # BLD: 96 10^3/UL (ref 150–450)
PLATELET COMMENT: (no result)
POTASSIUM SERPL-SCNC: 3.9 MMOL/L (ref 3.6–5)
POTASSIUM SERPL-SCNC: 3.9 MMOL/L (ref 3.6–5)
PREALB SERPL-MCNC: < 3 MG/DL (ref 17.6–36)
PROT SERPL-MCNC: 3.9 G/DL (ref 6.3–8.2)
PROTHROMBIN TIME: 83.2 SEC (ref 11.4–15.4)
RBC # BLD AUTO: 2.64 10^6/UL (ref 4.35–5.55)
SCHISTOCYTES BLD QL SMEAR: SLIGHT
TOTAL CELLS COUNTED % (AUTO): 100 %
WBC # BLD AUTO: 0.8 10^3/UL (ref 4–10.5)

## 2020-11-28 RX ADMIN — METOCLOPRAMIDE SCH MG: 5 INJECTION, SOLUTION INTRAMUSCULAR; INTRAVENOUS at 15:37

## 2020-11-28 RX ADMIN — SUCRALFATE SCH: 1 TABLET ORAL at 15:01

## 2020-11-28 RX ADMIN — SUCRALFATE SCH: 1 TABLET ORAL at 16:22

## 2020-11-28 RX ADMIN — DOCUSATE SODIUM SCH: 100 CAPSULE, LIQUID FILLED ORAL at 09:31

## 2020-11-28 RX ADMIN — PROMETHAZINE HYDROCHLORIDE PRN MG: 25 INJECTION INTRAMUSCULAR; INTRAVENOUS at 06:29

## 2020-11-28 RX ADMIN — DEXAMETHASONE SODIUM PHOSPHATE PRN MG: 10 INJECTION INTRAMUSCULAR; INTRAVENOUS at 02:26

## 2020-11-28 RX ADMIN — INSULIN HUMAN SCH: 100 INJECTION, SOLUTION PARENTERAL at 02:32

## 2020-11-28 RX ADMIN — ENOXAPARIN SODIUM SCH MG: 60 INJECTION SUBCUTANEOUS at 23:05

## 2020-11-28 RX ADMIN — FLUOXETINE SCH: 20 CAPSULE ORAL at 09:31

## 2020-11-28 RX ADMIN — LUBIPROSTONE SCH: 24 CAPSULE, GELATIN COATED ORAL at 09:31

## 2020-11-28 RX ADMIN — SUCRALFATE SCH: 1 TABLET ORAL at 23:03

## 2020-11-28 RX ADMIN — DOCUSATE SODIUM SCH: 100 CAPSULE, LIQUID FILLED ORAL at 18:38

## 2020-11-28 RX ADMIN — SODIUM CHLORIDE AND POTASSIUM CHLORIDE PRN MLS/HR: 9; 2.98 INJECTION, SOLUTION INTRAVENOUS at 06:29

## 2020-11-28 RX ADMIN — PANTOPRAZOLE SODIUM SCH MG: 40 INJECTION, POWDER, FOR SOLUTION INTRAVENOUS at 09:30

## 2020-11-28 RX ADMIN — INSULIN HUMAN SCH: 100 INJECTION, SOLUTION PARENTERAL at 12:52

## 2020-11-28 RX ADMIN — METOCLOPRAMIDE SCH MG: 5 INJECTION, SOLUTION INTRAMUSCULAR; INTRAVENOUS at 12:03

## 2020-11-28 RX ADMIN — FAMOTIDINE SCH MG: 10 INJECTION INTRAVENOUS at 09:29

## 2020-11-28 RX ADMIN — METOCLOPRAMIDE SCH MG: 5 INJECTION, SOLUTION INTRAMUSCULAR; INTRAVENOUS at 23:05

## 2020-11-28 RX ADMIN — PROMETHAZINE HYDROCHLORIDE PRN MG: 25 INJECTION INTRAMUSCULAR; INTRAVENOUS at 12:36

## 2020-11-28 RX ADMIN — ACETAMINOPHEN PRN MG: 325 TABLET ORAL at 17:30

## 2020-11-28 RX ADMIN — INSULIN HUMAN SCH: 100 INJECTION, SOLUTION PARENTERAL at 06:30

## 2020-11-28 RX ADMIN — DEXAMETHASONE SODIUM PHOSPHATE PRN MG: 10 INJECTION INTRAMUSCULAR; INTRAVENOUS at 17:29

## 2020-11-28 RX ADMIN — Medication PRN MG: at 23:04

## 2020-11-28 RX ADMIN — PROMETHAZINE HYDROCHLORIDE PRN MG: 25 INJECTION INTRAMUSCULAR; INTRAVENOUS at 23:05

## 2020-11-28 RX ADMIN — SUCRALFATE SCH: 1 TABLET ORAL at 09:31

## 2020-11-28 RX ADMIN — INSULIN HUMAN SCH: 100 INJECTION, SOLUTION PARENTERAL at 18:38

## 2020-11-28 RX ADMIN — METOCLOPRAMIDE SCH MG: 5 INJECTION, SOLUTION INTRAMUSCULAR; INTRAVENOUS at 09:30

## 2020-11-29 LAB
ADD MANUAL DIFF: (no result)
ALBUMIN SERPL-MCNC: 1.6 G/DL (ref 3.5–5)
ALP SERPL-CCNC: 114 U/L (ref 38–126)
ANION GAP SERPL CALC-SCNC: 7 MMOL/L (ref 5–19)
ANISOCYTOSIS BLD QL SMEAR: (no result)
AST SERPL-CCNC: 19 U/L (ref 17–59)
BASOPHILS # BLD AUTO: 0 10^3/UL (ref 0–0.2)
BASOPHILS NFR BLD AUTO: 0.4 % (ref 0–2)
BILIRUB DIRECT SERPL-MCNC: 0.9 MG/DL (ref 0–0.4)
BILIRUB SERPL-MCNC: 1.3 MG/DL (ref 0.2–1.3)
BUN SERPL-MCNC: 43 MG/DL (ref 7–20)
CALCIUM: 7.6 MG/DL (ref 8.4–10.2)
CHLORIDE SERPL-SCNC: 121 MMOL/L (ref 98–107)
CO2 SERPL-SCNC: 18 MMOL/L (ref 22–30)
EOSINOPHIL # BLD AUTO: 0.2 10^3/UL (ref 0–0.6)
EOSINOPHIL NFR BLD AUTO: 23.1 % (ref 0–6)
ERYTHROCYTE [DISTWIDTH] IN BLOOD BY AUTOMATED COUNT: 17.7 % (ref 11.5–14)
GLUCOSE SERPL-MCNC: 128 MG/DL (ref 75–110)
HCT VFR BLD CALC: 23.3 % (ref 37.9–51)
HGB BLD-MCNC: 7.6 G/DL (ref 13.5–17)
LYMPHOCYTES # BLD AUTO: 0.2 10^3/UL (ref 0.5–4.7)
LYMPHOCYTES NFR BLD AUTO: 24.5 % (ref 13–45)
MCH RBC QN AUTO: 28.8 PG (ref 27–33.4)
MCHC RBC AUTO-ENTMCNC: 32.5 G/DL (ref 32–36)
MCV RBC AUTO: 89 FL (ref 80–97)
MONOCYTES # BLD AUTO: 0.2 10^3/UL (ref 0.1–1.4)
MONOCYTES NFR BLD AUTO: 19 % (ref 3–13)
NEUTROPHILS # BLD AUTO: 0.3 10^3/UL (ref 1.7–8.2)
NEUTS SEG NFR BLD AUTO: 33 % (ref 42–78)
PHOSPHATE SERPL-MCNC: 2.3 MG/DL (ref 2.5–4.5)
PLATELET # BLD: 139 10^3/UL (ref 150–450)
PLATELET CLUMP BLD QL SMEAR: PRESENT
PLATELET COMMENT: (no result)
POTASSIUM SERPL-SCNC: 4.2 MMOL/L (ref 3.6–5)
PREALB SERPL-MCNC: < 3 MG/DL (ref 17.6–36)
PROT SERPL-MCNC: 3.8 G/DL (ref 6.3–8.2)
RBC # BLD AUTO: 2.62 10^6/UL (ref 4.35–5.55)
SCHISTOCYTES BLD QL SMEAR: SLIGHT
TARGETS BLD QL SMEAR: SLIGHT
TOTAL CELLS COUNTED % (AUTO): 100 %
WBC # BLD AUTO: 1 10^3/UL (ref 4–10.5)

## 2020-11-29 RX ADMIN — METOCLOPRAMIDE SCH MG: 5 INJECTION, SOLUTION INTRAMUSCULAR; INTRAVENOUS at 10:49

## 2020-11-29 RX ADMIN — ENOXAPARIN SODIUM SCH MG: 60 INJECTION SUBCUTANEOUS at 22:18

## 2020-11-29 RX ADMIN — FLUOXETINE SCH: 20 CAPSULE ORAL at 11:43

## 2020-11-29 RX ADMIN — SUCRALFATE SCH: 1 TABLET ORAL at 15:33

## 2020-11-29 RX ADMIN — DOCUSATE SODIUM SCH MG: 100 CAPSULE, LIQUID FILLED ORAL at 11:33

## 2020-11-29 RX ADMIN — DOCUSATE SODIUM SCH: 100 CAPSULE, LIQUID FILLED ORAL at 11:43

## 2020-11-29 RX ADMIN — DEXAMETHASONE SODIUM PHOSPHATE PRN MG: 10 INJECTION INTRAMUSCULAR; INTRAVENOUS at 18:04

## 2020-11-29 RX ADMIN — LUBIPROSTONE SCH: 24 CAPSULE, GELATIN COATED ORAL at 11:43

## 2020-11-29 RX ADMIN — SUCRALFATE SCH: 1 TABLET ORAL at 11:41

## 2020-11-29 RX ADMIN — FENTANYL TRANSDERMAL SCH EACH: 25 PATCH, EXTENDED RELEASE TRANSDERMAL at 15:27

## 2020-11-29 RX ADMIN — DOCUSATE SODIUM SCH: 100 CAPSULE, LIQUID FILLED ORAL at 18:04

## 2020-11-29 RX ADMIN — PROMETHAZINE HYDROCHLORIDE PRN MG: 25 INJECTION INTRAMUSCULAR; INTRAVENOUS at 05:34

## 2020-11-29 RX ADMIN — FLUOXETINE SCH MG: 20 CAPSULE ORAL at 11:32

## 2020-11-29 RX ADMIN — INSULIN HUMAN SCH: 100 INJECTION, SOLUTION PARENTERAL at 18:07

## 2020-11-29 RX ADMIN — DEXAMETHASONE SODIUM PHOSPHATE PRN MG: 10 INJECTION INTRAMUSCULAR; INTRAVENOUS at 02:35

## 2020-11-29 RX ADMIN — SUCRALFATE SCH: 1 TABLET ORAL at 08:19

## 2020-11-29 RX ADMIN — INSULIN HUMAN SCH UNIT: 100 INJECTION, SOLUTION PARENTERAL at 06:40

## 2020-11-29 RX ADMIN — INSULIN HUMAN SCH: 100 INJECTION, SOLUTION PARENTERAL at 00:29

## 2020-11-29 RX ADMIN — METOCLOPRAMIDE SCH MG: 5 INJECTION, SOLUTION INTRAMUSCULAR; INTRAVENOUS at 07:55

## 2020-11-29 RX ADMIN — INSULIN HUMAN SCH: 100 INJECTION, SOLUTION PARENTERAL at 02:07

## 2020-11-29 RX ADMIN — METOCLOPRAMIDE SCH MG: 5 INJECTION, SOLUTION INTRAMUSCULAR; INTRAVENOUS at 15:25

## 2020-11-29 RX ADMIN — METOCLOPRAMIDE SCH MG: 5 INJECTION, SOLUTION INTRAMUSCULAR; INTRAVENOUS at 22:18

## 2020-11-29 RX ADMIN — SUCRALFATE SCH: 1 TABLET ORAL at 22:32

## 2020-11-29 RX ADMIN — INSULIN HUMAN SCH: 100 INJECTION, SOLUTION PARENTERAL at 13:14

## 2020-11-29 RX ADMIN — PROMETHAZINE HYDROCHLORIDE PRN MG: 25 INJECTION INTRAMUSCULAR; INTRAVENOUS at 20:16

## 2020-11-29 RX ADMIN — ENOXAPARIN SODIUM SCH MG: 60 INJECTION SUBCUTANEOUS at 10:49

## 2020-11-29 RX ADMIN — PANTOPRAZOLE SODIUM SCH MG: 40 INJECTION, POWDER, FOR SOLUTION INTRAVENOUS at 10:49

## 2020-11-29 RX ADMIN — LUBIPROSTONE SCH MCG: 24 CAPSULE, GELATIN COATED ORAL at 11:33

## 2020-11-30 LAB
ALBUMIN SERPL-MCNC: 1.6 G/DL (ref 3.5–5)
ALP SERPL-CCNC: 152 U/L (ref 38–126)
ANION GAP SERPL CALC-SCNC: 8 MMOL/L (ref 5–19)
ANION GAP SERPL CALC-SCNC: 8 MMOL/L (ref 5–19)
AST SERPL-CCNC: 17 U/L (ref 17–59)
BILIRUB DIRECT SERPL-MCNC: 0.7 MG/DL (ref 0–0.4)
BILIRUB SERPL-MCNC: 1 MG/DL (ref 0.2–1.3)
BUN SERPL-MCNC: 40 MG/DL (ref 7–20)
BUN SERPL-MCNC: 40 MG/DL (ref 7–20)
CALCIUM: 8 MG/DL (ref 8.4–10.2)
CALCIUM: 8.1 MG/DL (ref 8.4–10.2)
CHLORIDE SERPL-SCNC: 121 MMOL/L (ref 98–107)
CHLORIDE SERPL-SCNC: 121 MMOL/L (ref 98–107)
CO2 SERPL-SCNC: 20 MMOL/L (ref 22–30)
CO2 SERPL-SCNC: 20 MMOL/L (ref 22–30)
ERYTHROCYTE [DISTWIDTH] IN BLOOD BY AUTOMATED COUNT: 18.2 % (ref 11.5–14)
ERYTHROCYTE [DISTWIDTH] IN BLOOD BY AUTOMATED COUNT: 18.4 % (ref 11.5–14)
GLUCOSE SERPL-MCNC: 118 MG/DL (ref 75–110)
GLUCOSE SERPL-MCNC: 118 MG/DL (ref 75–110)
HCT VFR BLD CALC: 22.8 % (ref 37.9–51)
HCT VFR BLD CALC: 23.5 % (ref 37.9–51)
HGB BLD-MCNC: 7.3 G/DL (ref 13.5–17)
HGB BLD-MCNC: 7.7 G/DL (ref 13.5–17)
INR PPP: 1.41
MCH RBC QN AUTO: 28.1 PG (ref 27–33.4)
MCH RBC QN AUTO: 29 PG (ref 27–33.4)
MCHC RBC AUTO-ENTMCNC: 31.9 G/DL (ref 32–36)
MCHC RBC AUTO-ENTMCNC: 32.6 G/DL (ref 32–36)
MCV RBC AUTO: 88 FL (ref 80–97)
MCV RBC AUTO: 89 FL (ref 80–97)
PATH REV BLD -IMP: (no result)
PHOSPHATE SERPL-MCNC: 1.8 MG/DL (ref 2.5–4.5)
PLATELET # BLD: 128 10^3/UL (ref 150–450)
PLATELET # BLD: 132 10^3/UL (ref 150–450)
POTASSIUM SERPL-SCNC: 4 MMOL/L (ref 3.6–5)
POTASSIUM SERPL-SCNC: 4 MMOL/L (ref 3.6–5)
PREALB SERPL-MCNC: < 3 MG/DL (ref 17.6–36)
PROT SERPL-MCNC: 3.9 G/DL (ref 6.3–8.2)
PROTHROMBIN TIME: 17.5 SEC (ref 11.4–15.4)
RBC # BLD AUTO: 2.59 10^6/UL (ref 4.35–5.55)
RBC # BLD AUTO: 2.64 10^6/UL (ref 4.35–5.55)
WBC # BLD AUTO: 1.6 10^3/UL (ref 4–10.5)
WBC # BLD AUTO: 11.2 10^3/UL (ref 4–10.5)
WBC # BLD AUTO: 6 10^3/UL (ref 4–10.5)

## 2020-11-30 RX ADMIN — FLUOXETINE SCH: 20 CAPSULE ORAL at 10:06

## 2020-11-30 RX ADMIN — METOCLOPRAMIDE SCH MG: 5 INJECTION, SOLUTION INTRAMUSCULAR; INTRAVENOUS at 22:49

## 2020-11-30 RX ADMIN — LORAZEPAM PRN MG: 2 INJECTION INTRAMUSCULAR; INTRAVENOUS at 05:59

## 2020-11-30 RX ADMIN — INSULIN HUMAN SCH: 100 INJECTION, SOLUTION PARENTERAL at 00:25

## 2020-11-30 RX ADMIN — INSULIN HUMAN SCH: 100 INJECTION, SOLUTION PARENTERAL at 12:16

## 2020-11-30 RX ADMIN — PANTOPRAZOLE SODIUM SCH MG: 40 INJECTION, POWDER, FOR SOLUTION INTRAVENOUS at 10:41

## 2020-11-30 RX ADMIN — SUCRALFATE SCH: 1 TABLET ORAL at 21:59

## 2020-11-30 RX ADMIN — DOCUSATE SODIUM SCH: 100 CAPSULE, LIQUID FILLED ORAL at 17:36

## 2020-11-30 RX ADMIN — DEXAMETHASONE SODIUM PHOSPHATE PRN MG: 10 INJECTION INTRAMUSCULAR; INTRAVENOUS at 05:51

## 2020-11-30 RX ADMIN — INSULIN HUMAN SCH: 100 INJECTION, SOLUTION PARENTERAL at 06:24

## 2020-11-30 RX ADMIN — MORPHINE SULFATE PRN MG: 10 INJECTION INTRAMUSCULAR; INTRAVENOUS; SUBCUTANEOUS at 13:52

## 2020-11-30 RX ADMIN — SUCRALFATE SCH: 1 TABLET ORAL at 10:06

## 2020-11-30 RX ADMIN — LORAZEPAM PRN MG: 2 INJECTION INTRAMUSCULAR; INTRAVENOUS at 13:52

## 2020-11-30 RX ADMIN — ENOXAPARIN SODIUM SCH MG: 60 INJECTION SUBCUTANEOUS at 10:41

## 2020-11-30 RX ADMIN — INSULIN HUMAN SCH UNIT: 100 INJECTION, SOLUTION PARENTERAL at 18:35

## 2020-11-30 RX ADMIN — LORAZEPAM PRN MG: 2 INJECTION INTRAMUSCULAR; INTRAVENOUS at 22:49

## 2020-11-30 RX ADMIN — SUCRALFATE SCH: 1 TABLET ORAL at 08:31

## 2020-11-30 RX ADMIN — ENOXAPARIN SODIUM SCH MG: 60 INJECTION SUBCUTANEOUS at 22:50

## 2020-11-30 RX ADMIN — METOCLOPRAMIDE SCH MG: 5 INJECTION, SOLUTION INTRAMUSCULAR; INTRAVENOUS at 17:37

## 2020-11-30 RX ADMIN — LUBIPROSTONE SCH: 24 CAPSULE, GELATIN COATED ORAL at 10:05

## 2020-11-30 RX ADMIN — METOCLOPRAMIDE SCH MG: 5 INJECTION, SOLUTION INTRAMUSCULAR; INTRAVENOUS at 08:32

## 2020-11-30 RX ADMIN — METOCLOPRAMIDE SCH MG: 5 INJECTION, SOLUTION INTRAMUSCULAR; INTRAVENOUS at 10:43

## 2020-11-30 RX ADMIN — DOCUSATE SODIUM SCH: 100 CAPSULE, LIQUID FILLED ORAL at 10:05

## 2020-11-30 RX ADMIN — SUCRALFATE SCH: 1 TABLET ORAL at 16:47

## 2020-12-01 LAB — PATH REV BLD -IMP: (no result)

## 2020-12-01 RX ADMIN — INSULIN HUMAN SCH: 100 INJECTION, SOLUTION PARENTERAL at 12:49

## 2020-12-01 RX ADMIN — METOCLOPRAMIDE SCH MG: 5 INJECTION, SOLUTION INTRAMUSCULAR; INTRAVENOUS at 08:25

## 2020-12-01 RX ADMIN — SUCRALFATE SCH: 1 TABLET ORAL at 16:47

## 2020-12-01 RX ADMIN — ENOXAPARIN SODIUM SCH MG: 60 INJECTION SUBCUTANEOUS at 11:16

## 2020-12-01 RX ADMIN — LORAZEPAM PRN MG: 2 INJECTION INTRAMUSCULAR; INTRAVENOUS at 08:48

## 2020-12-01 RX ADMIN — MORPHINE SULFATE PRN MG: 10 INJECTION INTRAMUSCULAR; INTRAVENOUS; SUBCUTANEOUS at 14:53

## 2020-12-01 RX ADMIN — LUBIPROSTONE SCH: 24 CAPSULE, GELATIN COATED ORAL at 10:01

## 2020-12-01 RX ADMIN — DOCUSATE SODIUM SCH: 100 CAPSULE, LIQUID FILLED ORAL at 10:02

## 2020-12-01 RX ADMIN — METOCLOPRAMIDE SCH MG: 5 INJECTION, SOLUTION INTRAMUSCULAR; INTRAVENOUS at 17:47

## 2020-12-01 RX ADMIN — INSULIN HUMAN SCH: 100 INJECTION, SOLUTION PARENTERAL at 17:27

## 2020-12-01 RX ADMIN — SUCRALFATE SCH: 1 TABLET ORAL at 11:16

## 2020-12-01 RX ADMIN — MORPHINE SULFATE PRN MG: 10 INJECTION INTRAMUSCULAR; INTRAVENOUS; SUBCUTANEOUS at 08:52

## 2020-12-01 RX ADMIN — LORAZEPAM PRN MG: 2 INJECTION INTRAMUSCULAR; INTRAVENOUS at 04:48

## 2020-12-01 RX ADMIN — SUCRALFATE SCH: 1 TABLET ORAL at 08:18

## 2020-12-01 RX ADMIN — METOCLOPRAMIDE SCH MG: 5 INJECTION, SOLUTION INTRAMUSCULAR; INTRAVENOUS at 11:16

## 2020-12-01 RX ADMIN — LORAZEPAM PRN MG: 2 INJECTION INTRAMUSCULAR; INTRAVENOUS at 20:39

## 2020-12-01 RX ADMIN — LORAZEPAM PRN MG: 2 INJECTION INTRAMUSCULAR; INTRAVENOUS at 14:54

## 2020-12-01 RX ADMIN — INSULIN HUMAN SCH: 100 INJECTION, SOLUTION PARENTERAL at 23:15

## 2020-12-01 RX ADMIN — DOCUSATE SODIUM SCH: 100 CAPSULE, LIQUID FILLED ORAL at 17:27

## 2020-12-01 RX ADMIN — SUCRALFATE SCH: 1 TABLET ORAL at 21:15

## 2020-12-01 RX ADMIN — INSULIN HUMAN SCH: 100 INJECTION, SOLUTION PARENTERAL at 00:14

## 2020-12-01 RX ADMIN — FLUOXETINE SCH: 20 CAPSULE ORAL at 10:02

## 2020-12-01 RX ADMIN — INSULIN HUMAN SCH: 100 INJECTION, SOLUTION PARENTERAL at 06:02

## 2020-12-01 RX ADMIN — MORPHINE SULFATE PRN MG: 10 INJECTION INTRAMUSCULAR; INTRAVENOUS; SUBCUTANEOUS at 17:50

## 2020-12-01 RX ADMIN — METOCLOPRAMIDE SCH MG: 5 INJECTION, SOLUTION INTRAMUSCULAR; INTRAVENOUS at 21:25

## 2020-12-01 RX ADMIN — ENOXAPARIN SODIUM SCH: 60 INJECTION SUBCUTANEOUS at 21:19

## 2020-12-01 RX ADMIN — PANTOPRAZOLE SODIUM SCH MG: 40 INJECTION, POWDER, FOR SOLUTION INTRAVENOUS at 11:16

## 2020-12-01 RX ADMIN — PROMETHAZINE HYDROCHLORIDE PRN MG: 25 INJECTION INTRAMUSCULAR; INTRAVENOUS at 04:48

## 2020-12-01 RX ADMIN — MORPHINE SULFATE PRN MG: 10 INJECTION INTRAMUSCULAR; INTRAVENOUS; SUBCUTANEOUS at 20:38

## 2020-12-01 NOTE — ADVANCED CARE
- Diagnosis


(1) Nausea & vomiting


Diagnosis Current: Yes   





(2) Pancreatic cancer metastasized to liver


Diagnosis Current: Yes   


Resuscitation Status: Do Not Resuscitate - d/w family who agrees

## 2020-12-02 RX ADMIN — ENOXAPARIN SODIUM SCH: 60 INJECTION SUBCUTANEOUS at 10:58

## 2020-12-02 RX ADMIN — DOCUSATE SODIUM SCH: 100 CAPSULE, LIQUID FILLED ORAL at 10:58

## 2020-12-02 RX ADMIN — MORPHINE SULFATE PRN MG: 10 SOLUTION ORAL at 23:07

## 2020-12-02 RX ADMIN — FLUOXETINE SCH: 20 CAPSULE ORAL at 10:59

## 2020-12-02 RX ADMIN — METOCLOPRAMIDE SCH MG: 5 INJECTION, SOLUTION INTRAMUSCULAR; INTRAVENOUS at 15:54

## 2020-12-02 RX ADMIN — MORPHINE SULFATE PRN MG: 10 INJECTION INTRAMUSCULAR; INTRAVENOUS; SUBCUTANEOUS at 08:30

## 2020-12-02 RX ADMIN — LUBIPROSTONE SCH: 24 CAPSULE, GELATIN COATED ORAL at 10:58

## 2020-12-02 RX ADMIN — LORAZEPAM PRN MG: 1 TABLET ORAL at 21:41

## 2020-12-02 RX ADMIN — ENOXAPARIN SODIUM SCH: 60 INJECTION SUBCUTANEOUS at 21:03

## 2020-12-02 RX ADMIN — LORAZEPAM PRN MG: 2 INJECTION INTRAMUSCULAR; INTRAVENOUS at 06:03

## 2020-12-02 RX ADMIN — PANTOPRAZOLE SODIUM SCH: 40 INJECTION, POWDER, FOR SOLUTION INTRAVENOUS at 10:59

## 2020-12-02 RX ADMIN — METOCLOPRAMIDE SCH: 5 INJECTION, SOLUTION INTRAMUSCULAR; INTRAVENOUS at 15:53

## 2020-12-02 RX ADMIN — MORPHINE SULFATE PRN MG: 10 INJECTION INTRAMUSCULAR; INTRAVENOUS; SUBCUTANEOUS at 10:53

## 2020-12-02 RX ADMIN — INSULIN HUMAN SCH: 100 INJECTION, SOLUTION PARENTERAL at 05:30

## 2020-12-02 RX ADMIN — INSULIN HUMAN SCH: 100 INJECTION, SOLUTION PARENTERAL at 16:44

## 2020-12-02 RX ADMIN — SUCRALFATE SCH: 1 TABLET ORAL at 21:03

## 2020-12-02 RX ADMIN — LORAZEPAM PRN MG: 2 INJECTION INTRAMUSCULAR; INTRAVENOUS at 10:53

## 2020-12-02 RX ADMIN — SUCRALFATE SCH: 1 TABLET ORAL at 07:57

## 2020-12-02 RX ADMIN — SUCRALFATE SCH: 1 TABLET ORAL at 16:44

## 2020-12-02 RX ADMIN — METOCLOPRAMIDE SCH: 5 INJECTION, SOLUTION INTRAMUSCULAR; INTRAVENOUS at 21:03

## 2020-12-02 RX ADMIN — MORPHINE SULFATE PRN MG: 10 INJECTION INTRAMUSCULAR; INTRAVENOUS; SUBCUTANEOUS at 06:04

## 2020-12-02 RX ADMIN — DOCUSATE SODIUM SCH: 100 CAPSULE, LIQUID FILLED ORAL at 19:59

## 2020-12-02 RX ADMIN — INSULIN HUMAN SCH: 100 INJECTION, SOLUTION PARENTERAL at 19:59

## 2020-12-02 RX ADMIN — SUCRALFATE SCH: 1 TABLET ORAL at 10:59

## 2020-12-02 RX ADMIN — METOCLOPRAMIDE SCH MG: 5 INJECTION, SOLUTION INTRAMUSCULAR; INTRAVENOUS at 08:30

## 2020-12-02 RX ADMIN — MORPHINE SULFATE PRN MG: 10 INJECTION INTRAMUSCULAR; INTRAVENOUS; SUBCUTANEOUS at 01:51

## 2020-12-02 RX ADMIN — LORAZEPAM PRN MG: 2 INJECTION INTRAMUSCULAR; INTRAVENOUS at 01:51

## 2020-12-02 RX ADMIN — FENTANYL TRANSDERMAL SCH EACH: 25 PATCH, EXTENDED RELEASE TRANSDERMAL at 15:52

## 2020-12-02 RX ADMIN — MORPHINE SULFATE PRN MG: 10 SOLUTION ORAL at 20:00

## 2020-12-03 VITALS — SYSTOLIC BLOOD PRESSURE: 129 MMHG | DIASTOLIC BLOOD PRESSURE: 82 MMHG

## 2020-12-03 RX ADMIN — MORPHINE SULFATE PRN MG: 10 SOLUTION ORAL at 06:10

## 2020-12-03 RX ADMIN — MORPHINE SULFATE PRN MG: 10 SOLUTION ORAL at 02:10

## 2020-12-03 RX ADMIN — INSULIN HUMAN SCH: 100 INJECTION, SOLUTION PARENTERAL at 06:00

## 2020-12-03 RX ADMIN — LORAZEPAM PRN MG: 1 TABLET ORAL at 00:02

## 2020-12-03 RX ADMIN — LORAZEPAM PRN MG: 1 TABLET ORAL at 04:45

## 2020-12-03 RX ADMIN — INSULIN HUMAN SCH: 100 INJECTION, SOLUTION PARENTERAL at 00:01

## 2020-12-03 RX ADMIN — LORAZEPAM PRN MG: 1 TABLET ORAL at 08:34

## 2020-12-03 RX ADMIN — LORAZEPAM PRN MG: 1 TABLET ORAL at 08:32

## 2020-12-03 NOTE — PDOC DISCHARGE SUMMARY
Impression





- Admit/DC Date/PCP


Admission Date/Primary Care Provider: 


  11/16/20 06:37





  





Discharge Date: 12/03/20





- Discharge Diagnosis


(1) Dysphagia


Is this a current diagnosis for this admission?: Yes   





(2) Pancreatic cancer metastasized to liver


Is this a current diagnosis for this admission?: Yes   





(3) GIUSEPPE (acute kidney injury)


Is this a current diagnosis for this admission?: Yes   





(4) Acute thrombosis of left subclavian vein


Is this a current diagnosis for this admission?: Yes   





(5) DVT of left axillary vein, acute


Is this a current diagnosis for this admission?: Yes   





(6) Malnutrition


Is this a current diagnosis for this admission?: Yes   





- Assessment


Summary: 




















Comfort Measures


-Patient/MPOA in agreement it is in the patient's best interests to change goals

of care to comfort


-DNR/DNI CODE STATUS


-Comfort medications ordered for pain/anxiety/nausea


-Disposition plan for hospice on 12/3 after DME is delivered to home





























(1) GIUSEPPE (acute kidney injury)


Resolved.  Monitor renal function





(2) ruled out coagulase negative Staphylococcus bacteremia, contaminated blood 

culture


Per Previous Physician:


"Patient has Staph epi in 1/2 bottles from a set and Staph Simulans in another 

bottle from another set 


While it is unclear whether this is a true bacteremia or contaminant but 

polymicrobial nature strongly suggests contaminant.


We stopped antibiotics 11/25/2020.  Continue to monitor off antibiotic."


Extremely unlikely patient actually has bacteremia given culture results with 

multiple different organisms in different bottles.





(3) DVT of left axillary vein, acute


Per Previous Physician:


"Patient is on therapeutic Lovenox.  Arm elevation.  


Hematology recommends holding Lovenox for platelet count less than 50"





(4) Leukocytosis


Per Previous Physician:


"Etiology is questionable at this time.  Monitor CBC.  


Dr. Javier agrees it could be due to malignancy or DVT but should cover with 

antibiotics for a couple of days just in case there is an occult infection.  He 

is on vancomycin and ceftriaxone.  


Chest x-ray on my interpretation of the images does not seem to show any right 

basilar infiltrates but does show elevated right hemidiaphragm likely due to 

hepatomegaly and some fullness in his perihilar right middle lobe region.


Currently patient is neutropenic.  He is afebrile so far.  Reverse contact 

precautions."


Improved





5) Pain of left calf


Per Previous Physician:


"Pain control as needed.  He possibly could have a DVT this well but he is 

already on therapeutic Lovenox for the DVT in his arm.  Also could be cramping 

from which is not unusual from his chemo regimen. He does have baclofen ordered 

also for hiccups."





(6) Hypotension


resolved





(7) Malnutrition


Per Previous Physician:


"Secondary to malignancy, nausea/vomiting.


Prealbumin is quite low.  Was seen by the dietitian who recommended chopped diet

which he is on.  Ensure supplements.  


Calorie count.


Nausea and vomiting improved.  Tolerating TPN."


Patient and family do not want a feeding tube, unlikely he would survive the 

procedure at this point 





(8) dysphagia, nausea & vomiting


Per Previous Physician:


"Antiemetics as needed.  Continue IV fluids.  Monitor electrolytes. "


N.p.o.


Speech therapy following





(9) Pancreatic cancer metastasized to liver


Per Previous Physician:


"Oncology is following.  Patient has completed first cycle of FOLFIRINOX 11/8 

with 5FU infusion concluded 11/20. 


Continue oxycodone for patient's neoplasm related abdominal pain.  On Colace and

lubiprostone.  MiraLAX as needed.  


His physical status has declined. Patient continues to struggle with nutrition. 

He is also very fatigued and unable to do much with PT."


Has been taking palliative chemotherapy prior to admission











- Additional Information


Resuscitation Status: Do Not Resuscitate - d/w family who agrees


Discharge Diet: Other (Comments) - comfort feeding as tolerated/desired


Discharge Activity: Bedrest


Referrals: 


CHERRY JAVIER MD [ACTIVE STAFF] - 12/15/20 9:30 am


Home Medications: 








Ondansetron [Zofran Odt 4 mg Tablet] 8 mg PO Q8HP PRN 11/05/20 


Promethazine HCl [Phenadoz] 1 supp WI Q6HP PRN 11/16/20 











History of Present Illiness


History of Present Illness: 


LEON WARNER is a 50 year old male








Physical Exam


Vital Signs: 


                                        











Temp Pulse Resp BP Pulse Ox


 


 97.6 F   96   26 H  129/82 H  96 


 


 12/03/20 09:59  12/03/20 08:44  12/03/20 08:44  12/03/20 08:44  12/03/20 08:44








                                 Intake & Output











 12/02/20 12/03/20 12/04/20





 06:59 06:59 06:59


 


Intake Total 20  


 


Balance 20  


 


Weight 64 kg 63.5 kg 











Exam: 





General appearance: PRESENT: no acute distress, frail acutely and chronically 

ill-appearing white male, appears comfortable today


Head exam: PRESENT: atraumatic, normocephalic


Eye exam: PRESENT: conjunctiva pink.  ABSENT: scleral icterus


Mouth exam: PRESENT: moist


Respiratory exam: PRESENT: clear to auscultation destiny.  ABSENT: rales, rhonchi, 

wheezes


Cardiovascular exam: PRESENT: RRR.  ABSENT: diastolic murmur, rubs, systolic 

murmur


GI/Abdominal exam: PRESENT: normal bowel sounds, exquisitely tender abdomen 

diffusely ABSENT: distended, mass


Neurological exam: PRESENT: Not alert or awake


Psychiatric exam: PRESENT: appropriate affect, normal mood


Skin exam: PRESENT: dry, intact, warm








Results


Laboratory Results: 


                                        











WBC  11.2 10^3/uL (4.0-10.5)  H  11/30/20  17:30    


 


RBC  2.59 10^6/uL (4.35-5.55)  L  11/30/20  17:30    


 


Hgb  7.3 g/dL (13.5-17.0)  L  11/30/20  17:30    


 


Hct  22.8 % (37.9-51.0)  L  11/30/20  17:30    


 


MCV  88 fl (80-97)   11/30/20  17:30    


 


MCH  28.1 pg (27.0-33.4)   11/30/20  17:30    


 


MCHC  31.9 g/dL (32.0-36.0)  L  11/30/20  17:30    


 


RDW  18.2 % (11.5-14.0)  H  11/30/20  17:30    


 


Plt Count  128 10^3/uL (150-450)  L  11/30/20  17:30    


 


Lymph % (Auto)  24.5 % (13-45)   11/29/20  03:15    


 


Mono % (Auto)  19.0 % (3-13)  H  11/29/20  03:15    


 


Eos % (Auto)  23.1 % (0-6)  H  11/29/20  03:15    


 


Baso % (Auto)  0.4 % (0-2)   11/29/20  03:15    


 


Absolute Neuts (auto)  0.3 10^3/uL (1.7-8.2)  L  11/29/20  03:15    


 


Absolute Lymphs (auto)  0.2 10^3/uL (0.5-4.7)  L  11/29/20  03:15    


 


Absolute Monos (auto)  0.2 10^3/uL (0.1-1.4)   11/29/20  03:15    


 


Absolute Eos (auto)  0.2 10^3/uL (0.0-0.6)   11/29/20  03:15    


 


Absolute Basos (auto)  0.0 10^3/uL (0.0-0.2)   11/29/20  03:15    


 


Total Counted  100   11/25/20  05:47    


 


Seg Neutrophils %  33.0 % (42-78)  L  11/29/20  03:15    


 


Seg Neuts % (Manual)  81 % (42-78)  H  11/25/20  05:47    


 


Band Neutrophils %  1 % (3-5)  L  11/25/20  05:47    


 


Lymphocytes % (Manual)  7 % (13-45)  L  11/25/20  05:47    


 


Atypical Lymphs %  1 % (0)   11/25/20  05:47    


 


Monocytes % (Manual)  1 % (3-13)  L  11/25/20  05:47    


 


Eosinophils % (Manual)  9 % (0-6)  H  11/25/20  05:47    


 


Basophils % (Manual)  0 % (0-2)   11/25/20  05:47    


 


Abs Neuts (Manual)  6.0 10^3/uL (1.7-8.2)   11/25/20  05:47    


 


Abs Lymphs (Manual)  0.6 10^3/uL (0.5-4.7)   11/25/20  05:47    


 


Abs Monocytes (Manual)  0.1 10^3/uL (0.1-1.4)   11/25/20  05:47    


 


Absolute Eos (Manual)  0.7 10^3/uL (0.0-0.6)  H  11/25/20  05:47    


 


Abs Basophils (Manual)  0.0 10^3/uL (0.0-0.2)   11/25/20  05:47    


 


Hypersegmented Neuts  PRESENT   11/23/20  07:36    


 


Toxic Granulation  SLIGHT   11/25/20  05:47    


 


Toxic Vacuolation  PRESENT   11/17/20  09:41    


 


Platelet Estimate  Cancelled   11/27/20  16:35    


 


Clumped Platelets  PRESENT   11/29/20  03:15    


 


Platelet Comment  DECREASED   11/29/20  03:15    


 


Polychromasia  SLIGHT   11/25/20  05:47    


 


Hypochromasia  1+   11/20/20  05:40    


 


Poikilocytosis  SLIGHT   11/25/20  05:47    


 


Anisocytosis  1+   11/29/20  03:15    


 


Microcytosis  1+   11/20/20  05:40    


 


Target Cells  SLIGHT   11/29/20  03:15    


 


Tear Drop Cells  SLIGHT   11/25/20  05:47    


 


Rouleaux  SLIGHT   11/23/20  07:36    


 


Schistocytes  SLIGHT   11/29/20  03:15    


 


PT  17.5 SEC (11.4-15.4)  H D 11/30/20  07:52    


 


INR  1.41   11/30/20  07:52    


 


INR (Anticoag Therapy)  Cancelled   11/30/20  04:35    


 


APTT  53.1 SEC (23.5-35.8)  H  11/29/20  03:15    


 


Carbonic Acid  1.08 mmol/L (1.05-1.35)   11/26/20  12:40    


 


HCO3/H2CO3 Ratio  18:1   11/26/20  12:40    


 


ABG pH  7.35  (7.35-7.45)   11/26/20  12:40    


 


ABG pCO2  35.9 mmHg (35-45)   11/26/20  12:40    


 


ABG pO2  86.7 mmHg ()   11/26/20  12:40    


 


ABG HCO3  19.5 mmol/L (20-24)  L  11/26/20  12:40    


 


ABG Total CO2  20.6 mmol/L (23-27)  L  11/26/20  12:40    


 


ABG O2 Saturation  96.3 % (94-98)   11/26/20  12:40    


 


ABG Base Excess  -5.4 mmol/L  11/26/20  12:40    


 


VBG pH  7.39  (7.30-7.42)   11/16/20  03:25    


 


VBG pCO2  37.8 mmHg (35-63)   11/16/20  03:25    


 


VBG HCO3  22.4 mmol/L (20-32)   11/16/20  03:25    


 


VBG Base Excess  -2.2 mmol/L  11/16/20  03:25    


 


FiO2  3L   11/26/20  12:40    


 


Sodium  148.7 mmol/L (137-145)  H  11/30/20  04:35    


 


Potassium  4.0 mmol/L (3.6-5.0)   11/30/20  04:35    


 


Chloride  121 mmol/L ()  H  11/30/20  04:35    


 


Carbon Dioxide  20 mmol/L (22-30)  L  11/30/20  04:35    


 


Anion Gap  8  (5-19)   11/30/20  04:35    


 


BUN  40 mg/dL (7-20)  H  11/30/20  04:35    


 


Creatinine  1.05 mg/dL (0.52-1.25)   11/30/20  04:35    


 


Est GFR ( Amer)  > 60  (>60)   11/30/20  04:35    


 


Est GFR (Non-Af Amer)  Cancelled   11/29/20  03:15    


 


Est GFR (MDRD) Non-Af  > 60  (>60)   11/30/20  04:35    


 


Glucose  118 mg/dL ()  H  11/30/20  04:35    


 


POC Glucose  138 mg/dL ()  H  12/01/20  05:50    


 


Lactic Acid  1.3 mmol/L (0.7-2.1)   11/23/20  01:20    


 


Calcium  8.1 mg/dL (8.4-10.2)  L  11/30/20  04:35    


 


Phosphorus  1.8 mg/dL (2.5-4.5)  L  11/30/20  04:35    


 


Magnesium  2.3 mg/dL (1.6-2.3)   11/30/20  04:35    


 


Total Bilirubin  1.0 mg/dL (0.2-1.3)   11/30/20  04:35    


 


Direct Bilirubin  0.7 mg/dL (0.0-0.4)  H  11/30/20  04:35    


 


Neonat Total Bilirubin  Not Reportable   11/30/20  04:35    


 


Neonat Direct Bilirubin  Not Reportable   11/30/20  04:35    


 


Neonat Indirect Bili  Not Reportable   11/30/20  04:35    


 


AST  17 U/L (17-59)   11/30/20  04:35    


 


ALT  9 U/L (<50)   11/30/20  04:35    


 


Alkaline Phosphatase  152 U/L ()  H  11/30/20  04:35    


 


Ammonia  < 8.7 umol/L (9-33)  L  11/22/20  13:20    


 


Troponin I  < 0.012 ng/mL  11/15/20  22:52    


 


Total Protein  3.9 g/dL (6.3-8.2)  L  11/30/20  04:35    


 


Albumin  1.6 g/dL (3.5-5.0)  L  11/30/20  04:35    


 


Prealbumin  < 3.0 mg/dL (17.6-36.0)  L  11/30/20  04:35    


 


Triglycerides  225 mg/dL (<150)  H  11/30/20  17:30    


 


Lipase  201.4 U/L ()   11/15/20  22:52    


 


EGFR   Cancelled   11/29/20  03:15    


 


Urine Color  KAT   11/25/20  01:05    


 


Urine Appearance  TURBID   11/25/20  01:05    


 


Urine pH  6.0  (5.0-9.0)   11/25/20  01:05    


 


Ur Specific Gravity  1.023   11/25/20  01:05    


 


Urine Protein  100 mg/dL (NEGATIVE)  H  11/25/20  01:05    


 


Urine Glucose (UA)  NEGATIVE mg/dL (NEGATIVE)   11/25/20  01:05    


 


Urine Ketones  NEGATIVE mg/dL (NEGATIVE)   11/25/20  01:05    


 


Urine Blood  MODERATE  (NEGATIVE)  H  11/25/20  01:05    


 


Urine Nitrite  NEGATIVE  (NEGATIVE)   11/16/20  02:35    


 


Urine Nitrite (Reflex)  NEGATIVE  (NEGATIVE)   11/25/20  01:05    


 


Urine Bilirubin  NEGATIVE  (NEGATIVE)   11/25/20  01:05    


 


Urine Urobilinogen  NEGATIVE mg/dL (<2.0)   11/25/20  01:05    


 


Ur Leukocyte Esterase  NEGATIVE  (NEGATIVE)   11/16/20  02:35    


 


Leukocyte Esterase Rfl  NEGATIVE  (NEGATIVE)   11/25/20  01:05    


 


Urine WBC (Auto)  4 /HPF  11/16/20  02:35    


 


Urine RBC (Auto)  6 /HPF  11/25/20  01:05    


 


Urine WBC (Reflex)  1 /HPF  11/25/20  01:05    


 


Squamous Epi Cells Auto  2 /HPF  11/25/20  01:05    


 


Urine Ascorbic Acid  NEGATIVE  (NEGATIVE)   11/25/20  01:05    


 


Time Trough Drawn  0547   11/25/20  05:47    


 


Vancomycin Trough  18.8 ug/mL (5.0-20.0)   11/25/20  05:47    


 


Slides for Path Review  PATHOLOGIST REVIEWED   11/27/20  17:43    


 


Blood Type  A POSITIVE   11/24/20  07:42    


 


Blood Type Confirm  A POSITIVE   11/21/20  06:00    


 


Antibody Screen  NEGATIVE   11/24/20  07:42    


 


Crossmatch  See Detail   11/24/20  07:42    








                                        











  11/15/20





  22:52


 


Troponin I  < 0.012











Impressions: 


                                        





Abdomen/Pelvis CT  11/15/20 22:35


IMPRESSION:


 


1.  Stable pancreatic head mass and multiple hepatic metastases. 


Biliary drain and pneumobilia are also stable from the prior CT.


 


2.  Mild free fluid within the abdomen and pelvis, increased from


the prior study.


 


3.  Tiny bilateral pleural effusions and mild basilar atelectasis


or infiltration.


 


 


 


TECHNICAL DOCUMENTATION:


 


Quality ID # 436: Final reports with documentation of one or more


dose reduction techniques (e.g., Automated exposure control,


adjustment of the mA and/or kV according to patient size, use of


iterative reconstruction technique)


 


copyright 2011 [employer]- All Rights Reserved


 








Venous Doppler Study  11/16/20 08:07


IMPRESSION:  1.  POSITIVE EXAM. EVIDENCE OF DVT AND SVT LEFT ARM AS ABOVE.


 








Chest X-Ray  11/20/20 00:00


IMPRESSION:  Right basilar airspace disease.  Small pleural effusions.  Findings

 are accentuated by low lung volumes.


 








Chest X-Ray  11/26/20 12:13


IMPRESSION:  Small bilateral pleural effusions in the posterior costophrenic 

sulci


Bibasilar airspace disease atelectasis versus pneumonia right greater than left


 














Plan


Plan of Treatment: 


Home for hospice


Time Spent: Greater than 30 Minutes





Stroke


Is this a Stroke Patient?: No





Acute Heart Failure


Is this a Heart Failure Patient?: No

## 2024-03-26 NOTE — PDOC PROGRESS REPORT
Subjective


Date:: 11/16/20


Subjective:: 


The patient looks extremely uncomfortable this morning.  In fact he looks quite 

miserable.  He states he feels slightly better but still feels quite poorly 

overall.  Complaining of left upper extremity pain with marked swelling.  He 

also notes swelling in the right lower extremity.


Reason For Visit: 


HYPOTENSION








Physical Exam


Vital Signs: 


                                        











Temp Pulse Resp BP Pulse Ox


 


 97.6 F   132 H  21 H  89/56 L  95 


 


 11/16/20 13:34  11/15/20 22:36  11/16/20 14:30  11/16/20 14:30  11/16/20 14:30








                                 Intake & Output











 11/15/20 11/16/20 11/17/20





 06:59 06:59 06:59


 


Intake Total  2000 


 


Balance  2000 


 


Weight  58.06 kg 











General appearance: PRESENT: cooperative, severe distress - Moderate to severe 

distress, well-developed


Head exam: PRESENT: atraumatic, normocephalic


Mouth exam: PRESENT: dry mucosa, tongue midline


Respiratory exam: PRESENT: clear to auscultation destiny - Anteriorly, symmetrical, 

unlabored.  ABSENT: rales, rhonchi, tachypnea, wheezes


Cardiovascular exam: PRESENT: RRR, +S1, +S2.  ABSENT: bradycardia, diastolic 

murmur, irregular rhythm, systolic murmur, tachycardia


GI/Abdominal exam: PRESENT: normal bowel sounds, soft, tenderness


Rectal exam: PRESENT: deferred


Gentrourinary exam: ABSENT: indwelling catheter


Extremities exam: PRESENT: +1 edema - Right leg with calf tenderness, +2 edema -

Left arm with marked tenderness


Neurological exam: PRESENT: alert, awake, oriented to person, oriented to place,

oriented to time, oriented to situation, CN II-XII grossly intact


Psychiatric exam: PRESENT: appropriate affect - Affect reflects his significant 

discomfort.  ABSENT: agitated, anxious





Results


Laboratory Results: 


                                        





                                 11/15/20 22:52 





                                 11/15/20 22:52 





                                        











  11/15/20 11/15/20 11/16/20





  22:52 22:52 02:31


 


WBC  30.3 H*  


 


RBC  3.53 L  


 


Hgb  9.6 L  


 


Hct  29.7 L  


 


MCV  84  


 


MCH  27.3  


 


MCHC  32.4  


 


RDW  16.9 H  


 


Plt Count  426  


 


Seg Neutrophils %  Not Reportable  


 


VBG pH   


 


VBG pCO2   


 


VBG HCO3   


 


VBG Base Excess   


 


Sodium   134.3 L 


 


Potassium   3.6 


 


Chloride   101 


 


Carbon Dioxide   21 L 


 


Anion Gap   12 


 


BUN   23 H 


 


Creatinine   1.26 H 


 


Est GFR ( Amer)   > 60 


 


Glucose   126 H 


 


Lactic Acid    0.8


 


Calcium   8.9 


 


Magnesium   2.2 


 


Total Bilirubin   1.6 H 


 


AST   31 


 


Alkaline Phosphatase   473 H 


 


Total Protein   6.6 


 


Albumin   2.8 L 


 


Lipase   201.4 


 


Urine Color   


 


Urine Appearance   


 


Urine pH   


 


Ur Specific Gravity   


 


Urine Protein   


 


Urine Glucose (UA)   


 


Urine Ketones   


 


Urine Blood   


 


Urine Nitrite   


 


Ur Leukocyte Esterase   


 


Urine WBC (Auto)   


 


Urine RBC (Auto)   














  11/16/20 11/16/20





  02:35 03:25


 


WBC  


 


RBC  


 


Hgb  


 


Hct  


 


MCV  


 


MCH  


 


MCHC  


 


RDW  


 


Plt Count  


 


Seg Neutrophils %  


 


VBG pH   7.39


 


VBG pCO2   37.8


 


VBG HCO3   22.4


 


VBG Base Excess   -2.2


 


Sodium  


 


Potassium  


 


Chloride  


 


Carbon Dioxide  


 


Anion Gap  


 


BUN  


 


Creatinine  


 


Est GFR (African Amer)  


 


Glucose  


 


Lactic Acid  


 


Calcium  


 


Magnesium  


 


Total Bilirubin  


 


AST  


 


Alkaline Phosphatase  


 


Total Protein  


 


Albumin  


 


Lipase  


 


Urine Color  YELLOW 


 


Urine Appearance  CLEAR 


 


Urine pH  6.0 


 


Ur Specific Gravity  1.039 


 


Urine Protein  NEGATIVE 


 


Urine Glucose (UA)  NEGATIVE 


 


Urine Ketones  NEGATIVE 


 


Urine Blood  NEGATIVE 


 


Urine Nitrite  NEGATIVE 


 


Ur Leukocyte Esterase  NEGATIVE 


 


Urine WBC (Auto)  4 


 


Urine RBC (Auto)  1 








                                        











  11/15/20





  22:52


 


Troponin I  < 0.012











Impressions: 


                                        





Abdomen/Pelvis CT  11/15/20 22:35


IMPRESSION:


 


1.  Stable pancreatic head mass and multiple hepatic metastases. 


Biliary drain and pneumobilia are also stable from the prior CT.


 


2.  Mild free fluid within the abdomen and pelvis, increased from


the prior study.


 


3.  Tiny bilateral pleural effusions and mild basilar atelectasis


or infiltration.


 


 


 


TECHNICAL DOCUMENTATION:


 


Quality ID # [id number]: Final reports with documentation of one or more


dose reduction techniques (e.g., Automated exposure control,


adjustment of the mA and/or kV according to patient size, use of


iterative reconstruction technique)


 


copyright 2011 [employer]- All Rights Reserved


 








Venous Doppler Study  11/16/20 08:07


IMPRESSION:  1.  POSITIVE EXAM. EVIDENCE OF DVT AND SVT LEFT ARM AS ABOVE.


 














Assessment and Plan





- Diagnosis


(1) Hypotension


Qualifiers: 


   Hypotension type: hypotension due to hypovolemia   Qualified Code(s): I95.89 

- Other hypotension; E86.1 - Hypovolemia   


Is this a current diagnosis for this admission?: Yes   


Plan: 


Hypotension is likely due to hypovolemia as patient has barely had any p.o. 

intake in the past few days.


Received 2 L normal saline bolus


We will continue continuous infusion of normal saline


Monitor vital signs closely.








(2) GIUSEPPE (acute kidney injury)


Is this a current diagnosis for this admission?: Yes   


Plan: 


Likely prerenal due to hypovolemia.  Monitor creatinine response to IV fluid 

administration.








(3) Leukocytosis


Qualifiers: 


   Leukocytosis type: unspecified   Qualified Code(s): D72.829 - Elevated white 

blood cell count, unspecified   


Is this a current diagnosis for this admission?: Yes   


Plan: 


Leukocytosis of 30,000.  Reviewed chest x-ray which does not seem to be 

impressive for any pneumonia.  Urinalysis is negative.  Abdominal CT is simply 

shows findings of pancreatic cancer with liver mets.


Blood cultures obtained.


Received vancomycin and cefepime in the ER


No clear source of infection has been identified but given patient's 

presentation and immunocompromise state, I will continue patient on empiric 

antibiotics





Patient's oncologist has been consulted and will appreciate any recommendations 

as to whether there is suspicion that patient leukocytosis is of noninfectious 

etiology from malignancy.








(4) Malnutrition


Qualifiers: 


   Malnutrition type: protein-calorie malnutrition   Protein-calorie 

malnutrition severity: moderate   Qualified Code(s): E44.0 - Moderate protein-

calorie malnutrition   


Is this a current diagnosis for this admission?: Yes   


Plan: 


Dietitian consulted








(5) Nausea & vomiting


Qualifiers: 


   Vomiting type: unspecified   Vomiting Intractability: intractable   Qualified

Code(s): R11.2 - Nausea with vomiting, unspecified   


Is this a current diagnosis for this admission?: Yes   


Plan: 


Phenergan as needed








(6) Pancreatic cancer metastasized to liver


Is this a current diagnosis for this admission?: Yes   


Plan: 


Port cath just placed about a weeks ago.  Patient planned for chemotherapy.  

Oncologist has been consulted.  Continue oxycodone for patient's neoplasm 

related abdominal pain.  Bowel regimen.








- Plan Summary


Summary: 


11/16/2020


The patient's blood pressure still low.  He is still quite uncomfortable


I will change his fluids to lactated Ringer's.  He may need boluses.  We will 

monitor his vital signs.


Acute kidney injury-renal function should improve with IV fluids and resolution 

of underlying infection.


Appetite is still quite poor.  Consider dietitian consult.


Antiemetics for nausea and vomiting


Recheck white blood cell count.  With antibiotics that should improve.





- Time


Time Spent with patient: 15-24 minutes


Medications reviewed and adjusted accordingly: Yes


Anticipated Discharge Disposition: Home with Home Health


Anticipated Discharge Timeframe: within 72 hours
Subjective


Date:: 11/17/20


Subjective:: 


Patient feeling slightly better.  Somewhat tremulous.  Left arm is still swollen

and painful.  Still complains of constipation.


Reason For Visit: 


HYPOTENSION








Physical Exam


Vital Signs: 


                                        











Temp Pulse Resp BP Pulse Ox


 


 98.5 F   97   18   106/62   95 


 


 11/17/20 07:37  11/17/20 07:37  11/17/20 07:37  11/17/20 07:37  11/17/20 07:37








                                 Intake & Output











 11/16/20 11/17/20 11/18/20





 06:59 06:59 06:59


 


Intake Total 2000 1889 


 


Output Total  300 


 


Balance 2000 1589 


 


Weight 58.06 kg 62.1 kg 











General appearance: PRESENT: cooperative, mild distress, thin


Head exam: PRESENT: atraumatic, normocephalic


Ear exam: PRESENT: normal external ear exam.  ABSENT: bleeding, drainage


Mouth exam: PRESENT: dry mucosa, tongue midline


Respiratory exam: PRESENT: clear to auscultation destiny, symmetrical, unlabored.  

ABSENT: rales, rhonchi, tachypnea, wheezes


Cardiovascular exam: PRESENT: RRR, +S1, +S2


GI/Abdominal exam: PRESENT: normal bowel sounds, soft.  ABSENT: tenderness


Rectal exam: PRESENT: deferred


Extremities exam: PRESENT: other - 2+ edema left arm.  Right pedal edema.


Neurological exam: PRESENT: alert, awake, oriented to person, oriented to place,

oriented to time, oriented to situation, CN II-XII grossly intact.  ABSENT: alt

ered


Psychiatric exam: PRESENT: flat affect.  ABSENT: agitated, anxious





Results


Laboratory Results: 


                                        





                                 11/17/20 09:41 





                                 11/17/20 09:41 





                                        











  11/17/20 11/17/20





  09:41 09:41


 


WBC  35.2 H* 


 


RBC  3.11 L 


 


Hgb  8.6 L 


 


Hct  25.8 L 


 


MCV  83 


 


MCH  27.5 


 


MCHC  33.1 


 


RDW  17.1 H 


 


Plt Count  364 


 


Seg Neutrophils %  Not Reportable 


 


Sodium   133.1 L


 


Potassium   3.6


 


Chloride   105


 


Carbon Dioxide   19 L


 


Anion Gap   9


 


BUN   10


 


Creatinine   0.58


 


Est GFR (African Amer)   > 60


 


Glucose   108


 


Calcium   8.1 L


 


Phosphorus   2.5


 


Magnesium   1.9


 


Total Bilirubin   1.6 H


 


AST   30


 


Alkaline Phosphatase   298 H


 


Total Protein   5.4 L


 


Albumin   2.2 L








                                        











  11/15/20





  22:52


 


Troponin I  < 0.012











Impressions: 


                                        





Abdomen/Pelvis CT  11/15/20 22:35


IMPRESSION:


 


1.  Stable pancreatic head mass and multiple hepatic metastases. 


Biliary drain and pneumobilia are also stable from the prior CT.


 


2.  Mild free fluid within the abdomen and pelvis, increased from


the prior study.


 


3.  Tiny bilateral pleural effusions and mild basilar atelectasis


or infiltration.


 


 


 


TECHNICAL DOCUMENTATION:


 


Quality ID # 436: Final reports with documentation of one or more


dose reduction techniques (e.g., Automated exposure control,


adjustment of the mA and/or kV according to patient size, use of


iterative reconstruction technique)


 


copyright 2011 [employer]- All Rights Reserved


 








Venous Doppler Study  11/16/20 08:07


IMPRESSION:  1.  POSITIVE EXAM. EVIDENCE OF DVT AND SVT LEFT ARM AS ABOVE.


 














Assessment and Plan





- Diagnosis


(1) Hypotension


Qualifiers: 


   Hypotension type: hypotension due to hypovolemia   Qualified Code(s): I95.89 

- Other hypotension; E86.1 - Hypovolemia   


Is this a current diagnosis for this admission?: Yes   





(2) GIUSEPPE (acute kidney injury)


Is this a current diagnosis for this admission?: Yes   





(3) Leukocytosis


Qualifiers: 


   Leukocytosis type: unspecified   Qualified Code(s): D72.829 - Elevated white 

blood cell count, unspecified   


Is this a current diagnosis for this admission?: Yes   





(4) Malnutrition


Qualifiers: 


   Malnutrition type: protein-calorie malnutrition   Protein-calorie 

malnutrition severity: moderate   Qualified Code(s): E44.0 - Moderate protein-

calorie malnutrition   


Is this a current diagnosis for this admission?: Yes   





(5) Nausea & vomiting


Qualifiers: 


   Vomiting type: unspecified   Vomiting Intractability: intractable   Qualified

Code(s): R11.2 - Nausea with vomiting, unspecified   


Is this a current diagnosis for this admission?: Yes   





(6) Pancreatic cancer metastasized to liver


Is this a current diagnosis for this admission?: Yes   





(7) DVT of left axillary vein, acute


Is this a current diagnosis for this admission?: Yes   





(8) Acute thrombosis of left subclavian vein


Is this a current diagnosis for this admission?: Yes   





- Plan Summary


Summary: 


11/16/2020


The patient's blood pressure still low.  He is still quite uncomfortable


I will change his fluids to lactated Ringer's.  He may need boluses.  We will 

monitor his vital signs.


Acute kidney injury-renal function should improve with IV fluids and resolution 

of underlying infection.


Appetite is still quite poor.  Consider dietitian consult.


Antiemetics for nausea and vomiting


Recheck white blood cell count.  With antibiotics that should improve.


Ultrasound showed acute thrombus in the left axillary vein, distal left 

subclavian vein and basilic vein.  The patient is on therapeutic Lovenox.





11/17/2020


Constipation-mineral oil enema and start lubiprostone daily


Blood pressure-improved.  Continue LR at 200 an hour.  Consider decreasing the 

rate later today.


Antiemetics for nausea and vomiting


[place] trial of Ensure


Renal function normal


DVT-continue therapeutic Lovenox.  Consider changing to direct oral 

anticoagulant.  K pad to left arm and right leg.  Elevate left arm if possible.


Hyperbilirubinemia-chronic.  Patient reports much improved from prior to 

surgery.


Did discuss the patient's insurance and lack of medication coverage.  Spoke to 

discharge planning in the Medicaid representative will be coming to see the 

patient and his wife.





- Time


Time Spent with patient: 15-24 minutes


Medications reviewed and adjusted accordingly: Yes


Anticipated Discharge Disposition: Home with Home Health


Anticipated Discharge Timeframe: Unknown
Subjective


Date:: 11/17/20


Subjective:: 


Pt still w/ pain this am. Looks better hydrated today. 





Reason For Visit: 


HYPOTENSION








Physical Exam


Vital Signs: 


                                        











Temp Pulse Resp BP Pulse Ox


 


 97.7 F   100   21 H  92/58 L  96 


 


 11/17/20 04:10  11/17/20 04:10  11/17/20 04:10  11/17/20 04:10  11/17/20 04:10








                                 Intake & Output











 11/16/20 11/17/20 11/18/20





 06:59 06:59 06:59


 


Intake Total 2000 1889 


 


Output Total  300 


 


Balance 2000 1589 


 


Weight 58.06 kg 62.1 kg 











General appearance: PRESENT: no acute distress, well-developed, well-nourished


Head exam: PRESENT: atraumatic, normocephalic


Eye exam: PRESENT: conjunctiva pink, EOMI, PERRLA.  ABSENT: scleral icterus


Ear exam: PRESENT: normal external ear exam


Mouth exam: PRESENT: moist, tongue midline


Neck exam: ABSENT: carotid bruit, JVD, lymphadenopathy, thyromegaly


Respiratory exam: PRESENT: clear to auscultation destiny.  ABSENT: rales, rhonchi, 

wheezes


Cardiovascular exam: PRESENT: RRR.  ABSENT: diastolic murmur, rubs, systolic 

murmur


Pulses: PRESENT: normal dorsalis pedis pul


Vascular exam: PRESENT: normal capillary refill


GI/Abdominal exam: PRESENT: normal bowel sounds, soft.  ABSENT: distended, 

guarding, mass, organolmegaly, rebound, tenderness


Rectal exam: PRESENT: deferred


Extremities exam: PRESENT: full ROM.  ABSENT: calf tenderness, clubbing, pedal 

edema


Neurological exam: PRESENT: alert, awake, oriented to person, oriented to place,

oriented to time, oriented to situation, CN II-XII grossly intact.  ABSENT: 

motor sensory deficit


Psychiatric exam: PRESENT: appropriate affect, normal mood.  ABSENT: homicidal 

ideation, suicidal ideation


Skin exam: PRESENT: dry, intact, warm.  ABSENT: cyanosis, rash





Results


Laboratory Results: 


                                        





                                 11/15/20 22:52 





                                 11/15/20 22:52 





                                        











  11/15/20





  22:52


 


Troponin I  < 0.012











Impressions: 


                                        





Abdomen/Pelvis CT  11/15/20 22:35


IMPRESSION:


 


1.  Stable pancreatic head mass and multiple hepatic metastases. 


Biliary drain and pneumobilia are also stable from the prior CT.


 


2.  Mild free fluid within the abdomen and pelvis, increased from


the prior study.


 


3.  Tiny bilateral pleural effusions and mild basilar atelectasis


or infiltration.


 


 


 


TECHNICAL DOCUMENTATION:


 


Quality ID # 436: Final reports with documentation of one or more


dose reduction techniques (e.g., Automated exposure control,


adjustment of the mA and/or kV according to patient size, use of


iterative reconstruction technique)


 


copyright 2011 [employer]- All Rights Reserved


 








Venous Doppler Study  11/16/20 08:07


IMPRESSION:  1.  POSITIVE EXAM. EVIDENCE OF DVT AND SVT LEFT ARM AS ABOVE.


 














Assessment & Plan





- Diagnosis


(1) Nausea & vomiting


Qualifiers: 


   Vomiting type: unspecified   Vomiting Intractability: intractable   Qualified

Code(s): R11.2 - Nausea with vomiting, unspecified   


Is this a current diagnosis for this admission?: Yes   


Plan: 


Con't treatment with IVF and antiemetics








(2) Pancreatic cancer metastasized to liver


Is this a current diagnosis for this admission?: Yes   


Plan: 


Would like to initiate chemo while admitted. Awaiting pharmacy approval.








- Time


Time Spent with patient: 35 or more minutes
Subjective


Date:: 11/18/20


Subjective:: 





Patient complains of some abdominal pain today.  Also complains of constipation.

 Still having significant left arm swelling and pain.  Occasionally gets some 

tingling in his fingers but does not persist for long.


Reason For Visit: 


FLOFIRIFOX








Physical Exam


Vital Signs: 


                                        











Temp Pulse Resp BP Pulse Ox


 


 97.8 F   106 H  32 H  131/75 H  96 


 


 11/18/20 10:00  11/18/20 08:19  11/18/20 08:19  11/18/20 08:19  11/18/20 08:19








                                 Intake & Output











 11/17/20 11/18/20 11/19/20





 06:59 06:59 06:59


 


Intake Total 1889 2920 376


 


Output Total 300  


 


Balance 1589 2920 376


 


Weight 62.1 kg 65.1 kg 











General appearance: PRESENT: no acute distress, cooperative


Neck exam: ABSENT: JVD


Respiratory exam: PRESENT: symmetrical, unlabored.  ABSENT: accessory muscle 

use, retraction, tachypnea, wheezes


Cardiovascular exam: PRESENT: RRR, +S1, +S2.  ABSENT: tachycardia


GI/Abdominal exam: PRESENT: soft, tenderness.  ABSENT: rebound, rigid


Extremities exam: PRESENT: other - Swelling of the left arm.  Has good capillary

refill.


Neurological exam: PRESENT: alert, awake, oriented to person, oriented to place,

oriented to time, oriented to situation


Psychiatric exam: ABSENT: agitated, anxious





Results


Laboratory Results: 


                                        





                                 11/17/20 09:41 





                                 11/17/20 09:41 





                                        





11/16/20 04:00   Blood   Blood Culture (PCR) - Final


                            Staphylococcus Species


11/16/20 05:30   Blood   Blood Culture (PCR) - Final


                            Staphylococcus Species


11/16/20 02:35   Clean Catch Midstream   Urine Culture - Final


                            NO GROWTH 2 DAYS





                                        











  11/15/20





  22:52


 


Troponin I  < 0.012











Impressions: 


                                        





Abdomen/Pelvis CT  11/15/20 22:35


IMPRESSION:


 


1.  Stable pancreatic head mass and multiple hepatic metastases. 


Biliary drain and pneumobilia are also stable from the prior CT.


 


2.  Mild free fluid within the abdomen and pelvis, increased from


the prior study.


 


3.  Tiny bilateral pleural effusions and mild basilar atelectasis


or infiltration.


 


 


 


TECHNICAL DOCUMENTATION:


 


Quality ID # 436: Final reports with documentation of one or more


dose reduction techniques (e.g., Automated exposure control,


adjustment of the mA and/or kV according to patient size, use of


iterative reconstruction technique)


 


copyright 2011 [employer]- All Rights Reserved


 








Venous Doppler Study  11/16/20 08:07


IMPRESSION:  1.  POSITIVE EXAM. EVIDENCE OF DVT AND SVT LEFT ARM AS ABOVE.


 














Assessment and Plan





- Diagnosis


(1) Pancreatic cancer metastasized to liver


Is this a current diagnosis for this admission?: Yes   


Plan: 


Oncology is following.  Patient started on FOLFIRINOX today.  He will be 

receiving a shot of 5-FU today which will then be continued as a slow infusion 

via pump until Friday. 


Continue oxycodone for patient's neoplasm related abdominal pain.  On Colace and

lubiprostone.  Still constipated so I will give a some MiraLAX today.








(2) DVT of left axillary vein, acute


Is this a current diagnosis for this admission?: Yes   


Plan: 


Continue therapeutic Lovenox.  Arm elevation.  Monitor closely for any evidence 

of compartment syndrome.








(3) Hypotension


Qualifiers: 


   Hypotension type: hypotension due to hypovolemia   Qualified Code(s): I95.89 

- Other hypotension; E86.1 - Hypovolemia   


Is this a current diagnosis for this admission?: Yes   


Plan: 


BP has improved.  Monitor vitals closely.








(4) GIUSEPPE (acute kidney injury)


Is this a current diagnosis for this admission?: Yes   


Plan: 


Resolved.  I will slow down lactated Ringer's to 70 cc/h.  Check metabolic panel

in the morning.








(5) Leukocytosis


Qualifiers: 


   Leukocytosis type: unspecified   Qualified Code(s): D72.829 - Elevated white 

blood cell count, unspecified   


Is this a current diagnosis for this admission?: Yes   


Plan: 


Etiology is questionable at this time.  Could be from malignancy or DVT or 

infection. Monitor CBC. Discontinue Cefepime.





Patient's oncologist has been consulted and will appreciate any recommendations 

as to whether there is suspicion that patient leukocytosis is of noninfectious 

etiology from malignancy.








(6) Coagulase negative Staphylococcus bacteremia


Is this a current diagnosis for this admission?: Yes   


Plan: 


Patient has 1/2 positive from both blood culture sets for Meth Resistant Coag 

Neg Staph.


While it is unclear whether this is a true bacteremia or contaminant, I will 

continue vancomycin to complete 7 days duration of treatment.


Repeat blood culture today.








(7) Malnutrition


Qualifiers: 


   Malnutrition type: protein-calorie malnutrition   Protein-calorie 

malnutrition severity: moderate   Qualified Code(s): E44.0 - Moderate protein-

calorie malnutrition   


Is this a current diagnosis for this admission?: Yes   


Plan: 


Dietitian consulted








(8) Nausea & vomiting


Qualifiers: 


   Vomiting type: unspecified   Vomiting Intractability: intractable   Qualified

Code(s): R11.2 - Nausea with vomiting, unspecified   


Is this a current diagnosis for this admission?: Yes   


Plan: 


Phenergan as needed








- Time


Time Spent with patient: 15-24 minutes


Anticipated Discharge Disposition: Home, Self Care


Anticipated Discharge Timeframe: >72hrs
Subjective


Date:: 11/18/20


Subjective:: 


Doing ok this am. Up in chair this am. Planned for chemo today. BCx from 11/16 

grew coag astrid traylor. Sent repeat today. 





Reason For Visit: 


FLOFIRIFOX








Physical Exam


Vital Signs: 


                                        











Temp Pulse Resp BP Pulse Ox


 


 99.9 F   97   16   114/56 L  92 


 


 11/18/20 04:22  11/18/20 04:22  11/18/20 04:22  11/18/20 04:22  11/18/20 04:22








                                 Intake & Output











 11/17/20 11/18/20 11/19/20





 06:59 06:59 06:59


 


Intake Total 1889 2920 


 


Output Total 300  


 


Balance 1589 2920 


 


Weight 62.1 kg 65.1 kg 











General appearance: PRESENT: no acute distress, well-developed, well-nourished


Head exam: PRESENT: atraumatic, normocephalic


Eye exam: PRESENT: conjunctiva pink, EOMI, PERRLA.  ABSENT: scleral icterus


Ear exam: PRESENT: normal external ear exam


Mouth exam: PRESENT: moist, tongue midline


Neck exam: ABSENT: carotid bruit, JVD, lymphadenopathy, thyromegaly


Respiratory exam: PRESENT: clear to auscultation destiny.  ABSENT: rales, rhonchi, 

wheezes


Cardiovascular exam: PRESENT: RRR.  ABSENT: diastolic murmur, rubs, systolic 

murmur


Pulses: PRESENT: normal dorsalis pedis pul


Vascular exam: PRESENT: normal capillary refill


GI/Abdominal exam: PRESENT: normal bowel sounds, soft.  ABSENT: distended, 

guarding, mass, organolmegaly, rebound, tenderness


Rectal exam: PRESENT: deferred


Extremities exam: PRESENT: full ROM.  ABSENT: calf tenderness, clubbing, pedal 

edema


Neurological exam: PRESENT: alert, awake, oriented to person, oriented to place,

oriented to time, oriented to situation, CN II-XII grossly intact.  ABSENT: 

motor sensory deficit


Psychiatric exam: PRESENT: appropriate affect, normal mood.  ABSENT: homicidal 

ideation, suicidal ideation


Skin exam: PRESENT: dry, intact, warm.  ABSENT: cyanosis, rash





Results


Laboratory Results: 


                                        





                                 11/17/20 09:41 





                                 11/17/20 09:41 





                                        











  11/17/20 11/17/20





  09:41 09:41


 


WBC  35.2 H* 


 


RBC  3.11 L 


 


Hgb  8.6 L 


 


Hct  25.8 L 


 


MCV  83 


 


MCH  27.5 


 


MCHC  33.1 


 


RDW  17.1 H 


 


Plt Count  364 


 


Seg Neutrophils %  Not Reportable 


 


Sodium   133.1 L


 


Potassium   3.6


 


Chloride   105


 


Carbon Dioxide   19 L


 


Anion Gap   9


 


BUN   10


 


Creatinine   0.58


 


Est GFR (African Amer)   > 60


 


Glucose   108


 


Calcium   8.1 L


 


Phosphorus   2.5


 


Magnesium   1.9


 


Total Bilirubin   1.6 H


 


AST   30


 


Alkaline Phosphatase   298 H


 


Total Protein   5.4 L


 


Albumin   2.2 L








                                        





11/16/20 02:35   Clean Catch Midstream   Urine Culture - Final


                            NO GROWTH 2 DAYS





                                        











  11/15/20





  22:52


 


Troponin I  < 0.012











Impressions: 


                                        





Abdomen/Pelvis CT  11/15/20 22:35


IMPRESSION:


 


1.  Stable pancreatic head mass and multiple hepatic metastases. 


Biliary drain and pneumobilia are also stable from the prior CT.


 


2.  Mild free fluid within the abdomen and pelvis, increased from


the prior study.


 


3.  Tiny bilateral pleural effusions and mild basilar atelectasis


or infiltration.


 


 


 


TECHNICAL DOCUMENTATION:


 


Quality ID # [id number]: Final reports with documentation of one or more


dose reduction techniques (e.g., Automated exposure control,


adjustment of the mA and/or kV according to patient size, use of


iterative reconstruction technique)


 


copyright 2011 [employer]- All Rights Reserved


 








Venous Doppler Study  11/16/20 08:07


IMPRESSION:  1.  POSITIVE EXAM. EVIDENCE OF DVT AND SVT LEFT ARM AS ABOVE.


 














Assessment & Plan





- Diagnosis


(1) Nausea & vomiting


Qualifiers: 


   Vomiting type: unspecified   Vomiting Intractability: intractable   Qualified

Code(s): R11.2 - Nausea with vomiting, unspecified   


Is this a current diagnosis for this admission?: Yes   


Plan: 


Cont current care, still w/ nausea








(2) Pancreatic cancer metastasized to liver


Is this a current diagnosis for this admission?: Yes   


Plan: 


Planned to receive cycle #1 of FOLFIRINOX today. Pump placed today then will be 

removed thursday. Pt will need to remain admitted thru weekend to follow side 

effects as nausea/vomiting/dehydration will worsen. Will follow.








- Time


Time Spent with patient: 35 or more minutes
Subjective


Date:: 11/19/20


Subjective:: 





Patient appeared tired this morning.  He stated that he was just waking up.  Oth

erwise he said not much change in the way he feels.  He is currently receiving 

5-FU infusion.  He had a bowel movement yesterday.  He denies any shortness of 

breath or chest pain.  Still has some abdominal pain typical from his cancer.  I

encouraged him to ensure adequate oral intake.  He does not feel nauseous at the

time of my encounter but yet to touch his breakfast tray as he was sleeping.  He

states the pain in his left arm feels better.


Reason For Visit: 


FLOFIRIFOX








Physical Exam


Vital Signs: 


                                        











Temp Pulse Resp BP Pulse Ox


 


 97.5 F   79   20   105/58 L  95 


 


 11/19/20 08:52  11/19/20 08:00  11/19/20 08:00  11/19/20 08:00  11/19/20 08:00








                                 Intake & Output











 11/18/20 11/19/20 11/20/20





 06:59 06:59 06:59


 


Intake Total 2920 1155.4 250


 


Output Total  0 


 


Balance 2920 1155.4 250


 


Weight 65.1 kg 67.2 kg 











General appearance: PRESENT: no acute distress, cooperative, other - Fatigue


Neck exam: ABSENT: JVD


Respiratory exam: PRESENT: symmetrical, unlabored.  ABSENT: tachypnea, wheezes


Cardiovascular exam: PRESENT: RRR, +S1, +S2.  ABSENT: tachycardia


GI/Abdominal exam: PRESENT: distended - Mild, soft, tenderness.  ABSENT: 

rebound, rigid


Extremities exam: PRESENT: pedal edema - Lower extremity swelling right greater 

than left, other - Left arm swelling and tenderness


Neurological exam: PRESENT: alert, awake, oriented to person, oriented to place,

oriented to time, oriented to situation





Results


Laboratory Results: 


                                        





                                 11/19/20 06:59 





                                 11/19/20 06:59 





                                        











  11/18/20 11/18/20 11/19/20





  15:22 15:22 06:59


 


WBC   35.7 H*  38.2 H*


 


RBC   3.13 L  3.05 L


 


Hgb   8.4 L  8.3 L


 


Hct   26.1 L  25.6 L


 


MCV   84  84


 


MCH   26.8 L  27.3


 


MCHC   32.1  32.4


 


RDW   16.9 H  16.8 H


 


Plt Count   359  331


 


Seg Neutrophils %    Not Reportable


 


Sodium   


 


Potassium   


 


Chloride   


 


Carbon Dioxide   


 


Anion Gap   


 


BUN   


 


Creatinine  0.52  


 


Est GFR ( Amer)  > 60  


 


Glucose   


 


Calcium   


 


Phosphorus   


 


Magnesium   


 


Total Bilirubin   


 


AST   


 


Alkaline Phosphatase   


 


Total Protein   


 


Albumin   


 


Prealbumin   














  11/19/20





  06:59


 


WBC 


 


RBC 


 


Hgb 


 


Hct 


 


MCV 


 


MCH 


 


MCHC 


 


RDW 


 


Plt Count 


 


Seg Neutrophils % 


 


Sodium  134.4 L


 


Potassium  4.2


 


Chloride  105


 


Carbon Dioxide  22


 


Anion Gap  7


 


BUN  22 H


 


Creatinine  0.65


 


Est GFR (African Amer)  > 60


 


Glucose  124 H


 


Calcium  8.1 L


 


Phosphorus  4.9 H


 


Magnesium  2.1


 


Total Bilirubin  0.8


 


AST  20


 


Alkaline Phosphatase  184 H


 


Total Protein  4.9 L


 


Albumin  2.0 L


 


Prealbumin  3.9 L








                                        





11/16/20 04:00   Blood   Blood Culture (PCR) - Final


                            Staphylococcus Species


11/16/20 05:30   Blood   Blood Culture (PCR) - Final


                            Staphylococcus Species


11/16/20 02:35   Clean Catch Midstream   Urine Culture - Final


                            NO GROWTH 2 DAYS





                                        











  11/15/20





  22:52


 


Troponin I  < 0.012











Impressions: 


                                        





Abdomen/Pelvis CT  11/15/20 22:35


IMPRESSION:


 


1.  Stable pancreatic head mass and multiple hepatic metastases. 


Biliary drain and pneumobilia are also stable from the prior CT.


 


2.  Mild free fluid within the abdomen and pelvis, increased from


the prior study.


 


3.  Tiny bilateral pleural effusions and mild basilar atelectasis


or infiltration.


 


 


 


TECHNICAL DOCUMENTATION:


 


Quality ID # 436: Final reports with documentation of one or more


dose reduction techniques (e.g., Automated exposure control,


adjustment of the mA and/or kV according to patient size, use of


iterative reconstruction technique)


 


copyright 2011 [employer]- All Rights Reserved


 








Venous Doppler Study  11/16/20 08:07


IMPRESSION:  1.  POSITIVE EXAM. EVIDENCE OF DVT AND SVT LEFT ARM AS ABOVE.


 














Assessment and Plan





- Diagnosis


(1) Pancreatic cancer metastasized to liver


Is this a current diagnosis for this admission?: Yes   


Plan: 


Oncology is following.  Patient started on first cycle of FOLFIRINOX 11/8.  He 

he is receiving 5-FU as a slow infusion via pump until Friday. 


Continue oxycodone for patient's neoplasm related abdominal pain.  On Colace and

lubiprostone.  MiraLAX as needed.


Encourage adequate nutrition


Physical therapy








(2) DVT of left axillary vein, acute


Is this a current diagnosis for this admission?: Yes   


Plan: 


Swelling is a little bit better today.  Continue therapeutic Lovenox.  Arm 

elevation.  Monitor closely for any evidence of compartment syndrome.








(3) GIUSEPPE (acute kidney injury)


Is this a current diagnosis for this admission?: Yes   


Plan: 


Resolved.  Creatinine looks good this morning.  I will slow down lactated 

Ringer's to 50 cc/h.  If he is drinking even somewhat sufficiently today, we 

will plan to discontinue fluids tomorrow.








(4) Leukocytosis


Qualifiers: 


   Leukocytosis type: unspecified   Qualified Code(s): D72.829 - Elevated white 

blood cell count, unspecified   


Is this a current diagnosis for this admission?: Yes   


Plan: 


Etiology is questionable at this time.  Could be from malignancy or DVT or 

infection. Monitor CBC. Discontinue Cefepime.





Patient's oncologist has been consulted and will appreciate any recommendations 

as to whether there is suspicion that patient leukocytosis is of noninfectious 

etiology from malignancy.








(5) Coagulase negative Staphylococcus bacteremia


Is this a current diagnosis for this admission?: Yes   


Plan: 


Patient has Methicillin resistant Staph epi in 1/2 bottles from a set and Staph 

Simulans in another bottle from another set 


While it is unclear whether this is a true bacteremia or contaminant but 

polymicrobial nature strongly suggests contaminant.


I will continue vancomycin while we await repeat blood cultures.








(6) Malnutrition


Qualifiers: 


   Malnutrition type: protein-calorie malnutrition   Protein-calorie 

malnutrition severity: moderate   Qualified Code(s): E44.0 - Moderate protein-

calorie malnutrition   


Is this a current diagnosis for this admission?: Yes   


Plan: 


Secondary to malignancy


Prealbumin is quite low.  Was seen by the dietitian who recommended chopped diet

which he is on.  Ensure supplements.








(7) Nausea & vomiting


Qualifiers: 


   Vomiting type: unspecified   Vomiting Intractability: intractable   Qualified

Code(s): R11.2 - Nausea with vomiting, unspecified   


Is this a current diagnosis for this admission?: Yes   


Plan: 


Phenergan as needed








(8) Hypotension


Qualifiers: 


   Hypotension type: hypotension due to hypovolemia   Qualified Code(s): I95.89 

- Other hypotension; E86.1 - Hypovolemia   


Is this a current diagnosis for this admission?: Yes   


Plan: 


BP has improved.  Monitor vitals closely.








- Time


Time Spent with patient: 15-24 minutes


Anticipated Discharge Disposition: Home, Self Care


Anticipated Discharge Timeframe: monday
Subjective


Date:: 11/19/20


Subjective:: 


Feeling better actually today.





Reason For Visit: 


FLOFIRIFOX








Physical Exam


Vital Signs: 


                                        











Temp Pulse Resp BP Pulse Ox


 


 97.5 F   79   24 H  110/52 L  95 


 


 11/19/20 04:25  11/19/20 07:00  11/19/20 04:25  11/19/20 04:25  11/19/20 04:25








                                 Intake & Output











 11/18/20 11/19/20 11/20/20





 06:59 06:59 06:59


 


Intake Total 2920 1155.4 


 


Output Total  0 


 


Balance 2920 1155.4 


 


Weight 65.1 kg 67.2 kg 











General appearance: PRESENT: no acute distress, well-developed, well-nourished


Head exam: PRESENT: atraumatic, normocephalic


Eye exam: PRESENT: conjunctiva pink, EOMI, PERRLA.  ABSENT: scleral icterus


Ear exam: PRESENT: normal external ear exam


Mouth exam: PRESENT: moist, tongue midline


Neck exam: ABSENT: carotid bruit, JVD, lymphadenopathy, thyromegaly


Respiratory exam: PRESENT: clear to auscultation destiny.  ABSENT: rales, rhonchi, 

wheezes


Cardiovascular exam: PRESENT: RRR.  ABSENT: diastolic murmur, rubs, systolic 

murmur


Pulses: PRESENT: normal dorsalis pedis pul


Vascular exam: PRESENT: normal capillary refill


GI/Abdominal exam: PRESENT: normal bowel sounds, soft.  ABSENT: distended, 

guarding, mass, organolmegaly, rebound, tenderness


Rectal exam: PRESENT: deferred


Extremities exam: PRESENT: full ROM.  ABSENT: calf tenderness, clubbing, pedal 

edema


Neurological exam: PRESENT: alert, awake, oriented to person, oriented to place,

oriented to time, oriented to situation, CN II-XII grossly intact.  ABSENT: 

motor sensory deficit


Psychiatric exam: PRESENT: appropriate affect, normal mood.  ABSENT: homicidal 

ideation, suicidal ideation


Skin exam: PRESENT: dry, intact, warm.  ABSENT: cyanosis, rash





Results


Laboratory Results: 


                                        





                                 11/19/20 06:59 





                                        











  11/18/20 11/18/20 11/19/20





  15:22 15:22 06:59


 


WBC   35.7 H* 


 


RBC   3.13 L 


 


Hgb   8.4 L 


 


Hct   26.1 L 


 


MCV   84 


 


MCH   26.8 L 


 


MCHC   32.1 


 


RDW   16.9 H 


 


Plt Count   359 


 


Seg Neutrophils %    Not Reportable


 


Sodium   


 


Potassium   


 


Chloride   


 


Carbon Dioxide   


 


Anion Gap   


 


BUN   


 


Creatinine  0.52  


 


Est GFR ( Amer)  > 60  


 


Glucose   


 


Calcium   


 


Phosphorus   


 


Magnesium   


 


Total Bilirubin   


 


AST   


 


Alkaline Phosphatase   


 


Total Protein   


 


Albumin   


 


Prealbumin   














  11/19/20





  06:59


 


WBC 


 


RBC 


 


Hgb 


 


Hct 


 


MCV 


 


MCH 


 


MCHC 


 


RDW 


 


Plt Count 


 


Seg Neutrophils % 


 


Sodium  134.4 L


 


Potassium  4.2


 


Chloride  105


 


Carbon Dioxide  22


 


Anion Gap  7


 


BUN  22 H


 


Creatinine  0.65


 


Est GFR (African Amer)  > 60


 


Glucose  124 H


 


Calcium  8.1 L


 


Phosphorus  4.9 H


 


Magnesium  2.1


 


Total Bilirubin  0.8


 


AST  20


 


Alkaline Phosphatase  184 H


 


Total Protein  4.9 L


 


Albumin  2.0 L


 


Prealbumin  3.9 L








                                        





11/16/20 04:00   Blood   Blood Culture (PCR) - Final


                            Staphylococcus Species


11/16/20 05:30   Blood   Blood Culture (PCR) - Final


                            Staphylococcus Species


11/16/20 02:35   Clean Catch Midstream   Urine Culture - Final


                            NO GROWTH 2 DAYS





                                        











  11/15/20





  22:52


 


Troponin I  < 0.012











Impressions: 


                                        





Abdomen/Pelvis CT  11/15/20 22:35


IMPRESSION:


 


1.  Stable pancreatic head mass and multiple hepatic metastases. 


Biliary drain and pneumobilia are also stable from the prior CT.


 


2.  Mild free fluid within the abdomen and pelvis, increased from


the prior study.


 


3.  Tiny bilateral pleural effusions and mild basilar atelectasis


or infiltration.


 


 


 


TECHNICAL DOCUMENTATION:


 


Quality ID # 436: Final reports with documentation of one or more


dose reduction techniques (e.g., Automated exposure control,


adjustment of the mA and/or kV according to patient size, use of


iterative reconstruction technique)


 


copyright 2011 [employer]- All Rights Reserved


 








Venous Doppler Study  11/16/20 08:07


IMPRESSION:  1.  POSITIVE EXAM. EVIDENCE OF DVT AND SVT LEFT ARM AS ABOVE.


 














Assessment & Plan





- Diagnosis


(1) Nausea & vomiting


Qualifiers: 


   Vomiting type: unspecified   Vomiting Intractability: intractable   Qualified

Code(s): R11.2 - Nausea with vomiting, unspecified   


Is this a current diagnosis for this admission?: Yes   


Plan: 


Improving, but probably will worsen over the next 48 hours








(2) Pancreatic cancer metastasized to liver


Is this a current diagnosis for this admission?: Yes   


Plan: 


Cycle #1 given, we will need to watch him through the weekend as that will be 

when the side effect starts.  If he stable by Monday and able to eat and drink 

and keep up his fluid intake as well as get up to the restroom and back, he 

should discharge home thereafter.








- Time


Time Spent with patient: 35 or more minutes
Subjective


Date:: 11/20/20


Subjective:: 





Patient complained of nausea and vomiting today and cough.  Actively vomiting disha bustamante I was in the room this morning.  Vomiting was nonprojectile.  His oxygen 

level was around 91% on room air this morning.  He has not done much in terms of

ambulation now complains of pain in his right calf which has limited this.  He 

has no diagnosed DVT in the extremity but is already on therapeutic Lovenox for 

treatment of his left upper extremity DVT.  He also complains of hiccups which 

kept him up.  He did not do much yesterday seems start getting better today.  

Discussed with patient that his wife was in the room.


Reason For Visit: 


FLOFIRIFOX








Physical Exam


Vital Signs: 


                                        











Temp Pulse Resp BP Pulse Ox


 


 99.3 F   141 H  18   147/109 H  91 L


 


 11/20/20 16:55  11/20/20 16:55  11/20/20 11:37  11/20/20 16:55  11/20/20 16:55








                                 Intake & Output











 11/19/20 11/20/20 11/21/20





 06:59 06:59 06:59


 


Intake Total 1155.4 500 450


 


Output Total 0  


 


Balance 1155.4 500 450


 


Weight 67.2 kg 67.2 kg 67.2 kg











General appearance: PRESENT: cooperative, mild distress.  ABSENT: well-

developed, well-nourished


Head exam: PRESENT: normocephalic


Neck exam: ABSENT: JVD


Respiratory exam: PRESENT: clear to auscultation destiny, symmetrical, unlabored, 

other - Appears very fatigued.  ABSENT: tachypnea, wheezes


Cardiovascular exam: PRESENT: RRR, +S1, +S2.  ABSENT: tachycardia


GI/Abdominal exam: PRESENT: distended - Mild, organolmegaly, soft, tenderness.  

ABSENT: firm, guarding, rebound, rigid


Neurological exam: PRESENT: alert, awake, oriented to person, oriented to place,

oriented to time


Psychiatric exam: ABSENT: agitated, anxious





Results


Laboratory Results: 


                                        





                                 11/20/20 05:40 





                                 11/20/20 05:40 





                                        











  11/20/20 11/20/20





  05:40 05:40


 


WBC  42.4 H* 


 


RBC  2.93 L 


 


Hgb  7.9 L 


 


Hct  24.7 L 


 


MCV  84 


 


MCH  27.0 


 


MCHC  32.1 


 


RDW  17.1 H 


 


Plt Count  307 


 


Seg Neutrophils %  Not Reportable 


 


Sodium   135.9 L


 


Potassium   4.0


 


Chloride   105


 


Carbon Dioxide   25


 


Anion Gap   6


 


BUN   25 H


 


Creatinine   0.76


 


Est GFR (African Amer)   > 60


 


Glucose   94


 


Calcium   7.9 L


 


Phosphorus   3.9


 


Magnesium   2.1


 


Total Bilirubin   0.8


 


AST   33


 


Alkaline Phosphatase   197 H


 


Total Protein   4.5 L


 


Albumin   1.9 L








                                        











  11/15/20





  22:52


 


Troponin I  < 0.012











Impressions: 


                                        





Abdomen/Pelvis CT  11/15/20 22:35


IMPRESSION:


 


1.  Stable pancreatic head mass and multiple hepatic metastases. 


Biliary drain and pneumobilia are also stable from the prior CT.


 


2.  Mild free fluid within the abdomen and pelvis, increased from


the prior study.


 


3.  Tiny bilateral pleural effusions and mild basilar atelectasis


or infiltration.


 


 


 


TECHNICAL DOCUMENTATION:


 


Quality ID # 436: Final reports with documentation of one or more


dose reduction techniques (e.g., Automated exposure control,


adjustment of the mA and/or kV according to patient size, use of


iterative reconstruction technique)


 


copyright 2011 [employer]- All Rights Reserved


 








Venous Doppler Study  11/16/20 08:07


IMPRESSION:  1.  POSITIVE EXAM. EVIDENCE OF DVT AND SVT LEFT ARM AS ABOVE.


 








Chest X-Ray  11/20/20 00:00


IMPRESSION:  Right basilar airspace disease.  Small pleural effusions.  Findings

are accentuated by low lung volumes.


 














Assessment and Plan





- Diagnosis


(1) Pancreatic cancer metastasized to liver


Is this a current diagnosis for this admission?: Yes   


Plan: 


Oncology is following.  Patient started on first cycle of FOLFIRINOX 11/8.  He 

he is receiving 5-FU as a slow infusion via pump which was stopped today. 


Continue oxycodone for patient's neoplasm related abdominal pain.  On Colace and

lubiprostone.  MiraLAX as needed.


Encourage adequate nutrition


Physical therapy


Incentive spirometer


We will monitor him in for side effects of chemo through the weekend








(2) DVT of left axillary vein, acute


Is this a current diagnosis for this admission?: Yes   


Plan: 


Swelling is a little bit better today.  Continue therapeutic Lovenox.  Arm 

elevation.  Monitor closely for any evidence of compartment syndrome.








(3) Leukocytosis


Qualifiers: 


   Leukocytosis type: unspecified   Qualified Code(s): D72.829 - Elevated white 

blood cell count, unspecified   


Is this a current diagnosis for this admission?: Yes   


Plan: 


Etiology is questionable at this time.  Monitor CBC. 


I discussed with Dr. Quintero today regarding potential etiology.  We have 

decided we will treat patient with broad antibiotics for potential occult 

infection and see if any improvement.  He agrees it could be due to malignancy 

or DVT or infection as stated before.








(4) Coagulase negative Staphylococcus bacteremia


Is this a current diagnosis for this admission?: Yes   


Plan: 


Patient has Methicillin resistant Staph epi in 1/2 bottles from a set and Staph 

Simulans in another bottle from another set 


While it is unclear whether this is a true bacteremia or contaminant but 

polymicrobial nature strongly suggests contaminant.


I will continue vancomycin.  Plan is to treat for 7-day course in light of 

leukocytosis








(5) Malnutrition


Qualifiers: 


   Malnutrition type: protein-calorie malnutrition   Protein-calorie 

malnutrition severity: moderate   Qualified Code(s): E44.0 - Moderate protein-

calorie malnutrition   


Is this a current diagnosis for this admission?: Yes   


Plan: 


Secondary to malignancy


Prealbumin is quite low.  Was seen by the dietitian who recommended chopped diet

which he is on.  Ensure supplements.  We will do a calorie count.


He ate poorly yesterday but seems to have eating a little bit better today








(6) Nausea & vomiting


Qualifiers: 


   Vomiting type: unspecified   Vomiting Intractability: intractable   Qualified

Code(s): R11.2 - Nausea with vomiting, unspecified   


Is this a current diagnosis for this admission?: Yes   


Plan: 


Phenergan as needed.








(7) GIUSEPPE (acute kidney injury)


Is this a current diagnosis for this admission?: Yes   


Plan: 


Resolved.  








(8) Hypotension


Qualifiers: 


   Hypotension type: hypotension due to hypovolemia   Qualified Code(s): I95.89 

- Other hypotension; E86.1 - Hypovolemia   


Is this a current diagnosis for this admission?: Yes   





- Time


Time Spent with patient: 25-34 minutes


Anticipated Discharge Disposition: Home, Self Care


Anticipated Discharge Timeframe: within 72 hours
Subjective


Date:: 11/20/20


Subjective:: 


Doing okay this morning but having hiccups and reflux overnight.  Sat on the 

side of the bed but needs to get up and move.  Having pain with the right lower 

extremity DVT.  Left upper extremity DVT is better.





Reason For Visit: 


FLOFIRIFOX








Physical Exam


Vital Signs: 


                                        











Temp Pulse Resp BP Pulse Ox


 


 98.2 F   92   18   120/49 L  90 L


 


 11/20/20 08:11  11/20/20 07:58  11/20/20 07:58  11/20/20 07:58  11/20/20 07:58








                                 Intake & Output











 11/19/20 11/20/20 11/21/20





 06:59 06:59 06:59


 


Intake Total 1155.4 500 


 


Output Total 0  


 


Balance 1155.4 500 


 


Weight 67.2 kg 67.2 kg 











General appearance: PRESENT: no acute distress, well-developed, well-nourished


Head exam: PRESENT: atraumatic, normocephalic


Eye exam: PRESENT: conjunctiva pink, EOMI, PERRLA.  ABSENT: scleral icterus


Ear exam: PRESENT: normal external ear exam


Mouth exam: PRESENT: moist, tongue midline


Neck exam: ABSENT: carotid bruit, JVD, lymphadenopathy, thyromegaly


Respiratory exam: PRESENT: clear to auscultation destiny.  ABSENT: rales, rhonchi, 

wheezes


Cardiovascular exam: PRESENT: RRR.  ABSENT: diastolic murmur, rubs, systolic 

murmur


Pulses: PRESENT: normal dorsalis pedis pul


Vascular exam: PRESENT: normal capillary refill


GI/Abdominal exam: PRESENT: normal bowel sounds, soft.  ABSENT: distended, 

guarding, mass, organolmegaly, rebound, tenderness


Rectal exam: PRESENT: deferred


Extremities exam: PRESENT: full ROM.  ABSENT: calf tenderness, clubbing, pedal 

edema


Neurological exam: PRESENT: alert, awake, oriented to person, oriented to place,

oriented to time, oriented to situation, CN II-XII grossly intact.  ABSENT: 

motor sensory deficit


Psychiatric exam: PRESENT: appropriate affect, normal mood.  ABSENT: homicidal 

ideation, suicidal ideation


Skin exam: PRESENT: dry, intact, warm.  ABSENT: cyanosis, rash





Results


Laboratory Results: 


                                        





                                 11/20/20 05:40 





                                 11/20/20 05:40 





                                        











  11/19/20 11/20/20 11/20/20





  06:59 05:40 05:40


 


WBC  38.2 H*  42.4 H* 


 


RBC  3.05 L  2.93 L 


 


Hgb  8.3 L  7.9 L 


 


Hct  25.6 L  24.7 L 


 


MCV  84  84 


 


MCH  27.3  27.0 


 


MCHC  32.4  32.1 


 


RDW  16.8 H  17.1 H 


 


Plt Count  331  307 


 


Seg Neutrophils %   Not Reportable 


 


Sodium    135.9 L


 


Potassium    4.0


 


Chloride    105


 


Carbon Dioxide    25


 


Anion Gap    6


 


BUN    25 H


 


Creatinine    0.76


 


Est GFR ( Amer)    > 60


 


Glucose    94


 


Calcium    7.9 L


 


Phosphorus    3.9


 


Magnesium    2.1


 


Total Bilirubin    0.8


 


AST    33


 


Alkaline Phosphatase    197 H


 


Total Protein    4.5 L


 


Albumin    1.9 L








                                        





11/16/20 05:30   Blood   Blood Culture (PCR) - Final


                            Staphylococcus Species


11/16/20 05:30   Blood   Blood Culture - Final


                            Staphylococcus Epidermidis


11/16/20 04:00   Blood   Blood Culture (PCR) - Final


                            Staphylococcus Species


11/16/20 04:00   Blood   Blood Culture - Final


                            Staphylococcus Simulans





                                        











  11/15/20





  22:52


 


Troponin I  < 0.012











Impressions: 


                                        





Abdomen/Pelvis CT  11/15/20 22:35


IMPRESSION:


 


1.  Stable pancreatic head mass and multiple hepatic metastases. 


Biliary drain and pneumobilia are also stable from the prior CT.


 


2.  Mild free fluid within the abdomen and pelvis, increased from


the prior study.


 


3.  Tiny bilateral pleural effusions and mild basilar atelectasis


or infiltration.


 


 


 


TECHNICAL DOCUMENTATION:


 


Quality ID # 436: Final reports with documentation of one or more


dose reduction techniques (e.g., Automated exposure control,


adjustment of the mA and/or kV according to patient size, use of


iterative reconstruction technique)


 


copyright 2011 [employer]- All Rights Reserved


 








Venous Doppler Study  11/16/20 08:07


IMPRESSION:  1.  POSITIVE EXAM. EVIDENCE OF DVT AND SVT LEFT ARM AS ABOVE.


 














Assessment & Plan





- Diagnosis


(1) Nausea & vomiting


Qualifiers: 


   Vomiting type: unspecified   Vomiting Intractability: intractable   Qualified

Code(s): R11.2 - Nausea with vomiting, unspecified   


Is this a current diagnosis for this admission?: Yes   


Plan: 


Continue current therapy








(2) Pancreatic cancer metastasized to liver


Is this a current diagnosis for this admission?: Yes   


Plan: 


Pump office today, side effect profile will worsen over the weekend.  Continue 

with supportive care.








- Time


Time Spent with patient: 35 or more minutes
Subjective


Date:: 11/21/20


Subjective:: 





Patient is still quite fatigued.  He was able to drink some Ensure as according 

to his wife yesterday.  He has not eating much of his breakfast this morning.  

Has pain in his right cough.  Denies any pain in his knee joint or his right 

ankle.  I did explain possibility that he could have a clot in his right leg but

either way he is already on therapeutic anticoagulation for his DVT in his left 

arm.  His abdominal pain he is having adequate control.  He did get up and spend

most of the time in the chair yesterday.  Ambulation is limited by his leg pain.

 He was able to get a better sleep last night as his hiccups had diminished


Reason For Visit: 


FLOFIRIFOX








Physical Exam


Vital Signs: 


                                        











Temp Pulse Resp BP Pulse Ox


 


 98.1 F   95   19   123/56 L  92 


 


 11/21/20 08:38  11/21/20 08:00  11/21/20 08:00  11/21/20 08:00  11/21/20 08:00








                                 Intake & Output











 11/20/20 11/21/20 11/22/20





 06:59 06:59 06:59


 


Intake Total 500 1750 250


 


Output Total  500 


 


Balance 500 1250 250


 


Weight 67.2 kg 69.9 kg 











General appearance: PRESENT: no acute distress, cooperative


Neck exam: ABSENT: JVD


Respiratory exam: PRESENT: symmetrical, unlabored.  ABSENT: tachypnea, wheezes


Cardiovascular exam: PRESENT: RRR, +S1, +S2.  ABSENT: tachycardia


GI/Abdominal exam: PRESENT: distended, organolmegaly, soft, tenderness.  ABSENT:

firm, guarding, rebound, rigid


Neurological exam: PRESENT: alert, awake, oriented to person, oriented to place,

oriented to time, other - Tremors in hand


Psychiatric exam: ABSENT: agitated, anxious





Results


Laboratory Results: 


                                        





                                 11/21/20 06:00 





                                 11/20/20 05:40 





                                        











  11/20/20 11/21/20 11/21/20





  05:40 06:00 06:00


 


WBC   31.2 H* 


 


RBC   2.68 L 


 


Hgb  7.9 L  7.2 L 


 


Hct   22.5 L 


 


MCV   84 


 


MCH   27.0 


 


MCHC   32.1 


 


RDW   17.0 H 


 


Plt Count   204 


 


Seg Neutrophils %   Not Reportable 


 


Magnesium    2.0


 


Blood Type   


 


Antibody Screen   














  11/21/20





  06:00


 


WBC 


 


RBC 


 


Hgb 


 


Hct 


 


MCV 


 


MCH 


 


MCHC 


 


RDW 


 


Plt Count 


 


Seg Neutrophils % 


 


Magnesium 


 


Blood Type  A POSITIVE


 


Antibody Screen  NEGATIVE








                                        











  11/15/20





  22:52


 


Troponin I  < 0.012











Impressions: 


                                        





Abdomen/Pelvis CT  11/15/20 22:35


IMPRESSION:


 


1.  Stable pancreatic head mass and multiple hepatic metastases. 


Biliary drain and pneumobilia are also stable from the prior CT.


 


2.  Mild free fluid within the abdomen and pelvis, increased from


the prior study.


 


3.  Tiny bilateral pleural effusions and mild basilar atelectasis


or infiltration.


 


 


 


TECHNICAL DOCUMENTATION:


 


Quality ID # 436: Final reports with documentation of one or more


dose reduction techniques (e.g., Automated exposure control,


adjustment of the mA and/or kV according to patient size, use of


iterative reconstruction technique)


 


copyright 2011 [employer]- All Rights Reserved


 








Venous Doppler Study  11/16/20 08:07


IMPRESSION:  1.  POSITIVE EXAM. EVIDENCE OF DVT AND SVT LEFT ARM AS ABOVE.


 








Chest X-Ray  11/20/20 00:00


IMPRESSION:  Right basilar airspace disease.  Small pleural effusions.  Findings

are accentuated by low lung volumes.


 














Assessment and Plan





- Diagnosis


(1) Pancreatic cancer metastasized to liver


Is this a current diagnosis for this admission?: Yes   


Plan: 


Oncology is following.  Patient has completed first cycle of FOLFIRINOX 11/8 

with 5FU infusion concluded 11/20. 


Continue oxycodone for patient's neoplasm related abdominal pain.  On Colace and

lubiprostone.  MiraLAX as needed.


Encourage adequate nutrition


Physical therapy


Incentive spirometer 


we will manage his side effects from his chemo through the weekend.











(2) DVT of left axillary vein, acute


Is this a current diagnosis for this admission?: Yes   


Plan: 


Continue therapeutic Lovenox.  Arm elevation.  Monitor closely for any evidence 

of compartment syndrome.








(3) Pain of left calf


Is this a current diagnosis for this admission?: Yes   


Plan: 


Pain control as needed.  This possibly could have a DVT this well but he is 

already on therapeutic Lovenox for the DVT in his arm.  Also could be cramping 

from which is not unusual from his chemo regimen. He does have baclofen ordered 

also for hiccups.








(4) Leukocytosis


Qualifiers: 


   Leukocytosis type: unspecified   Qualified Code(s): D72.829 - Elevated white 

blood cell count, unspecified   


Is this a current diagnosis for this admission?: Yes   


Plan: 


Etiology is questionable at this time.  Monitor CBC.  Improved today but I 

suspect secondary to chemotherapy.


I discussed with Dr. Quintero today regarding potential etiology.  He agrees it 

could be due to malignancy or DVT but should cover with antibiotics for a couple

of days just in case there is an occult infection.  Notably he is on vancomycin 

and ceftriaxone.  


Chest x-ray on my interpretation of the images does not seem to show any right 

basilar infiltrates but does show elevated right hemidiaphragm likely due to 

hepatomegaly and some fullness in his perihilar right middle lobe region.








(5) Coagulase negative Staphylococcus bacteremia


Is this a current diagnosis for this admission?: Yes   


Plan: 


Patient has Methicillin resistant Staph epi in 1/2 bottles from a set and Staph 

Simulans in another bottle from another set 


While it is unclear whether this is a true bacteremia or contaminant but 

polymicrobial nature strongly suggests contaminant.


I will continue vancomycin.  Plan is to treat for 7-day course in light of 

leukocytosis








(6) Malnutrition


Qualifiers: 


   Malnutrition type: protein-calorie malnutrition   Protein-calorie 

malnutrition severity: moderate   Qualified Code(s): E44.0 - Moderate protein-

calorie malnutrition   


Is this a current diagnosis for this admission?: Yes   


Plan: 


Secondary to malignancy


Prealbumin is quite low.  Was seen by the dietitian who recommended chopped diet

which he is on.  Ensure supplements.  We will do a calorie count.


He ate poorly yesterday but seems to have eating a little bit better today








(7) Nausea & vomiting


Qualifiers: 


   Vomiting type: unspecified   Vomiting Intractability: intractable   Qualified

Code(s): R11.2 - Nausea with vomiting, unspecified   


Is this a current diagnosis for this admission?: Yes   


Plan: 


Phenergan dose increased for his nausea vomiting.  EKG shows borderline QT 

prolongation. Gentle IVF to avoid dehydration.








(8) GIUSEPPE (acute kidney injury)


Is this a current diagnosis for this admission?: Yes   





(9) Hypotension


Qualifiers: 


   Hypotension type: hypotension due to hypovolemia   Qualified Code(s): I95.89 

- Other hypotension; E86.1 - Hypovolemia   


Is this a current diagnosis for this admission?: Yes   


Plan: 


resolved








- Time


Time Spent with patient: 15-24 minutes


Anticipated Discharge Disposition: Home with Home Health


Anticipated Discharge Timeframe: within 72 hours
Subjective


Date:: 11/22/20


Subjective:: 





Patient states that he is feeling very weak.  He has some nausea and pain, espec

ially with coughing.  He states that the pain and nausea meds are working well. 

Nurses have no concerns.   


Reason For Visit: 


FLOFIRIFOX








Physical Exam


Vital Signs: 


                                        











Temp Pulse Resp BP Pulse Ox


 


 98.4 F   104 H  24 H  96/59 L  93 


 


 11/21/20 20:25  11/22/20 07:00  11/21/20 20:25  11/21/20 20:25  11/21/20 20:25








                                 Intake & Output











 11/21/20 11/22/20 11/23/20





 06:59 06:59 06:59


 


Intake Total 1750 675 


 


Output Total 500 1700 


 


Balance 1250 -1025 


 


Weight 69.9 kg 69.9 kg 











General appearance: PRESENT: no acute distress


Head exam: PRESENT: normocephalic


Eye exam: PRESENT: EOMI


Respiratory exam: PRESENT: clear to auscultation destiny, unlabored


Cardiovascular exam: PRESENT: RRR


GI/Abdominal exam: PRESENT: normal bowel sounds, soft


Extremities exam: PRESENT: +1 edema - bilateral ankles.


Neurological exam: PRESENT: alert, awake


Psychiatric exam: PRESENT: flat affect


Skin exam: PRESENT: normal color





Results


Laboratory Results: 


                                        





                                 11/22/20 06:40 





                                 11/20/20 05:40 





                                        











  11/20/20 11/21/20 11/22/20





  05:40 06:00 06:40


 


WBC    20.3 H


 


RBC    2.47 L


 


Hgb  7.9 L   6.8 L


 


Hct    20.8 L


 


MCV    84


 


MCH    27.6


 


MCHC    32.8


 


RDW    17.4 H


 


Plt Count    123 L


 


Blood Type   A POSITIVE 


 


Antibody Screen   NEGATIVE 








                                        











  11/15/20





  22:52


 


Troponin I  < 0.012











Impressions: 


                                        





Abdomen/Pelvis CT  11/15/20 22:35


IMPRESSION:


 


1.  Stable pancreatic head mass and multiple hepatic metastases. 


Biliary drain and pneumobilia are also stable from the prior CT.


 


2.  Mild free fluid within the abdomen and pelvis, increased from


the prior study.


 


3.  Tiny bilateral pleural effusions and mild basilar atelectasis


or infiltration.


 


 


 


TECHNICAL DOCUMENTATION:


 


Quality ID # 436: Final reports with documentation of one or more


dose reduction techniques (e.g., Automated exposure control,


adjustment of the mA and/or kV according to patient size, use of


iterative reconstruction technique)


 


copyright 2011 [employer]- All Rights Reserved


 








Venous Doppler Study  11/16/20 08:07


IMPRESSION:  1.  POSITIVE EXAM. EVIDENCE OF DVT AND SVT LEFT ARM AS ABOVE.


 








Chest X-Ray  11/20/20 00:00


IMPRESSION:  Right basilar airspace disease.  Small pleural effusions.  Findings

are accentuated by low lung volumes.


 














Assessment & Plan





- Diagnosis


(1) Coagulase negative Staphylococcus bacteremia


Is this a current diagnosis for this admission?: Yes   


Plan: 


Continue antibiotics.  Most recent blood cultures are NGTD








(2) DVT of left axillary vein, acute


Is this a current diagnosis for this admission?: Yes   


Plan: 


COntinue Lovenox 1 mg/kg BID.  Watch for signs of bleeding.  His HGB is <7.  1 

units pRBCs has been ordered.  








(3) Pancreatic cancer metastasized to liver


Is this a current diagnosis for this admission?: Yes   


Plan: 


s/p chemotherapy.  Await blood count chuy.  Supportive care. 








- Time


Time Spent with patient: 15-24 minutes
Subjective


Date:: 11/22/20


Subjective:: 





Seen patient twice today.  He is quite fatigued and has not been able to get up 

today to even ambulate to chair.  He feels exhausted.  He has had few episodes 

of vomiting.  He is able to take some Ensure but has not been able to eat any of

his meals.  His wife was at bedside helping him with fluid intake.  He did have 

a bowel movement today.  Has some coughing fits occasionally.  Still having some

tremors especially his right arm.  His right calf pain is only upon standing.  

Otherwise he and his wife state that his pain is well controlled.


Reason For Visit: 


FLOFIRIFOX








Physical Exam


Vital Signs: 


                                        











Temp Pulse Resp BP Pulse Ox


 


 98.5 F   78   18   97/54 L  97 


 


 11/22/20 12:00  11/22/20 12:00  11/22/20 12:00  11/22/20 12:00  11/22/20 12:00








                                 Intake & Output











 11/21/20 11/22/20 11/23/20





 06:59 06:59 06:59


 


Intake Total 5613 779 2708


 


Output Total 500 1700 


 


Balance 1250 -1025 1650


 


Weight 69.9 kg 69.9 kg 











General appearance: PRESENT: cooperative, mild distress - Coughing, other - 

Appears very fatigued.  ABSENT: well-nourished


Neck exam: ABSENT: JVD


Respiratory exam: PRESENT: clear to auscultation destiny, unlabored.  ABSENT: 

crackles, tachypnea, wheezes


Cardiovascular exam: PRESENT: +S1, +S2, tachycardia.  ABSENT: irregular rhythm


GI/Abdominal exam: PRESENT: distended, normal bowel sounds, organolmegaly - 

Hepatomegaly, soft, tenderness.  ABSENT: firm, guarding, rebound, rigid


Extremities exam: ABSENT: calf tenderness


Neurological exam: PRESENT: alert, awake, oriented to person, oriented to place,

oriented to time





Results


Laboratory Results: 


                                        





                                 11/22/20 13:20 





                                 11/20/20 05:40 





                                        











  11/21/20 11/22/20 11/22/20





  06:00 06:40 13:20


 


WBC   20.3 H  22.8 H


 


RBC   2.47 L  2.91 L


 


Hgb   6.8 L  8.1 L


 


Hct   20.8 L  24.5 L


 


MCV   84  84


 


MCH   27.6  27.9


 


MCHC   32.8  33.2


 


RDW   17.4 H  16.6 H


 


Plt Count   123 L  106 L


 


Ammonia   


 


Blood Type  A POSITIVE  


 


Antibody Screen  NEGATIVE  














  11/22/20





  13:20


 


WBC 


 


RBC 


 


Hgb 


 


Hct 


 


MCV 


 


MCH 


 


MCHC 


 


RDW 


 


Plt Count 


 


Ammonia  < 8.7 L


 


Blood Type 


 


Antibody Screen 








                                        











  11/15/20





  22:52


 


Troponin I  < 0.012











Impressions: 


                                        





Abdomen/Pelvis CT  11/15/20 22:35


IMPRESSION:


 


1.  Stable pancreatic head mass and multiple hepatic metastases. 


Biliary drain and pneumobilia are also stable from the prior CT.


 


2.  Mild free fluid within the abdomen and pelvis, increased from


the prior study.


 


3.  Tiny bilateral pleural effusions and mild basilar atelectasis


or infiltration.


 


 


 


TECHNICAL DOCUMENTATION:


 


Quality ID # 436: Final reports with documentation of one or more


dose reduction techniques (e.g., Automated exposure control,


adjustment of the mA and/or kV according to patient size, use of


iterative reconstruction technique)


 


copyright 2011 [employer]- All Rights Reserved


 








Venous Doppler Study  11/16/20 08:07


IMPRESSION:  1.  POSITIVE EXAM. EVIDENCE OF DVT AND SVT LEFT ARM AS ABOVE.


 








Chest X-Ray  11/20/20 00:00


IMPRESSION:  Right basilar airspace disease.  Small pleural effusions.  Findings

are accentuated by low lung volumes.


 














Assessment and Plan





- Diagnosis


(1) Pancreatic cancer metastasized to liver


Is this a current diagnosis for this admission?: Yes   


Plan: 


Oncology is following.  Patient has completed first cycle of FOLFIRINOX 11/8 

with 5FU infusion concluded 11/20. 


Continue oxycodone for patient's neoplasm related abdominal pain.  On Colace and

lubiprostone.  MiraLAX as needed.  Had good size bowel movement today.


His physical status has declined. Patient continues to struggle with nutrition. 

He is also very fatigued and unable to do much with PT.











(2) DVT of left axillary vein, acute


Is this a current diagnosis for this admission?: Yes   


Plan: 


Continue therapeutic Lovenox.  Arm elevation.  








(3) Malnutrition


Qualifiers: 


   Malnutrition type: protein-calorie malnutrition   Protein-calorie 

malnutrition severity: moderate   Qualified Code(s): E44.0 - Moderate protein-

calorie malnutrition   


Is this a current diagnosis for this admission?: Yes   


Plan: 


Secondary to malignancy, nausea/vomiting.


Prealbumin is quite low.  Was seen by the dietitian who recommended chopped diet

which he is on.  Ensure supplements.  


Calorie count.








(4) Pain of left calf


Is this a current diagnosis for this admission?: Yes   


Plan: 


Pain control as needed.  He possibly could have a DVT this well but he is 

already on therapeutic Lovenox for the DVT in his arm.  Also could be cramping 

from which is not unusual from his chemo regimen. He does have baclofen ordered 

also for hiccups.








(5) Leukocytosis


Qualifiers: 


   Leukocytosis type: unspecified   Qualified Code(s): D72.829 - Elevated white 

blood cell count, unspecified   


Is this a current diagnosis for this admission?: Yes   


Plan: 


Etiology is questionable at this time.  Monitor CBC.  Went down but I suspect 

secondary to chemotherapy.


I discussed with Dr. Quintero today regarding potential etiology.  He agrees it 

could be due to malignancy or DVT but should cover with antibiotics for a couple

of days just in case there is an occult infection.  He is on vancomycin and 

ceftriaxone.  


Chest x-ray on my interpretation of the images does not seem to show any right 

basilar infiltrates but does show elevated right hemidiaphragm likely due to 

hepatomegaly and some fullness in his perihilar right middle lobe region.








(6) Coagulase negative Staphylococcus bacteremia


Is this a current diagnosis for this admission?: Yes   


Plan: 


Patient has Methicillin resistant Staph epi in 1/2 bottles from a set and Staph 

Simulans in another bottle from another set 


While it is unclear whether this is a true bacteremia or contaminant but 

polymicrobial nature strongly suggests contaminant.


On vancomycin.








(7) Nausea & vomiting


Qualifiers: 


   Vomiting type: unspecified   Vomiting Intractability: intractable   Qualified

Code(s): R11.2 - Nausea with vomiting, unspecified   


Is this a current diagnosis for this admission?: Yes   


Plan: 


Phenergan as needed.  Gentle IVF with D5 saline at 40 cc/h.  Monitor 

electrolytes.  Repeat EKG to see if his QTC has improved and if so I will add 

some Reglan








(8) GIUSEPPE (acute kidney injury)


Is this a current diagnosis for this admission?: Yes   


Plan: 


Resolved.  Check renal function








(9) Hypotension


Qualifiers: 


   Hypotension type: hypotension due to hypovolemia   Qualified Code(s): I95.89 

- Other hypotension; E86.1 - Hypovolemia   


Is this a current diagnosis for this admission?: Yes   


Plan: 


resolved








- Time


Time Spent with patient: 25-34 minutes


Anticipated Discharge Disposition:  vs SNF


Anticipated Discharge Timeframe: within 72 hours
Subjective


Date:: 11/23/20


Subjective:: 





Appears chronically ill.


Patient was seen and examined today.  Is not tolerating diet.  Complains of 

nausea and vomiting.  Not able to take his oral medications.











Reason For Visit: 


FLOFIRIFOX








Physical Exam


Vital Signs: 


                                        











Temp Pulse Resp BP Pulse Ox


 


 98.7 F   97   26 H  117/58 L  94 


 


 11/23/20 04:49  11/23/20 07:00  11/23/20 04:49  11/23/20 04:49  11/23/20 04:49








                                 Intake & Output











 11/22/20 11/23/20 11/24/20





 06:59 06:59 06:59


 


Intake Total 675 3170 


 


Output Total 1700 850 


 


Balance -1025 2320 


 


Weight 154 lb 1.65 oz 129 lb 3.054 oz 











General appearance: PRESENT: thin


Head exam: PRESENT: atraumatic


Eye exam: PRESENT: EOMI


Ear exam: ABSENT: bleeding


Respiratory exam: PRESENT: clear to auscultation destiny


Cardiovascular exam: PRESENT: RRR, rubs, +S2


GI/Abdominal exam: PRESENT: normal bowel sounds, soft


Neurological exam: PRESENT: alert, awake, oriented to person, oriented to place,

oriented to time, oriented to situation


Psychiatric exam: PRESENT: anxious





Results


Laboratory Results: 


                                        





                                 11/23/20 07:36 





                                 11/23/20 06:22 





                                        











  11/22/20 11/22/20 11/22/20





  06:40 13:20 13:20


 


WBC   22.8 H 


 


RBC   2.91 L 


 


Hgb  6.8 L  8.1 L 


 


Hct   24.5 L 


 


MCV   84 


 


MCH   27.9 


 


MCHC   33.2 


 


RDW   16.6 H 


 


Plt Count   106 L 


 


Seg Neutrophils %   


 


Sodium   


 


Potassium   


 


Chloride   


 


Carbon Dioxide   


 


Anion Gap   


 


BUN   


 


Creatinine   


 


Est GFR (African Amer)   


 


Glucose   


 


Lactic Acid   


 


Calcium   


 


Magnesium   


 


Total Bilirubin   


 


AST   


 


Alkaline Phosphatase   


 


Ammonia    < 8.7 L


 


Total Protein   


 


Albumin   














  11/22/20 11/23/20 11/23/20





  17:06 01:20 06:22


 


WBC    Cancelled


 


RBC    Cancelled


 


Hgb    Cancelled


 


Hct    Cancelled


 


MCV    Cancelled


 


MCH    Cancelled


 


MCHC    Cancelled


 


RDW    Cancelled


 


Plt Count    Cancelled


 


Seg Neutrophils %    Cancelled


 


Sodium  138.6  


 


Potassium  3.5 L  


 


Chloride  111 H  


 


Carbon Dioxide  22  


 


Anion Gap  6  


 


BUN  21 H  


 


Creatinine  0.95  


 


Est GFR ( Amer)  > 60  


 


Glucose  106  


 


Lactic Acid   1.3 


 


Calcium  7.2 L  


 


Magnesium  2.1  


 


Total Bilirubin  0.8  


 


AST  29  


 


Alkaline Phosphatase  131 H  


 


Ammonia   


 


Total Protein  4.5 L  


 


Albumin  1.8 L  














  11/23/20 11/23/20





  06:22 07:36


 


WBC   16.7 H


 


RBC   2.96 L


 


Hgb   8.3 L


 


Hct   25.2 L


 


MCV   85


 


MCH   28.2


 


MCHC   33.1


 


RDW   16.9 H


 


Plt Count   56 L


 


Seg Neutrophils %   Not Reportable


 


Sodium  138.8 


 


Potassium  3.9 


 


Chloride  113 H 


 


Carbon Dioxide  20 L 


 


Anion Gap  6 


 


BUN  21 H 


 


Creatinine  0.86 


 


Est GFR (African Amer)  > 60 


 


Glucose  102 


 


Lactic Acid  


 


Calcium  6.8 L* 


 


Magnesium  


 


Total Bilirubin  1.0 


 


AST  25 


 


Alkaline Phosphatase  122 


 


Ammonia  


 


Total Protein  4.4 L 


 


Albumin  1.8 L 








                                        











  11/15/20





  22:52


 


Troponin I  < 0.012











Impressions: 


                                        





Abdomen/Pelvis CT  11/15/20 22:35


IMPRESSION:


 


1.  Stable pancreatic head mass and multiple hepatic metastases. 


Biliary drain and pneumobilia are also stable from the prior CT.


 


2.  Mild free fluid within the abdomen and pelvis, increased from


the prior study.


 


3.  Tiny bilateral pleural effusions and mild basilar atelectasis


or infiltration.


 


 


 


TECHNICAL DOCUMENTATION:


 


Quality ID # 436: Final reports with documentation of one or more


dose reduction techniques (e.g., Automated exposure control,


adjustment of the mA and/or kV according to patient size, use of


iterative reconstruction technique)


 


copyright 2011 [employer]- All Rights Reserved


 








Venous Doppler Study  11/16/20 08:07


IMPRESSION:  1.  POSITIVE EXAM. EVIDENCE OF DVT AND SVT LEFT ARM AS ABOVE.


 








Chest X-Ray  11/20/20 00:00


IMPRESSION:  Right basilar airspace disease.  Small pleural effusions.  Findings

are accentuated by low lung volumes.


 














Assessment and Plan





- Diagnosis


(1) GIUSEPPE (acute kidney injury)


Is this a current diagnosis for this admission?: Yes   


Plan: 


Resolved.  Check renal function








(2) Coagulase negative Staphylococcus bacteremia


Is this a current diagnosis for this admission?: Yes   


Plan: 


Patient has Staph epi in 1/2 bottles from a set and Staph Simulans in another 

bottle from another set 


While it is unclear whether this is a true bacteremia or contaminant but 

polymicrobial nature strongly suggests contaminant.


On vancomycin.








(3) DVT of left axillary vein, acute


Is this a current diagnosis for this admission?: Yes   


Plan: 


Continue therapeutic Lovenox.  Arm elevation.  








(4) Leukocytosis


Qualifiers: 


   Leukocytosis type: unspecified   Qualified Code(s): D72.829 - Elevated white 

blood cell count, unspecified   


Is this a current diagnosis for this admission?: Yes   


Plan: 


Etiology is questionable at this time.  Monitor CBC.  


Dr. Quintero agrees it could be due to malignancy or DVT but should cover with 

antibiotics for a couple of days just in case there is an occult infection.  He 

is on vancomycin and ceftriaxone.  


Chest x-ray on my interpretation of the images does not seem to show any right 

basilar infiltrates but does show elevated right hemidiaphragm likely due to 

hepatomegaly and some fullness in his perihilar right middle lobe region.








(5) Pain of left calf


Is this a current diagnosis for this admission?: Yes   


Plan: 


Pain control as needed.  He possibly could have a DVT this well but he is 

already on therapeutic Lovenox for the DVT in his arm.  Also could be cramping 

from which is not unusual from his chemo regimen. He does have baclofen ordered 

also for hiccups.








(6) Hypotension


Qualifiers: 


   Hypotension type: hypotension due to hypovolemia   Qualified Code(s): I95.89 

- Other hypotension; E86.1 - Hypovolemia   


Is this a current diagnosis for this admission?: Yes   


Plan: 


resolved








(7) Malnutrition


Qualifiers: 


   Malnutrition type: protein-calorie malnutrition   Protein-calorie 

malnutrition severity: moderate   Qualified Code(s): E44.0 - Moderate protein-

calorie malnutrition   


Is this a current diagnosis for this admission?: Yes   


Plan: 


Secondary to malignancy, nausea/vomiting.


Prealbumin is quite low.  Was seen by the dietitian who recommended chopped diet

which he is on.  Ensure supplements.  


Calorie count.








(8) Nausea & vomiting


Qualifiers: 


   Vomiting type: unspecified   Vomiting Intractability: intractable   Qualified

Code(s): R11.2 - Nausea with vomiting, unspecified   


Is this a current diagnosis for this admission?: Yes   


Plan: 


Antiemetics as needed.  Continue IV fluids.  Monitor electrolytes.  








(9) Pancreatic cancer metastasized to liver


Is this a current diagnosis for this admission?: Yes   


Plan: 


Oncology is following.  Patient has completed first cycle of FOLFIRINOX 11/8 

with 5FU infusion concluded 11/20. 


Continue oxycodone for patient's neoplasm related abdominal pain.  On Colace and

lubiprostone.  MiraLAX as needed.  


His physical status has declined. Patient continues to struggle with nutrition. 

He is also very fatigued and unable to do much with PT.











- Time


Anticipated Discharge Disposition: Home with Home Health


Anticipated Discharge Timeframe: within 72 hours
Subjective


Date:: 11/23/20


Subjective:: 





Patient currently coughing and with increased nausea.  Family is at bedside.  Th

ey report increased vomiting with coughing.  They believe it is his reflux.  No 

other complaints today.   


Reason For Visit: 


FLOFIRIFOX








Physical Exam


Vital Signs: 


                                        











Temp Pulse Resp BP Pulse Ox


 


 98.7 F   97   26 H  117/58 L  94 


 


 11/23/20 04:49  11/23/20 07:00  11/23/20 04:49  11/23/20 04:49  11/23/20 04:49








                                 Intake & Output











 11/22/20 11/23/20 11/24/20





 06:59 06:59 06:59


 


Intake Total 675 3170 


 


Output Total 1700 850 


 


Balance -1025 2320 


 


Weight 69.9 kg 58.6 kg 











General appearance: PRESENT: no acute distress, thin


Head exam: PRESENT: normocephalic


Extremities exam: ABSENT: pedal edema


Neurological exam: PRESENT: alert, awake


Psychiatric exam: PRESENT: appropriate affect


Skin exam: PRESENT: normal color





Results


Laboratory Results: 


                                        





                                 11/23/20 06:22 





                                        











  11/21/20 11/22/20 11/22/20





  06:00 06:40 13:20


 


WBC    22.8 H


 


RBC    2.91 L


 


Hgb   6.8 L  8.1 L


 


Hct    24.5 L


 


MCV    84


 


MCH    27.9


 


MCHC    33.2


 


RDW    16.6 H


 


Plt Count    106 L


 


Seg Neutrophils %   


 


Sodium   


 


Potassium   


 


Chloride   


 


Carbon Dioxide   


 


Anion Gap   


 


BUN   


 


Creatinine   


 


Est GFR (African Amer)   


 


Glucose   


 


Lactic Acid   


 


Calcium   


 


Magnesium   


 


Total Bilirubin   


 


AST   


 


Alkaline Phosphatase   


 


Ammonia   


 


Total Protein   


 


Albumin   


 


Blood Type  A POSITIVE  


 


Antibody Screen  NEGATIVE  














  11/22/20 11/22/20 11/23/20





  13:20 17:06 01:20


 


WBC   


 


RBC   


 


Hgb   


 


Hct   


 


MCV   


 


MCH   


 


MCHC   


 


RDW   


 


Plt Count   


 


Seg Neutrophils %   


 


Sodium   138.6 


 


Potassium   3.5 L 


 


Chloride   111 H 


 


Carbon Dioxide   22 


 


Anion Gap   6 


 


BUN   21 H 


 


Creatinine   0.95 


 


Est GFR ( Amer)   > 60 


 


Glucose   106 


 


Lactic Acid    1.3


 


Calcium   7.2 L 


 


Magnesium   2.1 


 


Total Bilirubin   0.8 


 


AST   29 


 


Alkaline Phosphatase   131 H 


 


Ammonia  < 8.7 L  


 


Total Protein   4.5 L 


 


Albumin   1.8 L 


 


Blood Type   


 


Antibody Screen   














  11/23/20 11/23/20





  06:22 06:22


 


WBC  Cancelled 


 


RBC  Cancelled 


 


Hgb  Cancelled 


 


Hct  Cancelled 


 


MCV  Cancelled 


 


MCH  Cancelled 


 


MCHC  Cancelled 


 


RDW  Cancelled 


 


Plt Count  Cancelled 


 


Seg Neutrophils %  Cancelled 


 


Sodium   138.8


 


Potassium   3.9


 


Chloride   113 H


 


Carbon Dioxide   20 L


 


Anion Gap   6


 


BUN   21 H


 


Creatinine   0.86


 


Est GFR (African Amer)   > 60


 


Glucose   102


 


Lactic Acid  


 


Calcium   6.8 L*


 


Magnesium  


 


Total Bilirubin   1.0


 


AST   25


 


Alkaline Phosphatase   122


 


Ammonia  


 


Total Protein   4.4 L


 


Albumin   1.8 L


 


Blood Type  


 


Antibody Screen  








                                        











  11/15/20





  22:52


 


Troponin I  < 0.012











Impressions: 


                                        





Abdomen/Pelvis CT  11/15/20 22:35


IMPRESSION:


 


1.  Stable pancreatic head mass and multiple hepatic metastases. 


Biliary drain and pneumobilia are also stable from the prior CT.


 


2.  Mild free fluid within the abdomen and pelvis, increased from


the prior study.


 


3.  Tiny bilateral pleural effusions and mild basilar atelectasis


or infiltration.


 


 


 


TECHNICAL DOCUMENTATION:


 


Quality ID # 436: Final reports with documentation of one or more


dose reduction techniques (e.g., Automated exposure control,


adjustment of the mA and/or kV according to patient size, use of


iterative reconstruction technique)


 


copyright 2011 [employer]- All Rights Reserved


 








Venous Doppler Study  11/16/20 08:07


IMPRESSION:  1.  POSITIVE EXAM. EVIDENCE OF DVT AND SVT LEFT ARM AS ABOVE.


 








Chest X-Ray  11/20/20 00:00


IMPRESSION:  Right basilar airspace disease.  Small pleural effusions.  Findings

are accentuated by low lung volumes.


 














Assessment & Plan





- Diagnosis


(1) Coagulase negative Staphylococcus bacteremia


Is this a current diagnosis for this admission?: Yes   


Plan: 


Continues antibiotics.  Most recent blood cultures remain NGTD.  However, expect

his ANC to drop over the next few days.  Watch for neutropenia.  








(2) DVT of left axillary vein, acute


Is this a current diagnosis for this admission?: Yes   


Plan: 


On Lovenox.  Will hold if PLT <50.  








(3) Pancreatic cancer metastasized to liver


Is this a current diagnosis for this admission?: Yes   


Plan: 


s/p FOLFIRINOX.  Expect blood counts to jb within the next 7 days.  No 

further treatment planned for 2 weeks.  Supportive care.  I will add zofran for 

nausea.  He is already on pepcid, PPI and carafate.  








- Time


Time Spent with patient: 15-24 minutes
Subjective


Date:: 11/24/20


Subjective:: 





Appears chronically ill.


Patient was seen and examined today.  Is tolerating fluids.  Hemoglobin is down 

to 6.8 and transfusion ordered by hematology.  Platelet count dropped to 31 and 

Dr. Cid recommends holding Lovenox.











Reason For Visit: 


FLOFIRIFOX








Physical Exam


Vital Signs: 


                                        











Temp Pulse Resp BP Pulse Ox


 


 99.2 F   89   16   105/54 L  95 


 


 11/24/20 03:15  11/24/20 07:00  11/24/20 03:15  11/24/20 03:15  11/24/20 03:15








                                 Intake & Output











 11/23/20 11/24/20 11/25/20





 06:59 06:59 06:59


 


Intake Total 3170 1696 300


 


Output Total 850 400 


 


Balance 2320 1296 300


 


Weight 129 lb 3.054 oz 127 lb 6.835 oz 











Exam: 





General appearance: PRESENT: thin


Head exam: PRESENT: atraumatic


Eye exam: PRESENT: EOMI


Ear exam: ABSENT: bleeding


Respiratory exam: PRESENT: clear to auscultation destiny


Cardiovascular exam: PRESENT: RRR, rubs, +S2


GI/Abdominal exam: PRESENT: normal bowel sounds, soft


Neurological exam: PRESENT: alert, awake, oriented to person, oriented to place,

oriented to time, oriented to situation


Psychiatric exam: PRESENT: anxious





Results


Laboratory Results: 


                                        





                                 11/24/20 05:15 





                                 11/24/20 05:15 





                                        











  11/24/20 11/24/20 11/24/20





  05:15 05:15 07:42


 


WBC  11.1 H  


 


RBC  2.44 L  


 


Hgb  6.8 L  


 


Hct  21.0 L  


 


MCV  86  


 


MCH  28.0  


 


MCHC  32.6  


 


RDW  16.8 H  


 


Plt Count  31 L  


 


Sodium   141.7 


 


Potassium   3.5 L 


 


Chloride   115 H 


 


Carbon Dioxide   22 


 


Anion Gap   5 


 


BUN   23 H 


 


Creatinine   0.87 


 


Est GFR ( Amer)   > 60 


 


Glucose   125 H 


 


Calcium   7.1 L 


 


Blood Type    A POSITIVE


 


Antibody Screen    NEGATIVE








                                        





11/18/20 15:22   Blood   Blood Culture - Final


                            NO GROWTH IN 5 DAYS


11/18/20 14:57   Blood   Blood Culture - Final


                            NO GROWTH IN 5 DAYS





                                        











  11/15/20





  22:52


 


Troponin I  < 0.012











Impressions: 


                                        





Abdomen/Pelvis CT  11/15/20 22:35


IMPRESSION:


 


1.  Stable pancreatic head mass and multiple hepatic metastases. 


Biliary drain and pneumobilia are also stable from the prior CT.


 


2.  Mild free fluid within the abdomen and pelvis, increased from


the prior study.


 


3.  Tiny bilateral pleural effusions and mild basilar atelectasis


or infiltration.


 


 


 


TECHNICAL DOCUMENTATION:


 


Quality ID # 436: Final reports with documentation of one or more


dose reduction techniques (e.g., Automated exposure control,


adjustment of the mA and/or kV according to patient size, use of


iterative reconstruction technique)


 


copyright 2011 [employer]- All Rights Reserved


 








Venous Doppler Study  11/16/20 08:07


IMPRESSION:  1.  POSITIVE EXAM. EVIDENCE OF DVT AND SVT LEFT ARM AS ABOVE.


 








Chest X-Ray  11/20/20 00:00


IMPRESSION:  Right basilar airspace disease.  Small pleural effusions.  Findings

are accentuated by low lung volumes.


 














Assessment and Plan





- Diagnosis


(1) GIUSEPPE (acute kidney injury)


Is this a current diagnosis for this admission?: Yes   


Plan: 


Resolved.  Monitor renal function








(2) Coagulase negative Staphylococcus bacteremia


Is this a current diagnosis for this admission?: Yes   


Plan: 


Patient has Staph epi in 1/2 bottles from a set and Staph Simulans in another 

bottle from another set 


While it is unclear whether this is a true bacteremia or contaminant but 

polymicrobial nature strongly suggests contaminant.


On vancomycin.








(3) DVT of left axillary vein, acute


Is this a current diagnosis for this admission?: Yes   


Plan: 


Patient is on therapeutic Lovenox.  Arm elevation.  


Hematology recommends holding Lovenox for platelet count less than 50








(4) Leukocytosis


Qualifiers: 


   Leukocytosis type: unspecified   Qualified Code(s): D72.829 - Elevated white 

blood cell count, unspecified   


Is this a current diagnosis for this admission?: Yes   


Plan: 


Etiology is questionable at this time.  Monitor CBC.  


Dr. Quintero agrees it could be due to malignancy or DVT but should cover with 

antibiotics for a couple of days just in case there is an occult infection.  He 

is on vancomycin and ceftriaxone.  


Chest x-ray on my interpretation of the images does not seem to show any right 

basilar infiltrates but does show elevated right hemidiaphragm likely due to 

hepatomegaly and some fullness in his perihilar right middle lobe region.








(5) Pain of left calf


Is this a current diagnosis for this admission?: Yes   


Plan: 


Pain control as needed.  He possibly could have a DVT this well but he is 

already on therapeutic Lovenox for the DVT in his arm.  Also could be cramping 

from which is not unusual from his chemo regimen. He does have baclofen ordered 

also for hiccups.








(6) Hypotension


Qualifiers: 


   Hypotension type: hypotension due to hypovolemia   Qualified Code(s): I95.89 

- Other hypotension; E86.1 - Hypovolemia   


Is this a current diagnosis for this admission?: Yes   


Plan: 


resolved








(7) Malnutrition


Qualifiers: 


   Malnutrition type: protein-calorie malnutrition   Protein-calorie 

malnutrition severity: moderate   Qualified Code(s): E44.0 - Moderate protein-

calorie malnutrition   


Is this a current diagnosis for this admission?: Yes   


Plan: 


Secondary to malignancy, nausea/vomiting.


Prealbumin is quite low.  Was seen by the dietitian who recommended chopped diet

which he is on.  Ensure supplements.  


Calorie count.








(8) Nausea & vomiting


Qualifiers: 


   Vomiting type: unspecified   Vomiting Intractability: intractable   Qualified

Code(s): R11.2 - Nausea with vomiting, unspecified   


Is this a current diagnosis for this admission?: Yes   


Plan: 


Antiemetics as needed.  Continue IV fluids.  Monitor electrolytes.  








(9) Pancreatic cancer metastasized to liver


Is this a current diagnosis for this admission?: Yes   


Plan: 


Oncology is following.  Patient has completed first cycle of FOLFIRINOX 11/8 

with 5FU infusion concluded 11/20. 


Continue oxycodone for patient's neoplasm related abdominal pain.  On Colace and

lubiprostone.  MiraLAX as needed.  


His physical status has declined. Patient continues to struggle with nutrition. 

He is also very fatigued and unable to do much with PT.











- Time


Anticipated Discharge Disposition: Skilled Nursing Facility


Anticipated Discharge Timeframe: na
Subjective


Date:: 11/24/20


Subjective:: 





Patient sleeping but arouses easily.  Family remains at bedside.  Family reports

that he had less nausea and no vomiting since yesterday.  Drinking fluids well, 

but not able to eat solids.  He has pain in his pelvis with coughing.  No other 

new complaints.  Had several BMs yesterday.  


Reason For Visit: 


FLOFIRIFOX








Physical Exam


Vital Signs: 


                                        











Temp Pulse Resp BP Pulse Ox


 


 99.2 F   89   16   105/54 L  95 


 


 11/24/20 03:15  11/24/20 07:00  11/24/20 03:15  11/24/20 03:15  11/24/20 03:15








                                 Intake & Output











 11/23/20 11/24/20 11/25/20





 06:59 06:59 06:59


 


Intake Total 3170 1696 


 


Output Total 850 400 


 


Balance 2320 1296 


 


Weight 58.6 kg 57.8 kg 











General appearance: PRESENT: no acute distress


Head exam: PRESENT: normocephalic


Respiratory exam: PRESENT: clear to auscultation destiny, unlabored


Cardiovascular exam: PRESENT: RRR


GI/Abdominal exam: PRESENT: normal bowel sounds, soft.  ABSENT: tenderness


Extremities exam: PRESENT: +1 edema


Neurological exam: PRESENT: alert, awake


Psychiatric exam: PRESENT: flat affect


Skin exam: PRESENT: normal color





Results


Laboratory Results: 


                                        





                                 11/24/20 05:15 





                                 11/24/20 05:15 





                                        











  11/23/20 11/24/20 11/24/20





  07:36 05:15 05:15


 


WBC  16.7 H  11.1 H 


 


RBC  2.96 L  2.44 L 


 


Hgb  8.3 L  6.8 L 


 


Hct  25.2 L  21.0 L 


 


MCV  85  86 


 


MCH  28.2  28.0 


 


MCHC  33.1  32.6 


 


RDW  16.9 H  16.8 H 


 


Plt Count  56 L  31 L 


 


Seg Neutrophils %  Not Reportable  


 


Sodium    141.7


 


Potassium    3.5 L


 


Chloride    115 H


 


Carbon Dioxide    22


 


Anion Gap    5


 


BUN    23 H


 


Creatinine    0.87


 


Est GFR ( Amer)    > 60


 


Glucose    125 H


 


Calcium    7.1 L








                                        





11/18/20 15:22   Blood   Blood Culture - Final


                            NO GROWTH IN 5 DAYS


11/18/20 14:57   Blood   Blood Culture - Final


                            NO GROWTH IN 5 DAYS





                                        











  11/15/20





  22:52


 


Troponin I  < 0.012











Impressions: 


                                        





Abdomen/Pelvis CT  11/15/20 22:35


IMPRESSION:


 


1.  Stable pancreatic head mass and multiple hepatic metastases. 


Biliary drain and pneumobilia are also stable from the prior CT.


 


2.  Mild free fluid within the abdomen and pelvis, increased from


the prior study.


 


3.  Tiny bilateral pleural effusions and mild basilar atelectasis


or infiltration.


 


 


 


TECHNICAL DOCUMENTATION:


 


Quality ID # 436: Final reports with documentation of one or more


dose reduction techniques (e.g., Automated exposure control,


adjustment of the mA and/or kV according to patient size, use of


iterative reconstruction technique)


 


copyright 2011 [employer]- All Rights Reserved


 








Venous Doppler Study  11/16/20 08:07


IMPRESSION:  1.  POSITIVE EXAM. EVIDENCE OF DVT AND SVT LEFT ARM AS ABOVE.


 








Chest X-Ray  11/20/20 00:00


IMPRESSION:  Right basilar airspace disease.  Small pleural effusions.  Findings

are accentuated by low lung volumes.


 














Assessment & Plan





- Diagnosis


(1) Coagulase negative Staphylococcus bacteremia


Is this a current diagnosis for this admission?: Yes   


Plan: 


On appropriate antibiotics.  Most recent culture NGTD.  Expect his WBC count to 

decrease over the next few days.  Watch for neutropenia.  








(2) DVT of left axillary vein, acute


Is this a current diagnosis for this admission?: Yes   


Plan: 


Continues Lovenox at therapeutic dose.  No evidence of bleeding.  Hold today for

PLT <50. 








(3) Pancreatic cancer metastasized to liver


Is this a current diagnosis for this admission?: Yes   


Plan: 


s/p chemotherapy.  Will transfuse 2 units pRBCs today.  








- Time


Time Spent with patient: 15-24 minutes
Subjective


Date:: 11/25/20


Subjective:: 





Appears chronically ill.


Patient was seen and examined today.  Still struggling with nausea and vomiting.

 Hemoglobin improved after 2 units of blood transfused yesterday.  Platelets are

low but stable at 31.  Hematology recommends holding Lovenox for that reason.  


Reason For Visit: 


FLOFIRIFOX








Physical Exam


Vital Signs: 


                                        











Temp Pulse Resp BP Pulse Ox


 


 99.2 F   102 H  20   104/61   94 


 


 11/25/20 11:57  11/25/20 11:57  11/25/20 11:57  11/25/20 11:57  11/25/20 11:57








                                 Intake & Output











 11/24/20 11/25/20 11/26/20





 06:59 06:59 06:59


 


Intake Total 1696 2850 370


 


Output Total 400  400


 


Balance 1296 2850 -30


 


Weight 127 lb 6.835 oz 130 lb 11.746 oz 130 lb 11.746 oz











Exam: 





General appearance: PRESENT: thin


Head exam: PRESENT: atraumatic


Eye exam: PRESENT: EOMI


Ear exam: ABSENT: bleeding


Respiratory exam: PRESENT: clear to auscultation destiny


Cardiovascular exam: PRESENT: RRR, rubs, +S2


GI/Abdominal exam: PRESENT: normal bowel sounds, soft


Neurological exam: PRESENT: alert, awake, oriented to person, oriented to place,

oriented to time, oriented to situation


Psychiatric exam: PRESENT: anxious





Results


Laboratory Results: 


                                        





                                 11/25/20 05:47 





                                 11/25/20 05:47 





                                        











  11/24/20 11/25/20 11/25/20





  07:42 01:05 05:47


 


WBC    7.3


 


RBC    3.12 L


 


Hgb    9.2 L D


 


Hct    27.0 L


 


MCV    87


 


MCH    29.4


 


MCHC    33.9


 


RDW    16.7 H


 


Plt Count    31 L


 


Seg Neutrophils %    Not Reportable


 


Sodium   


 


Potassium   


 


Chloride   


 


Carbon Dioxide   


 


Anion Gap   


 


BUN   


 


Creatinine   


 


Est GFR (African Amer)   


 


Glucose   


 


Calcium   


 


Urine Color   KAT 


 


Urine Appearance   TURBID 


 


Urine pH   6.0 


 


Ur Specific Gravity   1.023 


 


Urine Protein   100 H 


 


Urine Glucose (UA)   NEGATIVE 


 


Urine Ketones   NEGATIVE 


 


Urine Blood   MODERATE H 


 


Urine RBC (Auto)   6 


 


Blood Type  A POSITIVE  


 


Antibody Screen  NEGATIVE  














  11/25/20





  05:47


 


WBC 


 


RBC 


 


Hgb 


 


Hct 


 


MCV 


 


MCH 


 


MCHC 


 


RDW 


 


Plt Count 


 


Seg Neutrophils % 


 


Sodium  142.5


 


Potassium  3.9


 


Chloride  116 H


 


Carbon Dioxide  21 L


 


Anion Gap  6


 


BUN  25 H


 


Creatinine  0.96


 


Est GFR (African Amer)  > 60


 


Glucose  103


 


Calcium  7.2 L


 


Urine Color 


 


Urine Appearance 


 


Urine pH 


 


Ur Specific Gravity 


 


Urine Protein 


 


Urine Glucose (UA) 


 


Urine Ketones 


 


Urine Blood 


 


Urine RBC (Auto) 


 


Blood Type 


 


Antibody Screen 








                                        











  11/15/20





  22:52


 


Troponin I  < 0.012











Impressions: 


                                        





Abdomen/Pelvis CT  11/15/20 22:35


IMPRESSION:


 


1.  Stable pancreatic head mass and multiple hepatic metastases. 


Biliary drain and pneumobilia are also stable from the prior CT.


 


2.  Mild free fluid within the abdomen and pelvis, increased from


the prior study.


 


3.  Tiny bilateral pleural effusions and mild basilar atelectasis


or infiltration.


 


 


 


TECHNICAL DOCUMENTATION:


 


Quality ID # [id number]: Final reports with documentation of one or more


dose reduction techniques (e.g., Automated exposure control,


adjustment of the mA and/or kV according to patient size, use of


iterative reconstruction technique)


 


copyright 2011 [employer]- All Rights Reserved


 








Venous Doppler Study  11/16/20 08:07


IMPRESSION:  1.  POSITIVE EXAM. EVIDENCE OF DVT AND SVT LEFT ARM AS ABOVE.


 








Chest X-Ray  11/20/20 00:00


IMPRESSION:  Right basilar airspace disease.  Small pleural effusions.  Findings

are accentuated by low lung volumes.


 














Assessment and Plan





- Diagnosis


(1) GIUSEPPE (acute kidney injury)


Is this a current diagnosis for this admission?: Yes   





(2) Coagulase negative Staphylococcus bacteremia


Is this a current diagnosis for this admission?: Yes   





(3) DVT of left axillary vein, acute


Is this a current diagnosis for this admission?: Yes   





(4) Leukocytosis


Qualifiers: 


   Leukocytosis type: unspecified   Qualified Code(s): D72.829 - Elevated white 

blood cell count, unspecified   


Is this a current diagnosis for this admission?: Yes   





(5) Pain of left calf


Is this a current diagnosis for this admission?: Yes   





(6) Hypotension


Qualifiers: 


   Hypotension type: hypotension due to hypovolemia   Qualified Code(s): I95.89 

- Other hypotension; E86.1 - Hypovolemia   


Is this a current diagnosis for this admission?: Yes   





(7) Malnutrition


Qualifiers: 


   Malnutrition type: protein-calorie malnutrition   Protein-calorie 

malnutrition severity: moderate   Qualified Code(s): E44.0 - Moderate protein-

calorie malnutrition   


Is this a current diagnosis for this admission?: Yes   





(8) Nausea & vomiting


Qualifiers: 


   Vomiting type: unspecified   Vomiting Intractability: intractable   Qualified

Code(s): R11.2 - Nausea with vomiting, unspecified   


Is this a current diagnosis for this admission?: Yes   





(9) Pancreatic cancer metastasized to liver


Is this a current diagnosis for this admission?: Yes   





- Plan Summary


Summary: 


(1) GIUSEPPE (acute kidney injury)


Resolved.  Monitor renal function





(2) Coagulase negative Staphylococcus bacteremia


Patient has Staph epi in 1/2 bottles from a set and Staph Simulans in another 

bottle from another set 


While it is unclear whether this is a true bacteremia or contaminant but 

polymicrobial nature strongly suggests contaminant.


We we will stop antibiotics today and monitor.





(3) DVT of left axillary vein, acute


Patient is on therapeutic Lovenox.  Arm elevation.  


Hematology recommends holding Lovenox for platelet count less than 50





(4) Leukocytosis


Etiology is questionable at this time.  Monitor CBC.  


Dr. Quintero agrees it could be due to malignancy or DVT but should cover with 

antibiotics for a couple of days just in case there is an occult infection.  He 

is on vancomycin and ceftriaxone.  


Chest x-ray on my interpretation of the images does not seem to show any right 

basilar infiltrates but does show elevated right hemidiaphragm likely due to 

hepatomegaly and some fullness in his perihilar right middle lobe region.








(5) Pain of left calf


Pain control as needed.  He possibly could have a DVT this well but he is 

already on therapeutic Lovenox for the DVT in his arm.  Also could be cramping 

from which is not unusual from his chemo regimen. He does have baclofen ordered 

also for hiccups.





(6) Hypotension


resolved





(7) Malnutrition


Secondary to malignancy, nausea/vomiting.


Prealbumin is quite low.  Was seen by the dietitian who recommended chopped diet

which he is on.  Ensure supplements.  


Calorie count.





(8) Nausea & vomiting


Antiemetics as needed.  Continue IV fluids.  Monitor electrolytes.  





(9) Pancreatic cancer metastasized to liver


Oncology is following.  Patient has completed first cycle of FOLFIRINOX 11/8 

with 5FU infusion concluded 11/20. 


Continue oxycodone for patient's neoplasm related abdominal pain.  On Colace and

lubiprostone.  MiraLAX as needed.  


His physical status has declined. Patient continues to struggle with nutrition. 

He is also very fatigued and unable to do much with PT.











- Time


Anticipated Discharge Disposition: Home with Home Health


Anticipated Discharge Timeframe: within 72 hours
Subjective


Date:: 11/25/20


Subjective:: 





Patient states that he still does not feel any stronger.  Family reports that he

was able to sit up in chair for a while yesterday.  He is drinking well and 

tolerating protein drinks.  Nurses report that he is NOT eating and he had some 

hematuria yesterday.  He did receive 2 units pRBCs yesterday.  


Reason For Visit: 


FLOFIRIFOX








Physical Exam


Vital Signs: 


                                        











Temp Pulse Resp BP Pulse Ox


 


 98.1 F   93   18   115/59 L  94 


 


 11/25/20 06:34  11/25/20 06:34  11/25/20 06:34  11/25/20 06:34  11/25/20 06:34








                                 Intake & Output











 11/24/20 11/25/20 11/26/20





 06:59 06:59 06:59


 


Intake Total 1696 2850 


 


Output Total 400  


 


Balance 1296 2850 


 


Weight 57.8 kg 59.3 kg 











General appearance: PRESENT: no acute distress


Head exam: PRESENT: normocephalic


Eye exam: PRESENT: EOMI


Respiratory exam: PRESENT: unlabored


Extremities exam: PRESENT: pedal edema, +1 edema - upper extremities as well.


Neurological exam: PRESENT: alert, awake


Psychiatric exam: PRESENT: flat affect


Skin exam: PRESENT: normal color





Results


Laboratory Results: 


                                        





                                 11/25/20 05:47 





                                 11/25/20 05:47 





                                        











  11/24/20 11/24/20 11/25/20





  05:15 07:42 01:05


 


WBC   


 


RBC   


 


Hgb   


 


Hct   


 


MCV   


 


MCH   


 


MCHC   


 


RDW   


 


Plt Count   


 


Seg Neutrophils %   


 


Sodium   


 


Potassium   


 


Chloride   


 


Carbon Dioxide   


 


Anion Gap   


 


BUN   


 


Creatinine   


 


Est GFR (African Amer)   


 


Glucose   


 


Calcium   


 


Magnesium  2.2  


 


Urine Color    KAT


 


Urine Appearance    TURBID


 


Urine pH    6.0


 


Ur Specific Gravity    1.023


 


Urine Protein    100 H


 


Urine Glucose (UA)    NEGATIVE


 


Urine Ketones    NEGATIVE


 


Urine Blood    MODERATE H


 


Urine RBC (Auto)    6


 


Blood Type   A POSITIVE 


 


Antibody Screen   NEGATIVE 














  11/25/20 11/25/20





  05:47 05:47


 


WBC  7.3 


 


RBC  3.12 L 


 


Hgb  9.2 L D 


 


Hct  27.0 L 


 


MCV  87 


 


MCH  29.4 


 


MCHC  33.9 


 


RDW  16.7 H 


 


Plt Count  31 L 


 


Seg Neutrophils %  Not Reportable 


 


Sodium   142.5


 


Potassium   3.9


 


Chloride   116 H


 


Carbon Dioxide   21 L


 


Anion Gap   6


 


BUN   25 H


 


Creatinine   0.96


 


Est GFR (African Amer)   > 60


 


Glucose   103


 


Calcium   7.2 L


 


Magnesium  


 


Urine Color  


 


Urine Appearance  


 


Urine pH  


 


Ur Specific Gravity  


 


Urine Protein  


 


Urine Glucose (UA)  


 


Urine Ketones  


 


Urine Blood  


 


Urine RBC (Auto)  


 


Blood Type  


 


Antibody Screen  








                                        











  11/15/20





  22:52


 


Troponin I  < 0.012











Impressions: 


                                        





Abdomen/Pelvis CT  11/15/20 22:35


IMPRESSION:


 


1.  Stable pancreatic head mass and multiple hepatic metastases. 


Biliary drain and pneumobilia are also stable from the prior CT.


 


2.  Mild free fluid within the abdomen and pelvis, increased from


the prior study.


 


3.  Tiny bilateral pleural effusions and mild basilar atelectasis


or infiltration.


 


 


 


TECHNICAL DOCUMENTATION:


 


Quality ID # 436: Final reports with documentation of one or more


dose reduction techniques (e.g., Automated exposure control,


adjustment of the mA and/or kV according to patient size, use of


iterative reconstruction technique)


 


copyright 2011 [employer]- All Rights Reserved


 








Venous Doppler Study  11/16/20 08:07


IMPRESSION:  1.  POSITIVE EXAM. EVIDENCE OF DVT AND SVT LEFT ARM AS ABOVE.


 








Chest X-Ray  11/20/20 00:00


IMPRESSION:  Right basilar airspace disease.  Small pleural effusions.  Findings

are accentuated by low lung volumes.


 














Assessment & Plan





- Diagnosis


(1) Coagulase negative Staphylococcus bacteremia


Is this a current diagnosis for this admission?: Yes   


Plan: 


Last negative culture was 11/16/20.  His ANC remains normal.  Further ant

ibiotics per primary team, but would like to minimize ABX exposure if possible. 










(2) DVT of left axillary vein, acute


Is this a current diagnosis for this admission?: Yes   


Plan: 


Continues Lovenox.  However, this is on hold for PLT <50.  Watch for further 

hematuria or other bleeding.  HGB increased after pRBCs yesterday.  








(3) Pancreatic cancer metastasized to liver


Is this a current diagnosis for this admission?: Yes   


Plan: 


s/0 chemo.  Blood counts dropping as expected.  Some mucositis.  OK to use Magic

Mouth Wash PRN for this.  








- Time


Time Spent with patient: 15-24 minutes
Subjective


Date:: 11/26/20


Subjective:: 





Appears chronically ill.


Patient was seen and examined today.  Still struggling with nausea and vomiting.

 Platelet count improved today.  Hematology recommends holding Lovenox for that 

reason.  Wife is at bedside.


Reason For Visit: 


FLOFIRIFOX








Physical Exam


Vital Signs: 


                                        











Temp Pulse Resp BP Pulse Ox


 


 97.6 F   98   36 H  104/60   100 


 


 11/26/20 08:00  11/26/20 08:00  11/26/20 08:00  11/26/20 08:00  11/26/20 08:00








                                 Intake & Output











 11/25/20 11/26/20 11/27/20





 06:59 06:59 06:59


 


Intake Total 2850 1370 


 


Output Total  700 


 


Balance 2850 670 


 


Weight 130 lb 11.746 oz 130 lb 11.746 oz 











Exam: 





General appearance: PRESENT: thin


Head exam: PRESENT: atraumatic


Eye exam: PRESENT: EOMI


Ear exam: ABSENT: bleeding


Respiratory exam: PRESENT: clear to auscultation destiny


Cardiovascular exam: PRESENT: RRR, rubs, +S2


GI/Abdominal exam: PRESENT: normal bowel sounds, soft


Neurological exam: PRESENT: alert, awake, oriented to person, oriented to place,

oriented to time, oriented to situation


Psychiatric exam: PRESENT: anxious





Results


Laboratory Results: 


                                        





                                 11/26/20 05:28 





                                 11/26/20 05:28 





                                        











  11/26/20 11/26/20





  05:28 05:28


 


WBC  3.3 L 


 


RBC  2.86 L 


 


Hgb  8.4 L 


 


Hct  25.0 L 


 


MCV  87 


 


MCH  29.3 


 


MCHC  33.6 


 


RDW  17.1 H 


 


Plt Count  41 L 


 


Sodium   142.1


 


Potassium   3.9


 


Chloride   116 H


 


Carbon Dioxide   22


 


Anion Gap   4 L


 


BUN   28 H


 


Creatinine   1.00


 


Est GFR (African Amer)   > 60


 


Glucose   106


 


Calcium   7.1 L








                                        











  11/15/20





  22:52


 


Troponin I  < 0.012











Impressions: 


                                        





Abdomen/Pelvis CT  11/15/20 22:35


IMPRESSION:


 


1.  Stable pancreatic head mass and multiple hepatic metastases. 


Biliary drain and pneumobilia are also stable from the prior CT.


 


2.  Mild free fluid within the abdomen and pelvis, increased from


the prior study.


 


3.  Tiny bilateral pleural effusions and mild basilar atelectasis


or infiltration.


 


 


 


TECHNICAL DOCUMENTATION:


 


Quality ID # 436: Final reports with documentation of one or more


dose reduction techniques (e.g., Automated exposure control,


adjustment of the mA and/or kV according to patient size, use of


iterative reconstruction technique)


 


copyright 2011 [employer]- All Rights Reserved


 








Venous Doppler Study  11/16/20 08:07


IMPRESSION:  1.  POSITIVE EXAM. EVIDENCE OF DVT AND SVT LEFT ARM AS ABOVE.


 








Chest X-Ray  11/20/20 00:00


IMPRESSION:  Right basilar airspace disease.  Small pleural effusions.  Findings

are accentuated by low lung volumes.


 














Assessment and Plan





- Diagnosis


(1) GIUSEPPE (acute kidney injury)


Is this a current diagnosis for this admission?: Yes   





(2) Coagulase negative Staphylococcus bacteremia


Is this a current diagnosis for this admission?: Yes   





(3) DVT of left axillary vein, acute


Is this a current diagnosis for this admission?: Yes   





(4) Leukocytosis


Qualifiers: 


   Leukocytosis type: unspecified   Qualified Code(s): D72.829 - Elevated white 

blood cell count, unspecified   


Is this a current diagnosis for this admission?: Yes   





(5) Pain of left calf


Is this a current diagnosis for this admission?: Yes   





(6) Hypotension


Qualifiers: 


   Hypotension type: hypotension due to hypovolemia   Qualified Code(s): I95.89 

- Other hypotension; E86.1 - Hypovolemia   


Is this a current diagnosis for this admission?: Yes   





(7) Malnutrition


Qualifiers: 


   Malnutrition type: protein-calorie malnutrition   Protein-calorie ma

lnutrition severity: moderate   Qualified Code(s): E44.0 - Moderate protein-

calorie malnutrition   


Is this a current diagnosis for this admission?: Yes   





(8) Nausea & vomiting


Qualifiers: 


   Vomiting type: unspecified   Vomiting Intractability: intractable   Qualified

Code(s): R11.2 - Nausea with vomiting, unspecified   


Is this a current diagnosis for this admission?: Yes   





(9) Pancreatic cancer metastasized to liver


Is this a current diagnosis for this admission?: Yes   





- Plan Summary


Summary: 


(1) GIUSEPPE (acute kidney injury)


Resolved.  Monitor renal function





(2) Coagulase negative Staphylococcus bacteremia


Patient has Staph epi in 1/2 bottles from a set and Staph Simulans in another 

bottle from another set 


While it is unclear whether this is a true bacteremia or contaminant but 

polymicrobial nature strongly suggests contaminant.


We stopped antibiotics 11/25/2020.  Continue to monitor off antibiotic.





(3) DVT of left axillary vein, acute


Patient is on therapeutic Lovenox.  Arm elevation.  


Hematology recommends holding Lovenox for platelet count less than 50





(4) Leukocytosis


Etiology is questionable at this time.  Monitor CBC.  


Dr. Quintero agrees it could be due to malignancy or DVT but should cover with 

antibiotics for a couple of days just in case there is an occult infection.  He 

is on vancomycin and ceftriaxone.  


Chest x-ray on my interpretation of the images does not seem to show any right 

basilar infiltrates but does show elevated right hemidiaphragm likely due to 

hepatomegaly and some fullness in his perihilar right middle lobe region.


Currently white count is low.





5) Pain of left calf


Pain control as needed.  He possibly could have a DVT this well but he is 

already on therapeutic Lovenox for the DVT in his arm.  Also could be cramping 

from which is not unusual from his chemo regimen. He does have baclofen ordered 

also for hiccups.





(6) Hypotension


resolved





(7) Malnutrition


Secondary to malignancy, nausea/vomiting.


Prealbumin is quite low.  Was seen by the dietitian who recommended chopped diet

which he is on.  Ensure supplements.  


Calorie count.





(8) Nausea & vomiting


Antiemetics as needed.  Continue IV fluids.  Monitor electrolytes.  





(9) Pancreatic cancer metastasized to liver


Oncology is following.  Patient has completed first cycle of FOLFIRINOX 11/8 

with 5FU infusion concluded 11/20. 


Continue oxycodone for patient's neoplasm related abdominal pain.  On Colace and

lubiprostone.  MiraLAX as needed.  


His physical status has declined. Patient continues to struggle with nutrition. 

He is also very fatigued and unable to do much with PT.











- Time


Anticipated Discharge Disposition: Home, Self Care


Anticipated Discharge Timeframe: within 72 hours
Subjective


Date:: 11/26/20


Subjective:: 





Patient is sleeping peacefully and does not awaken to speak.  He does withdraw t

o touch.  His family members are concerned about his restlessness last night.  

He has not eaten anything for at least 5 days.  Family states he is drinking 

ensure, but nursing staff state that he has only had 1/2 of a small ensure 

today.  He is still vomiting and staff is concerned about aspiration at this 

point.  Family states that he has stopped his xanax and this is why he may have 

increased agitation.  ABG was just drawn.  He continues to have severe pain in 

his right leg.  


Reason For Visit: 


FLOFIRIFOX








Physical Exam


Vital Signs: 


                                        











Temp Pulse Resp BP Pulse Ox


 


 99.8 F   102 H  30 H  104/54 L  99 


 


 11/26/20 11:38  11/26/20 11:38  11/26/20 11:38  11/26/20 11:38  11/26/20 11:38








                                 Intake & Output











 11/25/20 11/26/20 11/27/20





 06:59 06:59 06:59


 


Intake Total 2850 1370 


 


Output Total  700 


 


Balance 2850 670 


 


Weight 59.3 kg 59.3 kg 











General appearance: PRESENT: no acute distress


Head exam: PRESENT: normocephalic


Respiratory exam: PRESENT: clear to auscultation destiny, unlabored


Cardiovascular exam: PRESENT: RRR


GI/Abdominal exam: PRESENT: hypoactive bowel sounds, soft


Extremities exam: PRESENT: other - edema in all 4 extremities.


Skin exam: PRESENT: normal color





Results


Laboratory Results: 


                                        





                                 11/26/20 05:28 





                                 11/26/20 05:28 





                                        











  11/26/20 11/26/20





  05:28 05:28


 


WBC  3.3 L 


 


RBC  2.86 L 


 


Hgb  8.4 L 


 


Hct  25.0 L 


 


MCV  87 


 


MCH  29.3 


 


MCHC  33.6 


 


RDW  17.1 H 


 


Plt Count  41 L 


 


Sodium   142.1


 


Potassium   3.9


 


Chloride   116 H


 


Carbon Dioxide   22


 


Anion Gap   4 L


 


BUN   28 H


 


Creatinine   1.00


 


Est GFR (African Amer)   > 60


 


Glucose   106


 


Calcium   7.1 L








                                        











  11/15/20





  22:52


 


Troponin I  < 0.012











Impressions: 


                                        





Abdomen/Pelvis CT  11/15/20 22:35


IMPRESSION:


 


1.  Stable pancreatic head mass and multiple hepatic metastases. 


Biliary drain and pneumobilia are also stable from the prior CT.


 


2.  Mild free fluid within the abdomen and pelvis, increased from


the prior study.


 


3.  Tiny bilateral pleural effusions and mild basilar atelectasis


or infiltration.


 


 


 


TECHNICAL DOCUMENTATION:


 


Quality ID # 436: Final reports with documentation of one or more


dose reduction techniques (e.g., Automated exposure control,


adjustment of the mA and/or kV according to patient size, use of


iterative reconstruction technique)


 


copyright 2011 [employer]- All Rights Reserved


 








Venous Doppler Study  11/16/20 08:07


IMPRESSION:  1.  POSITIVE EXAM. EVIDENCE OF DVT AND SVT LEFT ARM AS ABOVE.


 








Chest X-Ray  11/20/20 00:00


IMPRESSION:  Right basilar airspace disease.  Small pleural effusions.  Findings

are accentuated by low lung volumes.


 














Assessment & Plan





- Diagnosis


(1) Coagulase negative Staphylococcus bacteremia


Is this a current diagnosis for this admission?: Yes   


Plan: 


He has finished antibiotics.  Afebrile for 48 hours.  ANC remains >1. 








(2) DVT of left axillary vein, acute


Is this a current diagnosis for this admission?: Yes   


Plan: 


Lovenox on hold due to thrombocytopenia.   May restart tomorrow if PLT are >50. 










(3) Pancreatic cancer metastasized to liver


Is this a current diagnosis for this admission?: Yes   


Plan: 


s/p chemotherapy.  Blood counts dropping.  No indication for transfusion 

currently.  








(4) Malnutrition


Qualifiers: 


   Malnutrition type: protein-calorie malnutrition   Protein-calorie 

malnutrition severity: moderate   Qualified Code(s): E44.0 - Moderate protein-

calorie malnutrition   


Is this a current diagnosis for this admission?: Yes   


Plan: 


I will consult nutrition services for possible TPN.  Family does not wish to 

have PEG tube placed, but this may be necessary.  Currently, he is vomiting so I

am not sure tube feedings would be tolerated anyway.  Swallowing evaluation has 

been requested.  I have added Reglan IV RTC.  I will try to change as many meds 

from PO to IV as possible.  








- Time


Time Spent with patient: 15-24 minutes





- Plan Summary


Plan Summary: 





Patient remains a full code.  This was verified yesterday but the patient and 

his mother.
Subjective


Date:: 11/27/20


Subjective:: 





Appears chronically ill.


Patient was seen and examined today.  Still struggling with nausea and vomiting.

 Platelet count improved to 77.  Mother is at bedside.


Reason For Visit: 


FLOFIRIFOX








Physical Exam


Vital Signs: 


                                        











Temp Pulse Resp BP Pulse Ox


 


 98.1 F   95   22 H  126/60 H  100 


 


 11/27/20 03:43  11/27/20 03:43  11/27/20 03:43  11/27/20 03:43  11/27/20 05:44








                                 Intake & Output











 11/26/20 11/27/20 11/28/20





 06:59 06:59 06:59


 


Intake Total 1370 1189 


 


Output Total 700 1000 


 


Balance 670 189 


 


Weight 130 lb 11.746 oz 132 lb 11.492 oz 132 lb 11.492 oz











Exam: 





General appearance: PRESENT: thin


Head exam: PRESENT: atraumatic


Eye exam: PRESENT: EOMI


Ear exam: ABSENT: bleeding


Respiratory exam: PRESENT: clear to auscultation destiny


Cardiovascular exam: PRESENT: RRR, rubs, +S2


GI/Abdominal exam: PRESENT: normal bowel sounds, soft


Neurological exam: PRESENT: alert, awake, oriented to person, oriented to place,

oriented to time, oriented to situation


Psychiatric exam: PRESENT: anxious





Results


Laboratory Results: 


                                        





                                 11/27/20 08:05 





                                 11/27/20 08:05 





                                        











  11/26/20 11/27/20 11/27/20





  12:40 08:05 08:05


 


WBC   1.6 L D 


 


RBC   2.90 L 


 


Hgb   8.2 L 


 


Hct   25.4 L 


 


MCV   88 


 


MCH   28.3 


 


MCHC   32.3 


 


RDW   17.8 H 


 


Plt Count   77 L 


 


Carbonic Acid  1.08  


 


HCO3/H2CO3 Ratio  18:1  


 


ABG pH  7.35  


 


ABG pCO2  35.9  


 


ABG pO2  86.7  


 


ABG HCO3  19.5 L  


 


ABG O2 Saturation  96.3  


 


ABG Base Excess  -5.4  


 


FiO2  3L  


 


Sodium    141.2


 


Potassium    3.9


 


Chloride    116 H


 


Carbon Dioxide    20 L


 


Anion Gap    5


 


BUN    39 H


 


Creatinine    1.10


 


Est GFR ( Amer)    > 60


 


Glucose    117 H


 


Calcium    7.3 L








                                        











  11/15/20





  22:52


 


Troponin I  < 0.012











Impressions: 


                                        





Abdomen/Pelvis CT  11/15/20 22:35


IMPRESSION:


 


1.  Stable pancreatic head mass and multiple hepatic metastases. 


Biliary drain and pneumobilia are also stable from the prior CT.


 


2.  Mild free fluid within the abdomen and pelvis, increased from


the prior study.


 


3.  Tiny bilateral pleural effusions and mild basilar atelectasis


or infiltration.


 


 


 


TECHNICAL DOCUMENTATION:


 


Quality ID # 436: Final reports with documentation of one or more


dose reduction techniques (e.g., Automated exposure control,


adjustment of the mA and/or kV according to patient size, use of


iterative reconstruction technique)


 


copyright 2011 [employer]- All Rights Reserved


 








Venous Doppler Study  11/16/20 08:07


IMPRESSION:  1.  POSITIVE EXAM. EVIDENCE OF DVT AND SVT LEFT ARM AS ABOVE.


 








Chest X-Ray  11/26/20 12:13


IMPRESSION:  Small bilateral pleural effusions in the posterior costophrenic 

sulci


Bibasilar airspace disease atelectasis versus pneumonia right greater than left


 














Assessment and Plan





- Diagnosis


(1) GIUSEPPE (acute kidney injury)


Is this a current diagnosis for this admission?: Yes   





(2) Coagulase negative Staphylococcus bacteremia


Is this a current diagnosis for this admission?: Yes   





(3) DVT of left axillary vein, acute


Is this a current diagnosis for this admission?: Yes   





(4) Leukocytosis


Qualifiers: 


   Leukocytosis type: unspecified   Qualified Code(s): D72.829 - Elevated white 

blood cell count, unspecified   


Is this a current diagnosis for this admission?: Yes   





(5) Pain of left calf


Is this a current diagnosis for this admission?: Yes   





(6) Hypotension


Qualifiers: 


   Hypotension type: hypotension due to hypovolemia   Qualified Code(s): I95.89 

- Other hypotension; E86.1 - Hypovolemia   


Is this a current diagnosis for this admission?: Yes   





(7) Malnutrition


Qualifiers: 


   Malnutrition type: protein-calorie malnutrition   Protein-calorie 

malnutrition severity: moderate   Qualified Code(s): E44.0 - Moderate protein-

calorie malnutrition   


Is this a current diagnosis for this admission?: Yes   





(8) Nausea & vomiting


Qualifiers: 


   Vomiting type: unspecified   Vomiting Intractability: intractable   Qualified

Code(s): R11.2 - Nausea with vomiting, unspecified   


Is this a current diagnosis for this admission?: Yes   





(9) Pancreatic cancer metastasized to liver


Is this a current diagnosis for this admission?: Yes   





- Plan Summary


Summary: 


(1) GIUSEPPE (acute kidney injury)


Resolved.  Monitor renal function





(2) Coagulase negative Staphylococcus bacteremia


Patient has Staph epi in 1/2 bottles from a set and Staph Simulans in another 

bottle from another set 


While it is unclear whether this is a true bacteremia or contaminant but 

polymicrobial nature strongly suggests contaminant.


We stopped antibiotics 11/25/2020.  Continue to monitor off antibiotic.





(3) DVT of left axillary vein, acute


Patient is on therapeutic Lovenox.  Arm elevation.  


Hematology recommends holding Lovenox for platelet count less than 50





(4) Leukocytosis


Etiology is questionable at this time.  Monitor CBC.  


Dr. Quintero agrees it could be due to malignancy or DVT but should cover with 

antibiotics for a couple of days just in case there is an occult infection.  He 

is on vancomycin and ceftriaxone.  


Chest x-ray on my interpretation of the images does not seem to show any right 

basilar infiltrates but does show elevated right hemidiaphragm likely due to 

hepatomegaly and some fullness in his perihilar right middle lobe region.


Currently white count is low.





5) Pain of left calf


Pain control as needed.  He possibly could have a DVT this well but he is 

already on therapeutic Lovenox for the DVT in his arm.  Also could be cramping 

from which is not unusual from his chemo regimen. He does have baclofen ordered 

also for hiccups.





(6) Hypotension


resolved





(7) Malnutrition


Secondary to malignancy, nausea/vomiting.


Prealbumin is quite low.  Was seen by the dietitian who recommended chopped diet

which he is on.  Ensure supplements.  


Calorie count.


Discussed tube feed versus TPN with the patient and his mother.  They want to 

hold off for now.





(8) Nausea & vomiting


Antiemetics as needed.  Continue IV fluids.  Monitor electrolytes.  





(9) Pancreatic cancer metastasized to liver


Oncology is following.  Patient has completed first cycle of FOLFIRINOX 11/8 

with 5FU infusion concluded 11/20. 


Continue oxycodone for patient's neoplasm related abdominal pain.  On Colace and

lubiprostone.  MiraLAX as needed.  


His physical status has declined. Patient continues to struggle with nutrition. 

He is also very fatigued and unable to do much with PT.











- Time


Anticipated Discharge Disposition: Home, Self Care


Anticipated Discharge Timeframe: within 72 hours
Subjective


Date:: 11/28/20


Subjective:: 





Appears chronically ill.


Patient was seen and examined today.  Still struggling with nausea and vomiting.

 Platelet count improved to 96.  White blood cells down to 0.8.  Mother is at 

bedside.


Reason For Visit: 


FLOFIRIFOX








Physical Exam


Vital Signs: 


                                        











Temp Pulse Resp BP Pulse Ox


 


 98.9 F   118 H  20   103/54 L  100 


 


 11/28/20 11:36  11/28/20 11:36  11/28/20 11:36  11/28/20 11:36  11/28/20 11:36








                                 Intake & Output











 11/27/20 11/28/20 11/29/20





 06:59 06:59 06:59


 


Intake Total 1189 1071 


 


Output Total 1000 600 


 


Balance 189 471 


 


Weight 132 lb 11.492 oz 132 lb 11.492 oz 











Exam: 





General appearance: PRESENT: thin


Head exam: PRESENT: atraumatic


Eye exam: PRESENT: EOMI


Ear exam: ABSENT: bleeding


Respiratory exam: PRESENT: clear to auscultation destiny


Cardiovascular exam: PRESENT: RRR, rubs, +S2


GI/Abdominal exam: PRESENT: normal bowel sounds, soft


Neurological exam: PRESENT: alert, awake, oriented to person, oriented to place,

oriented to time, oriented to situation


Psychiatric exam: PRESENT: anxious








Results


Laboratory Results: 


                                        





                                 11/28/20 06:39 





                                 11/28/20 09:25 





                                        











  11/27/20 11/27/20 11/27/20





  15:49 15:49 16:35


 


WBC    Cancelled


 


RBC    Cancelled


 


Hgb    Cancelled


 


Hct    Cancelled


 


MCV    Cancelled


 


MCH    Cancelled


 


MCHC    Cancelled


 


RDW    Cancelled


 


Plt Count    Cancelled


 


Seg Neutrophils %   


 


Sodium   


 


Potassium  3.9  


 


Chloride   


 


Carbon Dioxide   


 


Anion Gap   


 


BUN   


 


Creatinine   


 


Est GFR (African Amer)   


 


Glucose   


 


Calcium   


 


Phosphorus  2.6  


 


Magnesium  2.1  


 


Total Bilirubin   1.4 H 


 


AST   17 


 


Alkaline Phosphatase   99 


 


Total Protein   3.8 L 


 


Albumin   1.7 L 


 


Prealbumin   


 


Triglycerides  114  














  11/27/20 11/28/20 11/28/20





  17:43 06:39 06:39


 


WBC  1.1 L*  0.8 L* 


 


RBC  2.85 L  2.64 L 


 


Hgb  8.1 L  7.5 L 


 


Hct  24.8 L  23.3 L 


 


MCV  87  89 


 


MCH  28.2  28.6 


 


MCHC  32.5  32.3 


 


RDW  17.8 H  17.7 H 


 


Plt Count  83 L  96 L 


 


Seg Neutrophils %   30.7 L 


 


Sodium    144.3


 


Potassium    3.9


 


Chloride    119 H


 


Carbon Dioxide    21 L


 


Anion Gap    4 L


 


BUN    39 H


 


Creatinine    1.10


 


Est GFR ( Amer)    > 60


 


Glucose    97


 


Calcium    7.3 L


 


Phosphorus   


 


Magnesium   


 


Total Bilirubin   


 


AST   


 


Alkaline Phosphatase   


 


Total Protein   


 


Albumin   


 


Prealbumin   


 


Triglycerides   














  11/28/20





  09:25


 


WBC 


 


RBC 


 


Hgb 


 


Hct 


 


MCV 


 


MCH 


 


MCHC 


 


RDW 


 


Plt Count 


 


Seg Neutrophils % 


 


Sodium  144.2


 


Potassium  3.9


 


Chloride  119 H


 


Carbon Dioxide  22


 


Anion Gap  3 L


 


BUN  39 H


 


Creatinine  1.12


 


Est GFR (African Amer)  > 60


 


Glucose  95


 


Calcium  7.5 L


 


Phosphorus  2.5


 


Magnesium 


 


Total Bilirubin  1.3


 


AST  17


 


Alkaline Phosphatase  106


 


Total Protein  3.9 L


 


Albumin  1.6 L


 


Prealbumin  < 3.0 L


 


Triglycerides 








                                        





11/23/20 02:41   Blood   Blood Culture - Final


                            NO GROWTH IN 5 DAYS


11/23/20 01:20   Blood   Blood Culture - Final


                            NO GROWTH IN 5 DAYS





                                        











  11/15/20





  22:52


 


Troponin I  < 0.012











Impressions: 


                                        





Abdomen/Pelvis CT  11/15/20 22:35


IMPRESSION:


 


1.  Stable pancreatic head mass and multiple hepatic metastases. 


Biliary drain and pneumobilia are also stable from the prior CT.


 


2.  Mild free fluid within the abdomen and pelvis, increased from


the prior study.


 


3.  Tiny bilateral pleural effusions and mild basilar atelectasis


or infiltration.


 


 


 


TECHNICAL DOCUMENTATION:


 


Quality ID # [id number]: Final reports with documentation of one or more


dose reduction techniques (e.g., Automated exposure control,


adjustment of the mA and/or kV according to patient size, use of


iterative reconstruction technique)


 


copyright 2011 [employer]- All Rights Reserved


 








Venous Doppler Study  11/16/20 08:07


IMPRESSION:  1.  POSITIVE EXAM. EVIDENCE OF DVT AND SVT LEFT ARM AS ABOVE.


 








Chest X-Ray  11/26/20 12:13


IMPRESSION:  Small bilateral pleural effusions in the posterior costophrenic 

sulci


Bibasilar airspace disease atelectasis versus pneumonia right greater than left


 














Assessment and Plan





- Diagnosis


(1) GIUSEPPE (acute kidney injury)


Is this a current diagnosis for this admission?: Yes   





(2) Coagulase negative Staphylococcus bacteremia


Is this a current diagnosis for this admission?: Yes   





(3) DVT of left axillary vein, acute


Is this a current diagnosis for this admission?: Yes   





(4) Leukocytosis


Qualifiers: 


   Leukocytosis type: unspecified   Qualified Code(s): D72.829 - Elevated white 

blood cell count, unspecified   


Is this a current diagnosis for this admission?: Yes   





(5) Pain of left calf


Is this a current diagnosis for this admission?: Yes   





(6) Hypotension


Qualifiers: 


   Hypotension type: hypotension due to hypovolemia   Qualified Code(s): I95.89 

- Other hypotension; E86.1 - Hypovolemia   


Is this a current diagnosis for this admission?: Yes   





(7) Malnutrition


Qualifiers: 


   Malnutrition type: protein-calorie malnutrition   Protein-calorie 

malnutrition severity: moderate   Qualified Code(s): E44.0 - Moderate protein-

calorie malnutrition   


Is this a current diagnosis for this admission?: Yes   





(8) Nausea & vomiting


Qualifiers: 


   Vomiting type: unspecified   Vomiting Intractability: intractable   Qualified

Code(s): R11.2 - Nausea with vomiting, unspecified   


Is this a current diagnosis for this admission?: Yes   





(9) Pancreatic cancer metastasized to liver


Is this a current diagnosis for this admission?: Yes   





- Plan Summary


Summary: 


(1) GIUSEPPE (acute kidney injury)


Resolved.  Monitor renal function





(2) Coagulase negative Staphylococcus bacteremia


Patient has Staph epi in 1/2 bottles from a set and Staph Simulans in another 

bottle from another set 


While it is unclear whether this is a true bacteremia or contaminant but 

polymicrobial nature strongly suggests contaminant.


We stopped antibiotics 11/25/2020.  Continue to monitor off antibiotic.





(3) DVT of left axillary vein, acute


Patient is on therapeutic Lovenox.  Arm elevation.  


Hematology recommends holding Lovenox for platelet count less than 50





(4) Leukocytosis


Etiology is questionable at this time.  Monitor CBC.  


Dr. Quintero agrees it could be due to malignancy or DVT but should cover with 

antibiotics for a couple of days just in case there is an occult infection.  He 

is on vancomycin and ceftriaxone.  


Chest x-ray on my interpretation of the images does not seem to show any right 

basilar infiltrates but does show elevated right hemidiaphragm likely due to 

hepatomegaly and some fullness in his perihilar right middle lobe region.


Currently patient is neutropenic.  He is afebrile so far.  Reverse contact 

precautions.





5) Pain of left calf


Pain control as needed.  He possibly could have a DVT this well but he is alr

ellen on therapeutic Lovenox for the DVT in his arm.  Also could be cramping from

which is not unusual from his chemo regimen. He does have baclofen ordered also 

for hiccups.





(6) Hypotension


resolved





(7) Malnutrition


Secondary to malignancy, nausea/vomiting.


Prealbumin is quite low.  Was seen by the dietitian who recommended chopped diet

which he is on.  Ensure supplements.  


Calorie count.


Discussed tube feed versus TPN with the patient and his mother.  They agreed to 

TPN today.





(8) Nausea & vomiting


Antiemetics as needed.  Continue IV fluids.  Monitor electrolytes.  





(9) Pancreatic cancer metastasized to liver


Oncology is following.  Patient has completed first cycle of FOLFIRINOX 11/8 

with 5FU infusion concluded 11/20. 


Continue oxycodone for patient's neoplasm related abdominal pain.  On Colace and

lubiprostone.  MiraLAX as needed.  


His physical status has declined. Patient continues to struggle with nutrition. 

He is also very fatigued and unable to do much with PT.











- Time


Anticipated Discharge Disposition: Home, Self Care


Anticipated Discharge Timeframe: within 72 hours
Subjective


Date:: 11/28/20


Subjective:: 





Patient still not feeling well.  aspiration and nausea.  Swallowing study showed

possible aspiration . He is still not able to eat enough to sustain himself.  

TPN has been ordered.  I discussed PEG tube with patient and family.  They will 

consider this.  He is still having some BMs.  Family wants to get him up to 

chair.  He is still having swelling in his legs, as well as pain.  


Reason For Visit: 


FLOFIRIFOX








Physical Exam


Vital Signs: 


                                        











Temp Pulse Resp BP Pulse Ox


 


 97.8 F   110 H  18   99/79 L  100 


 


 11/28/20 08:26  11/28/20 08:26  11/28/20 08:26  11/28/20 08:26  11/28/20 08:26








                                 Intake & Output











 11/27/20 11/28/20 11/29/20





 06:59 06:59 06:59


 


Intake Total 1189 1071 


 


Output Total 1000 600 


 


Balance 189 471 


 


Weight 60.2 kg 60.2 kg 











General appearance: PRESENT: no acute distress


Head exam: PRESENT: normocephalic


Eye exam: PRESENT: EOMI


Respiratory exam: PRESENT: unlabored


Extremities exam: PRESENT: +1 edema


Neurological exam: PRESENT: awake


Psychiatric exam: PRESENT: flat affect


Skin exam: PRESENT: normal color





Results


Laboratory Results: 


                                        





                                 11/28/20 06:39 





                                 11/28/20 09:25 





                                        











  11/27/20 11/27/20 11/27/20





  15:49 15:49 16:35


 


WBC    Cancelled


 


RBC    Cancelled


 


Hgb    Cancelled


 


Hct    Cancelled


 


MCV    Cancelled


 


MCH    Cancelled


 


MCHC    Cancelled


 


RDW    Cancelled


 


Plt Count    Cancelled


 


Seg Neutrophils %   


 


Sodium   


 


Potassium  3.9  


 


Chloride   


 


Carbon Dioxide   


 


Anion Gap   


 


BUN   


 


Creatinine   


 


Est GFR (African Amer)   


 


Glucose   


 


Calcium   


 


Phosphorus  2.6  


 


Magnesium  2.1  


 


Total Bilirubin   1.4 H 


 


AST   17 


 


Alkaline Phosphatase   99 


 


Total Protein   3.8 L 


 


Albumin   1.7 L 


 


Prealbumin   


 


Triglycerides  114  














  11/27/20 11/28/20 11/28/20





  17:43 06:39 06:39


 


WBC  1.1 L*  0.8 L* 


 


RBC  2.85 L  2.64 L 


 


Hgb  8.1 L  7.5 L 


 


Hct  24.8 L  23.3 L 


 


MCV  87  89 


 


MCH  28.2  28.6 


 


MCHC  32.5  32.3 


 


RDW  17.8 H  17.7 H 


 


Plt Count  83 L  96 L 


 


Seg Neutrophils %   30.7 L 


 


Sodium    144.3


 


Potassium    3.9


 


Chloride    119 H


 


Carbon Dioxide    21 L


 


Anion Gap    4 L


 


BUN    39 H


 


Creatinine    1.10


 


Est GFR ( Amer)    > 60


 


Glucose    97


 


Calcium    7.3 L


 


Phosphorus   


 


Magnesium   


 


Total Bilirubin   


 


AST   


 


Alkaline Phosphatase   


 


Total Protein   


 


Albumin   


 


Prealbumin   


 


Triglycerides   














  11/28/20





  09:25


 


WBC 


 


RBC 


 


Hgb 


 


Hct 


 


MCV 


 


MCH 


 


MCHC 


 


RDW 


 


Plt Count 


 


Seg Neutrophils % 


 


Sodium  144.2


 


Potassium  3.9


 


Chloride  119 H


 


Carbon Dioxide  22


 


Anion Gap  3 L


 


BUN  39 H


 


Creatinine  1.12


 


Est GFR (African Amer)  > 60


 


Glucose  95


 


Calcium  7.5 L


 


Phosphorus  2.5


 


Magnesium 


 


Total Bilirubin  1.3


 


AST  17


 


Alkaline Phosphatase  106


 


Total Protein  3.9 L


 


Albumin  1.6 L


 


Prealbumin  < 3.0 L


 


Triglycerides 








                                        





11/23/20 02:41   Blood   Blood Culture - Final


                            NO GROWTH IN 5 DAYS


11/23/20 01:20   Blood   Blood Culture - Final


                            NO GROWTH IN 5 DAYS





                                        











  11/15/20





  22:52


 


Troponin I  < 0.012











Impressions: 


                                        





Abdomen/Pelvis CT  11/15/20 22:35


IMPRESSION:


 


1.  Stable pancreatic head mass and multiple hepatic metastases. 


Biliary drain and pneumobilia are also stable from the prior CT.


 


2.  Mild free fluid within the abdomen and pelvis, increased from


the prior study.


 


3.  Tiny bilateral pleural effusions and mild basilar atelectasis


or infiltration.


 


 


 


TECHNICAL DOCUMENTATION:


 


Quality ID # 436: Final reports with documentation of one or more


dose reduction techniques (e.g., Automated exposure control,


adjustment of the mA and/or kV according to patient size, use of


iterative reconstruction technique)


 


copyright 2011 [employer]- All Rights Reserved


 








Venous Doppler Study  11/16/20 08:07


IMPRESSION:  1.  POSITIVE EXAM. EVIDENCE OF DVT AND SVT LEFT ARM AS ABOVE.


 








Chest X-Ray  11/26/20 12:13


IMPRESSION:  Small bilateral pleural effusions in the posterior costophrenic 

sulci


Bibasilar airspace disease atelectasis versus pneumonia right greater than left


 














Assessment & Plan





- Diagnosis


(1) Coagulase negative Staphylococcus bacteremia


Is this a current diagnosis for this admission?: Yes   


Plan: 


resolved.  ABX finished.  He is now neutropenic, so watch for any fever.  








(2) DVT of left axillary vein, acute


Is this a current diagnosis for this admission?: Yes   


Plan: 


PLT >50, so may be restarted.  








(3) Pancreatic cancer metastasized to liver


Is this a current diagnosis for this admission?: Yes   


Plan: 


neutropenia due to chemo.  Patient requests to continue aggressive treatment.  

He remains full code.  








(4) Malnutrition


Qualifiers: 


   Malnutrition type: protein-calorie malnutrition   Protein-calorie 

malnutrition severity: moderate   Qualified Code(s): E44.0 - Moderate protein-

calorie malnutrition   


Is this a current diagnosis for this admission?: Yes   


Plan: 


Patient may need PEG tube for further nutrition.  Patient considering.  








- Time


Time Spent with patient: 15-24 minutes
Subjective


Date:: 11/29/20


Subjective:: 





Appears chronically ill.


Patient was seen and examined today.  Started TPN.  Hemoglobin 7.6, platelet 

139, white count 1.0.  Tolerating some food better.  Mother is at bedside.


Reason For Visit: 


FLOFIRIFOX








Physical Exam


Vital Signs: 


                                        











Temp Pulse Resp BP Pulse Ox


 


 97.9 F   108 H  16   122/64   99 


 


 11/29/20 08:35  11/29/20 08:35  11/29/20 08:35  11/29/20 08:35  11/29/20 08:35








                                 Intake & Output











 11/28/20 11/29/20 11/30/20





 06:59 06:59 06:59


 


Intake Total 1071 1086 0


 


Output Total 600 1145 


 


Balance 471 -59 0


 


Weight 132 lb 11.492 oz 132 lb 11.492 oz 











Exam: 





General appearance: PRESENT: thin


Head exam: PRESENT: atraumatic


Eye exam: PRESENT: EOMI


Ear exam: ABSENT: bleeding


Respiratory exam: PRESENT: clear to auscultation destiny


Cardiovascular exam: PRESENT: RRR, rubs, +S2


GI/Abdominal exam: PRESENT: normal bowel sounds, soft


Neurological exam: PRESENT: alert, awake, oriented to person, oriented to place,

oriented to time, oriented to situation


Psychiatric exam: PRESENT: anxious





Results


Laboratory Results: 


                                        





                                 11/29/20 03:15 





                                 11/29/20 03:15 





                                        











  11/29/20 11/29/20 11/29/20





  03:15 03:15 03:15


 


WBC  1.0 L*  


 


RBC  2.62 L  


 


Hgb  7.6 L  


 


Hct  23.3 L  


 


MCV  89  


 


MCH  28.8  


 


MCHC  32.5  


 


RDW  17.7 H  


 


Plt Count  139 L  


 


Seg Neutrophils %  33.0 L  


 


Sodium   146.1 H  Cancelled


 


Potassium   4.2  Cancelled


 


Chloride   121 H  Cancelled


 


Carbon Dioxide   18 L  Cancelled


 


Anion Gap   7  Cancelled


 


BUN   43 H  Cancelled


 


Creatinine   1.03  Cancelled


 


Est GFR ( Amer)   > 60  Cancelled


 


Est GFR (Non-Af Amer)    Cancelled


 


Glucose   128 H  Cancelled


 


Calcium   7.6 L  Cancelled


 


Phosphorus    2.3 L


 


Total Bilirubin   1.3  Cancelled


 


AST   19  Cancelled


 


Alkaline Phosphatase   114  Cancelled


 


Total Protein   3.8 L  Cancelled


 


Albumin   1.6 L  Cancelled


 


Prealbumin    < 3.0 L








                                        











  11/15/20





  22:52


 


Troponin I  < 0.012











Impressions: 


                                        





Abdomen/Pelvis CT  11/15/20 22:35


IMPRESSION:


 


1.  Stable pancreatic head mass and multiple hepatic metastases. 


Biliary drain and pneumobilia are also stable from the prior CT.


 


2.  Mild free fluid within the abdomen and pelvis, increased from


the prior study.


 


3.  Tiny bilateral pleural effusions and mild basilar atelectasis


or infiltration.


 


 


 


TECHNICAL DOCUMENTATION:


 


Quality ID # 436: Final reports with documentation of one or more


dose reduction techniques (e.g., Automated exposure control,


adjustment of the mA and/or kV according to patient size, use of


iterative reconstruction technique)


 


copyright 2011 [employer]- All Rights Reserved


 








Venous Doppler Study  11/16/20 08:07


IMPRESSION:  1.  POSITIVE EXAM. EVIDENCE OF DVT AND SVT LEFT ARM AS ABOVE.


 








Chest X-Ray  11/26/20 12:13


IMPRESSION:  Small bilateral pleural effusions in the posterior costophrenic 

sulci


Bibasilar airspace disease atelectasis versus pneumonia right greater than left


 














Assessment and Plan





- Diagnosis


(1) GIUSEPPE (acute kidney injury)


Is this a current diagnosis for this admission?: Yes   





(2) Coagulase negative Staphylococcus bacteremia


Is this a current diagnosis for this admission?: Yes   





(3) DVT of left axillary vein, acute


Is this a current diagnosis for this admission?: Yes   





(4) Leukocytosis


Qualifiers: 


   Leukocytosis type: unspecified   Qualified Code(s): D72.829 - Elevated white 

blood cell count, unspecified   


Is this a current diagnosis for this admission?: Yes   





(5) Pain of left calf


Is this a current diagnosis for this admission?: Yes   





(6) Hypotension


Qualifiers: 


   Hypotension type: hypotension due to hypovolemia   Qualified Code(s): I95.89 

- Other hypotension; E86.1 - Hypovolemia   


Is this a current diagnosis for this admission?: Yes   





(7) Malnutrition


Qualifiers: 


   Malnutrition type: protein-calorie malnutrition   Protein-calorie 

malnutrition severity: moderate   Qualified Code(s): E44.0 - Moderate protein-

calorie malnutrition   


Is this a current diagnosis for this admission?: Yes   





(8) Nausea & vomiting


Qualifiers: 


   Vomiting type: unspecified   Vomiting Intractability: intractable   Qualified

Code(s): R11.2 - Nausea with vomiting, unspecified   


Is this a current diagnosis for this admission?: Yes   





(9) Pancreatic cancer metastasized to liver


Is this a current diagnosis for this admission?: Yes   





- Plan Summary


Summary: 


(1) GIUSEPPE (acute kidney injury)


Resolved.  Monitor renal function





(2) Coagulase negative Staphylococcus bacteremia


Patient has Staph epi in 1/2 bottles from a set and Staph Simulans in another 

bottle from another set 


While it is unclear whether this is a true bacteremia or contaminant but 

polymicrobial nature strongly suggests contaminant.


We stopped antibiotics 11/25/2020.  Continue to monitor off antibiotic.





(3) DVT of left axillary vein, acute


Patient is on therapeutic Lovenox.  Arm elevation.  


Hematology recommends holding Lovenox for platelet count less than 50





(4) Leukocytosis


Etiology is questionable at this time.  Monitor CBC.  


Dr. Quintero agrees it could be due to malignancy or DVT but should cover with 

antibiotics for a couple of days just in case there is an occult infection.  He 

is on vancomycin and ceftriaxone.  


Chest x-ray on my interpretation of the images does not seem to show any right 

basilar infiltrates but does show elevated right hemidiaphragm likely due to 

hepatomegaly and some fullness in his perihilar right middle lobe region.


Currently patient is neutropenic.  He is afebrile so far.  Reverse contact 

precautions.





5) Pain of left calf


Pain control as needed.  He possibly could have a DVT this well but he is 

already on therapeutic Lovenox for the DVT in his arm.  Also could be cramping 

from which is not unusual from his chemo regimen. He does have baclofen ordered 

also for hiccups.





(6) Hypotension


resolved





(7) Malnutrition


Secondary to malignancy, nausea/vomiting.


Prealbumin is quite low.  Was seen by the dietitian who recommended chopped diet

which he is on.  Ensure supplements.  


Calorie count.


Nausea and vomiting improved.  Tolerating TPN.





(8) Nausea & vomiting


Antiemetics as needed.  Continue IV fluids.  Monitor electrolytes.  





(9) Pancreatic cancer metastasized to liver


Oncology is following.  Patient has completed first cycle of FOLFIRINOX 11/8 

with 5FU infusion concluded 11/20. 


Continue oxycodone for patient's neoplasm related abdominal pain.  On Colace and

lubiprostone.  MiraLAX as needed.  


His physical status has declined. Patient continues to struggle with nutrition. 

He is also very fatigued and unable to do much with PT.











- Time


Anticipated Discharge Disposition: Home, Self Care


Anticipated Discharge Timeframe: within 72 hours
Subjective


Date:: 11/29/20


Subjective:: 





Patient states that nausea and difficulty swallowing continue.  He is not necess

arily nauseated when not trying to eat.  He is hungry sometimes.  He reports 

that he sat up in chair for a while earlier.  TPN has been started.  We again 

discussed PEG tube, but patient declines.  Blood counts remain low, but he is 

afebrile.  He does request chemo again next week, although I have explained that

I believe he should be eating and drinking much better before I would consider 

any further chemo.  


Reason For Visit: 


FLOFIRIFOX








Physical Exam


Vital Signs: 


                                        











Temp Pulse Resp BP Pulse Ox


 


 97.9 F   108 H  16   122/64   99 


 


 11/29/20 08:35  11/29/20 08:35  11/29/20 08:35  11/29/20 08:35  11/29/20 08:35








                                 Intake & Output











 11/28/20 11/29/20 11/30/20





 06:59 06:59 06:59


 


Intake Total 1071 1086 0


 


Output Total 600 1145 


 


Balance 471 -59 0


 


Weight 60.2 kg 60.2 kg 











General appearance: PRESENT: no acute distress


Head exam: PRESENT: normocephalic


Respiratory exam: PRESENT: clear to auscultation destiny, unlabored


Cardiovascular exam: PRESENT: RRR


GI/Abdominal exam: PRESENT: soft.  ABSENT: tenderness


Extremities exam: PRESENT: +2 edema - all 4 extremities.


Neurological exam: PRESENT: awake


Psychiatric exam: PRESENT: flat affect


Skin exam: PRESENT: normal color





Results


Laboratory Results: 


                                        





                                 11/29/20 03:15 





                                 11/29/20 03:15 





                                        











  11/29/20 11/29/20 11/29/20





  03:15 03:15 03:15


 


WBC  1.0 L*  


 


RBC  2.62 L  


 


Hgb  7.6 L  


 


Hct  23.3 L  


 


MCV  89  


 


MCH  28.8  


 


MCHC  32.5  


 


RDW  17.7 H  


 


Plt Count  139 L  


 


Seg Neutrophils %  33.0 L  


 


Sodium   146.1 H  Cancelled


 


Potassium   4.2  Cancelled


 


Chloride   121 H  Cancelled


 


Carbon Dioxide   18 L  Cancelled


 


Anion Gap   7  Cancelled


 


BUN   43 H  Cancelled


 


Creatinine   1.03  Cancelled


 


Est GFR ( Amer)   > 60  Cancelled


 


Est GFR (Non-Af Amer)    Cancelled


 


Glucose   128 H  Cancelled


 


Calcium   7.6 L  Cancelled


 


Phosphorus    2.3 L


 


Total Bilirubin   1.3  Cancelled


 


AST   19  Cancelled


 


Alkaline Phosphatase   114  Cancelled


 


Total Protein   3.8 L  Cancelled


 


Albumin   1.6 L  Cancelled


 


Prealbumin    < 3.0 L








                                        











  11/15/20





  22:52


 


Troponin I  < 0.012











Impressions: 


                                        





Abdomen/Pelvis CT  11/15/20 22:35


IMPRESSION:


 


1.  Stable pancreatic head mass and multiple hepatic metastases. 


Biliary drain and pneumobilia are also stable from the prior CT.


 


2.  Mild free fluid within the abdomen and pelvis, increased from


the prior study.


 


3.  Tiny bilateral pleural effusions and mild basilar atelectasis


or infiltration.


 


 


 


TECHNICAL DOCUMENTATION:


 


Quality ID # 436: Final reports with documentation of one or more


dose reduction techniques (e.g., Automated exposure control,


adjustment of the mA and/or kV according to patient size, use of


iterative reconstruction technique)


 


copyright 2011 [employer]- All Rights Reserved


 








Venous Doppler Study  11/16/20 08:07


IMPRESSION:  1.  POSITIVE EXAM. EVIDENCE OF DVT AND SVT LEFT ARM AS ABOVE.


 








Chest X-Ray  11/26/20 12:13


IMPRESSION:  Small bilateral pleural effusions in the posterior costophrenic 

sulci


Bibasilar airspace disease atelectasis versus pneumonia right greater than left


 














Assessment & Plan





- Diagnosis


(1) Coagulase negative Staphylococcus bacteremia


Is this a current diagnosis for this admission?: Yes   





(2) DVT of left axillary vein, acute


Is this a current diagnosis for this admission?: Yes   


Plan: 


Lovenox has been restarted now that PLT are better.  








(3) Pancreatic cancer metastasized to liver


Is this a current diagnosis for this admission?: Yes   


Plan: 


s/p chemo.  Counts have been at their chuy.  Await resolution of ANC.  Next 

cycle due next week.  However, all treatment is palliative.  Patient does not 

have good insight that treatment may not improve his overall situation.  








(4) Malnutrition


Qualifiers: 


   Malnutrition type: protein-calorie malnutrition   Protein-calorie 

malnutrition severity: moderate   Qualified Code(s): E44.0 - Moderate protein-

calorie malnutrition   


Is this a current diagnosis for this admission?: Yes   


Plan: 


TPN has been started.  I again asked patient about PEG tube.  He declines. 








- Time


Time Spent with patient: 15-24 minutes





- Plan Summary


Plan Summary: 





HGB low, but patient declines blood transfusion today.  Will monitor.
Subjective


Date:: 11/30/20


Subjective:: 





Patient sleeping.  Mother is at bedside.  She states that he was very restless l

ast night and received ativan and is now sleeping.  He is still having thick 

secretions and coughing and is not able to swallow anything without panic.  He 

has not been strong enough to get up to chair.   


Reason For Visit: 


FLOFIRIFOX








Physical Exam


Vital Signs: 


                                        











Temp Pulse Resp BP Pulse Ox


 


 97.8 F   118 H  20   113/56 L  95 


 


 11/30/20 08:40  11/30/20 08:40  11/30/20 08:40  11/30/20 08:40  11/30/20 08:40








                                 Intake & Output











 11/29/20 11/30/20 12/01/20





 06:59 06:59 06:59


 


Intake Total 1086 200 


 


Output Total 1145 1050 


 


Balance -59 -850 


 


Weight 60.2 kg 59.7 kg 











General appearance: PRESENT: no acute distress


Head exam: PRESENT: normocephalic


Respiratory exam: PRESENT: unlabored


Extremities exam: PRESENT: other - 3+ pitting edema in all 4 extremities.


Neurological exam: PRESENT: other - sleeping, but arouses to touch.


Skin exam: PRESENT: normal color





Results


Laboratory Results: 


                                        





                                 11/30/20 04:35 





                                 11/30/20 04:35 





                                        











  11/30/20 11/30/20 11/30/20





  04:34 04:34 04:35


 


WBC    6.0  D


 


RBC    2.64 L


 


Hgb    7.7 L


 


Hct    23.5 L


 


MCV    89


 


MCH    29.0


 


MCHC    32.6


 


RDW    18.4 H


 


Plt Count    132 L


 


Sodium  148.5 H  


 


Potassium  4.0  


 


Chloride  121 H  


 


Carbon Dioxide  20 L  


 


Anion Gap  8  


 


BUN  40 H  


 


Creatinine  1.02  


 


Est GFR ( Amer)  > 60  


 


Glucose  118 H  


 


Calcium  8.0 L  


 


Phosphorus   


 


Magnesium   2.3 


 


Total Bilirubin   


 


AST   


 


Alkaline Phosphatase   


 


Total Protein   


 


Albumin   


 


Prealbumin   














  11/30/20





  04:35


 


WBC 


 


RBC 


 


Hgb 


 


Hct 


 


MCV 


 


MCH 


 


MCHC 


 


RDW 


 


Plt Count 


 


Sodium  148.7 H


 


Potassium  4.0


 


Chloride  121 H


 


Carbon Dioxide  20 L


 


Anion Gap  8


 


BUN  40 H


 


Creatinine  1.05


 


Est GFR (African Amer)  > 60


 


Glucose  118 H


 


Calcium  8.1 L


 


Phosphorus  1.8 L


 


Magnesium  2.3


 


Total Bilirubin  1.0


 


AST  17


 


Alkaline Phosphatase  152 H


 


Total Protein  3.9 L


 


Albumin  1.6 L


 


Prealbumin  < 3.0 L








                                        











  11/15/20





  22:52


 


Troponin I  < 0.012











Impressions: 


                                        





Abdomen/Pelvis CT  11/15/20 22:35


IMPRESSION:


 


1.  Stable pancreatic head mass and multiple hepatic metastases. 


Biliary drain and pneumobilia are also stable from the prior CT.


 


2.  Mild free fluid within the abdomen and pelvis, increased from


the prior study.


 


3.  Tiny bilateral pleural effusions and mild basilar atelectasis


or infiltration.


 


 


 


TECHNICAL DOCUMENTATION:


 


Quality ID # 436: Final reports with documentation of one or more


dose reduction techniques (e.g., Automated exposure control,


adjustment of the mA and/or kV according to patient size, use of


iterative reconstruction technique)


 


copyright 2011 [employer]- All Rights Reserved


 








Venous Doppler Study  11/16/20 08:07


IMPRESSION:  1.  POSITIVE EXAM. EVIDENCE OF DVT AND SVT LEFT ARM AS ABOVE.


 








Chest X-Ray  11/26/20 12:13


IMPRESSION:  Small bilateral pleural effusions in the posterior costophrenic 

sulci


Bibasilar airspace disease atelectasis versus pneumonia right greater than left


 














Assessment & Plan





- Diagnosis


(1) Coagulase negative Staphylococcus bacteremia


Is this a current diagnosis for this admission?: Yes   


Plan: 


antibiotics completed.  Currently with neutropenia, but no fevers.  








(2) DVT of left axillary vein, acute


Is this a current diagnosis for this admission?: Yes   


Plan: 


PLT count improved.  Lovenox BID continues.  








(3) Pancreatic cancer metastasized to liver


Is this a current diagnosis for this admission?: Yes   


Plan: 


I again discussed goals of care with patient's mother.  She understands that 

without nutrition, he will not be able to receive further chemo.  I again 

discussed feeding tube with mother, but she would like to discuss with Dr. Quintero.  TPN Day 3 today. 








(4) Malnutrition


Qualifiers: 


   Malnutrition type: protein-calorie malnutrition   Protein-calorie 

malnutrition severity: moderate   Qualified Code(s): E44.0 - Moderate protein-

calorie malnutrition   


Is this a current diagnosis for this admission?: Yes   





- Time


Time Spent with patient: 15-24 minutes
Subjective


Date:: 12/01/20


Subjective:: 


Pt doing much worse over last 1 wk. D/w mother at bedside who agrees w/ comfort 

care measures to proceed. Placed order for d/c planning to begin to discuss home

hospice. Placed order to d/c TPN also.





Reason For Visit: 


FLOFIRIFOX








Physical Exam


Vital Signs: 


                                        











Temp Pulse Resp BP Pulse Ox


 


 97.9 F   107 H  24 H  101/57 L  98 


 


 12/01/20 03:45  12/01/20 03:45  12/01/20 03:45  12/01/20 03:45  12/01/20 03:45








                                 Intake & Output











 11/30/20 12/01/20 12/02/20





 06:59 06:59 06:59


 


Intake Total 200 350 


 


Output Total 1050  


 


Balance -850 350 


 


Weight 59.7 kg 63 kg 











General appearance: PRESENT: no acute distress, well-developed, well-nourished


Head exam: PRESENT: atraumatic, normocephalic


Eye exam: PRESENT: conjunctiva pink, EOMI, PERRLA.  ABSENT: scleral icterus


Ear exam: PRESENT: normal external ear exam


Mouth exam: PRESENT: moist, tongue midline


Neck exam: ABSENT: carotid bruit, JVD, lymphadenopathy, thyromegaly


Respiratory exam: PRESENT: clear to auscultation destiny.  ABSENT: rales, rhonchi, 

wheezes


Cardiovascular exam: PRESENT: RRR.  ABSENT: diastolic murmur, rubs, systolic 

murmur


Pulses: PRESENT: normal dorsalis pedis pul


Vascular exam: PRESENT: normal capillary refill


GI/Abdominal exam: PRESENT: normal bowel sounds, soft.  ABSENT: distended, 

guarding, mass, organolmegaly, rebound, tenderness


Rectal exam: PRESENT: deferred


Extremities exam: PRESENT: full ROM.  ABSENT: calf tenderness, clubbing, pedal 

edema


Neurological exam: PRESENT: alert, awake, oriented to person, oriented to place,

oriented to time, oriented to situation, CN II-XII grossly intact.  ABSENT: 

motor sensory deficit


Psychiatric exam: PRESENT: appropriate affect, normal mood.  ABSENT: homicidal 

ideation, suicidal ideation


Skin exam: PRESENT: dry, intact, warm.  ABSENT: cyanosis, rash





Results


Laboratory Results: 


                                        





                                 11/30/20 17:30 





                                 11/30/20 04:35 





                                        











  11/27/20 11/30/20 11/30/20





  08:05 17:30 17:30


 


WBC  1.6 L D   11.2 H


 


RBC    2.59 L


 


Hgb    7.3 L


 


Hct    22.8 L


 


MCV    88


 


MCH    28.1


 


MCHC    31.9 L


 


RDW    18.2 H


 


Plt Count    128 L


 


Triglycerides   225 H 








                                        











  11/15/20





  22:52


 


Troponin I  < 0.012











Impressions: 


                                        





Abdomen/Pelvis CT  11/15/20 22:35


IMPRESSION:


 


1.  Stable pancreatic head mass and multiple hepatic metastases. 


Biliary drain and pneumobilia are also stable from the prior CT.


 


2.  Mild free fluid within the abdomen and pelvis, increased from


the prior study.


 


3.  Tiny bilateral pleural effusions and mild basilar atelectasis


or infiltration.


 


 


 


TECHNICAL DOCUMENTATION:


 


Quality ID # 436: Final reports with documentation of one or more


dose reduction techniques (e.g., Automated exposure control,


adjustment of the mA and/or kV according to patient size, use of


iterative reconstruction technique)


 


copyright 2011 [employer]- All Rights Reserved


 








Venous Doppler Study  11/16/20 08:07


IMPRESSION:  1.  POSITIVE EXAM. EVIDENCE OF DVT AND SVT LEFT ARM AS ABOVE.


 








Chest X-Ray  11/26/20 12:13


IMPRESSION:  Small bilateral pleural effusions in the posterior costophrenic 

sulci


Bibasilar airspace disease atelectasis versus pneumonia right greater than left


 














Assessment & Plan





- Diagnosis


(1) Nausea & vomiting


Qualifiers: 


   Vomiting type: unspecified   Vomiting Intractability: intractable   Qualified

Code(s): R11.2 - Nausea with vomiting, unspecified   


Is this a current diagnosis for this admission?: Yes   


Plan: 


Not improving, 2nd to probable obstruction.








(2) Pancreatic cancer metastasized to liver


Is this a current diagnosis for this admission?: Yes   


Plan: 


Worsened, pt not candidate for more therapy, life expectancy <6m, hospice 

appropriate. Placed d/c planning order








- Time


Time Spent with patient: 15-24 minutes
Subjective


Date:: 12/02/20


Subjective:: 


Doing worse today, but seems comfortable, working on home hospice thru [facility]





Reason For Visit: 


FLOFIRIFOX








Physical Exam


Vital Signs: 


                                        











Temp Pulse Resp BP Pulse Ox


 


 97.9 F   110 H  22 H  124/80   99 


 


 12/02/20 07:41  12/02/20 07:00  12/01/20 20:08  12/01/20 20:08  12/01/20 09:11








                                 Intake & Output











 12/01/20 12/02/20 12/03/20





 06:59 06:59 06:59


 


Intake Total 350 20 


 


Balance 350 20 


 


Weight 63 kg 64 kg 











General appearance: PRESENT: no acute distress, well-developed, well-nourished


Head exam: PRESENT: atraumatic, normocephalic


Eye exam: PRESENT: conjunctiva pink, EOMI, PERRLA.  ABSENT: scleral icterus


Ear exam: PRESENT: normal external ear exam


Mouth exam: PRESENT: moist, tongue midline


Neck exam: ABSENT: carotid bruit, JVD, lymphadenopathy, thyromegaly


Respiratory exam: PRESENT: clear to auscultation destiny.  ABSENT: rales, rhonchi, 

wheezes


Cardiovascular exam: PRESENT: RRR.  ABSENT: diastolic murmur, rubs, systolic 

murmur


Pulses: PRESENT: normal dorsalis pedis pul


Vascular exam: PRESENT: normal capillary refill


GI/Abdominal exam: PRESENT: normal bowel sounds, soft.  ABSENT: distended, 

guarding, mass, organolmegaly, rebound, tenderness


Rectal exam: PRESENT: deferred


Extremities exam: PRESENT: full ROM.  ABSENT: calf tenderness, clubbing, pedal 

edema


Neurological exam: PRESENT: alert, awake, oriented to person, oriented to place,

oriented to time, oriented to situation, CN II-XII grossly intact.  ABSENT: 

motor sensory deficit


Psychiatric exam: PRESENT: appropriate affect, normal mood.  ABSENT: homicidal 

ideation, suicidal ideation


Skin exam: PRESENT: dry, intact, warm.  ABSENT: cyanosis, rash





Results


Laboratory Results: 


                                        





                                 11/30/20 17:30 





                                 11/30/20 04:35 





                                        











  11/15/20





  22:52


 


Troponin I  < 0.012











Impressions: 


                                        





Abdomen/Pelvis CT  11/15/20 22:35


IMPRESSION:


 


1.  Stable pancreatic head mass and multiple hepatic metastases. 


Biliary drain and pneumobilia are also stable from the prior CT.


 


2.  Mild free fluid within the abdomen and pelvis, increased from


the prior study.


 


3.  Tiny bilateral pleural effusions and mild basilar atelectasis


or infiltration.


 


 


 


TECHNICAL DOCUMENTATION:


 


Quality ID # 436: Final reports with documentation of one or more


dose reduction techniques (e.g., Automated exposure control,


adjustment of the mA and/or kV according to patient size, use of


iterative reconstruction technique)


 


copyright 2011 [employer]- All Rights Reserved


 








Venous Doppler Study  11/16/20 08:07


IMPRESSION:  1.  POSITIVE EXAM. EVIDENCE OF DVT AND SVT LEFT ARM AS ABOVE.


 








Chest X-Ray  11/26/20 12:13


IMPRESSION:  Small bilateral pleural effusions in the posterior costophrenic 

sulci


Bibasilar airspace disease atelectasis versus pneumonia right greater than left


 














Assessment & Plan





- Diagnosis


(1) Nausea & vomiting


Qualifiers: 


   Vomiting type: unspecified   Vomiting Intractability: intractable   Qualified

Code(s): R11.2 - Nausea with vomiting, unspecified   


Is this a current diagnosis for this admission?: Yes   


Plan: 


seems controlled, started scop patch








(2) Pancreatic cancer metastasized to liver


Is this a current diagnosis for this admission?: Yes   


Plan: 


no further rx, home hopsice being arranged.








- Time


Time Spent with patient: 15-24 minutes
Subjective


Date:: 12/03/20


Subjective:: 


Patient was given oral liquid morphine as well as solution of Ativan and this 

seemed to be well-tolerated.





Reason For Visit: 


FLOFIRIFOX








Physical Exam


Vital Signs: 


                                        











Temp Pulse Resp BP Pulse Ox


 


 98.3 F   120 H  18   121/51 L  98 


 


 12/02/20 22:00  12/02/20 19:44  12/02/20 19:44  12/02/20 19:44  12/02/20 19:44








                                 Intake & Output











 12/02/20 12/03/20 12/04/20





 06:59 06:59 06:59


 


Intake Total 20  


 


Balance 20  


 


Weight 64 kg 63.5 kg 











General appearance: PRESENT: no acute distress, well-developed, well-nourished


Head exam: PRESENT: atraumatic, normocephalic


Eye exam: PRESENT: conjunctiva pink, EOMI, PERRLA.  ABSENT: scleral icterus


Ear exam: PRESENT: normal external ear exam


Mouth exam: PRESENT: moist, tongue midline


Neck exam: ABSENT: carotid bruit, JVD, lymphadenopathy, thyromegaly


Respiratory exam: PRESENT: clear to auscultation destiny.  ABSENT: rales, rhonchi, 

wheezes


Cardiovascular exam: PRESENT: RRR.  ABSENT: diastolic murmur, rubs, systolic 

murmur


Pulses: PRESENT: normal dorsalis pedis pul


Vascular exam: PRESENT: normal capillary refill


GI/Abdominal exam: PRESENT: normal bowel sounds, soft.  ABSENT: distended, 

guarding, mass, organolmegaly, rebound, tenderness


Rectal exam: PRESENT: deferred


Extremities exam: PRESENT: full ROM.  ABSENT: calf tenderness, clubbing, pedal 

edema


Neurological exam: PRESENT: alert, awake, oriented to person, oriented to place,

oriented to time, oriented to situation, CN II-XII grossly intact.  ABSENT: 

motor sensory deficit


Psychiatric exam: PRESENT: appropriate affect, normal mood.  ABSENT: homicidal 

ideation, suicidal ideation


Skin exam: PRESENT: dry, intact, warm.  ABSENT: cyanosis, rash





Results


Laboratory Results: 


                                        





                                 11/30/20 17:30 





                                 11/30/20 04:35 





                                        











  11/15/20





  22:52


 


Troponin I  < 0.012











Impressions: 


                                        





Abdomen/Pelvis CT  11/15/20 22:35


IMPRESSION:


 


1.  Stable pancreatic head mass and multiple hepatic metastases. 


Biliary drain and pneumobilia are also stable from the prior CT.


 


2.  Mild free fluid within the abdomen and pelvis, increased from


the prior study.


 


3.  Tiny bilateral pleural effusions and mild basilar atelectasis


or infiltration.


 


 


 


TECHNICAL DOCUMENTATION:


 


Quality ID # 436: Final reports with documentation of one or more


dose reduction techniques (e.g., Automated exposure control,


adjustment of the mA and/or kV according to patient size, use of


iterative reconstruction technique)


 


copyright 2011 [employer]- All Rights Reserved


 








Venous Doppler Study  11/16/20 08:07


IMPRESSION:  1.  POSITIVE EXAM. EVIDENCE OF DVT AND SVT LEFT ARM AS ABOVE.


 








Chest X-Ray  11/26/20 12:13


IMPRESSION:  Small bilateral pleural effusions in the posterior costophrenic 

sulci


Bibasilar airspace disease atelectasis versus pneumonia right greater than left


 














Assessment & Plan





- Diagnosis


(1) Nausea & vomiting


Qualifiers: 


   Vomiting type: unspecified   Vomiting Intractability: intractable   Qualified

Code(s): R11.2 - Nausea with vomiting, unspecified   


Is this a current diagnosis for this admission?: Yes   


Plan: 


Improved








(2) Pancreatic cancer metastasized to liver


Is this a current diagnosis for this admission?: Yes   


Plan: 


Hopeful DC home today on home hospice as the oral medications seem to work.








- Time


Time Spent with patient: 15-24 minutes
Subjective


Subjective:: 





Per Previous Physician:


"LEON WARNER is a 50 year old male with history of pancreatic cancer with 

metastasis to the liver, who presents to the hospital for evaluation of nausea, 

vomiting and generalized body weakness.  This has been a recurrent issue for 

patient.  He presented with similar issues a few weeks back.  He states his 

nausea vomiting picked up again several days ago.  His wife who is at bedside 

acknowledged that he has not been eating or drinking much of anything.  He 

continued to feel significantly dehydrated and today he felt very weak and 

decided to come to the hospital.  Had episode of vomiting as well while in the 

ER.  He was noted to be hypotensive as well.  He also has significant 

leukocytosis of 30,000.  He denies any cough, diarrhea, skin wounds, fevers or 

chills.  He does endorse dysuria.  He states he had a Port-A-Cath placed about 1

week ago and was meant to start chemotherapy with Dr. Quintero today."





11/30/2020


Patient does not seem to be improving at all.  He is still unable to eat 

although I find it strange that he is being given meal trays and his mother was 

told he can have "comfort feeding" however he remains full code and is not on 

comfort measures or hospice at this time.  I explained to the patient and his 

mother why this was inappropriate and patient should not be eating if we are 

pursuing aggressive measures at this time.  He is extremely high risk to 

aspirate and I do not believe he would survive an aspiration event at this 

point.  I strongly recommend patient be evaluated by hospice but his mother is 

reluctant to make this decision.  Is unclear if the patient can make his own 

decisions at this time given he is extremely ill and has episodes of confusion. 

They would like to wait to speak with Dr. Scooby crespo tomorrow and get his opinion 

on treatment going forward and possible hospice.  Hemoglobin is the same for the

third time in a row.  Blood cultures remain negative.  White blood cells 

increased up to 6 from previous 1.





12/1/2020


Patient and his mother have spoken with Dr. Scooby crespo and all are in agreement 

that the patient will be changed to comfort measures and DNR/DNI.  Patient will 

go home with hospice once arrangements have been made for DME delivery and 

oxygen.  Patient is bit more comfortable since we have started giving him 

additional narcotics and Ativan.  I discussed with his mother and with nursing 

that we can increase these medications as needed in order to keep the patient as

comfortable as possible.  Possible discharge home with hospice tomorrow





12/2/2020


Patient continues on comfort measures and appears to be more comfortable today. 

I discussed the plan going forward with the patient's mother at bedside and she 

is working with case management to get the patient all needed DME delivered to 

their home.  The plan is hopefully for discharge home with hospice tomorrow.


Reason For Visit: 


FLOFIRIFOX








Physical Exam


Vital Signs: 


                                        











Temp Pulse Resp BP Pulse Ox


 


 97.7 F   109 H  20   93/52 L  99 


 


 12/02/20 08:52  12/02/20 08:52  12/02/20 08:52  12/02/20 08:52  12/02/20 08:52








                                 Intake & Output











 12/01/20 12/02/20 12/03/20





 06:59 06:59 06:59


 


Intake Total 350 20 


 


Balance 350 20 


 


Weight 63 kg 64 kg 











Exam: 





General appearance: PRESENT: no acute distress, frail acutely and chronically 

ill-appearing white male, appears comfortable


Head exam: PRESENT: atraumatic, normocephalic


Eye exam: PRESENT: conjunctiva pink.  ABSENT: scleral icterus


Mouth exam: PRESENT: moist


Respiratory exam: PRESENT: clear to auscultation destiny.  ABSENT: rales, rhonchi, 

wheezes


Cardiovascular exam: PRESENT: RRR.  ABSENT: diastolic murmur, rubs, systolic 

murmur


GI/Abdominal exam: PRESENT: normal bowel sounds, exquisitely tender abdomen 

diffusely ABSENT: distended, mass


Neurological exam: PRESENT: Not alert or awake


Psychiatric exam: PRESENT: appropriate affect, normal mood


Skin exam: PRESENT: dry, intact, warm





Results


Laboratory Results: 


                                        





                                 11/30/20 17:30 





                                 11/30/20 04:35 





                                        











  11/15/20





  22:52


 


Troponin I  < 0.012











Impressions: 


                                        





Abdomen/Pelvis CT  11/15/20 22:35


IMPRESSION:


 


1.  Stable pancreatic head mass and multiple hepatic metastases. 


Biliary drain and pneumobilia are also stable from the prior CT.


 


2.  Mild free fluid within the abdomen and pelvis, increased from


the prior study.


 


3.  Tiny bilateral pleural effusions and mild basilar atelectasis


or infiltration.


 


 


 


TECHNICAL DOCUMENTATION:


 


Quality ID # 436: Final reports with documentation of one or more


dose reduction techniques (e.g., Automated exposure control,


adjustment of the mA and/or kV according to patient size, use of


iterative reconstruction technique)


 


copyright 2011 [employer]- All Rights Reserved


 








Venous Doppler Study  11/16/20 08:07


IMPRESSION:  1.  POSITIVE EXAM. EVIDENCE OF DVT AND SVT LEFT ARM AS ABOVE.


 








Chest X-Ray  11/26/20 12:13


IMPRESSION:  Small bilateral pleural effusions in the posterior costophrenic 

sulci


Bibasilar airspace disease atelectasis versus pneumonia right greater than left


 














Assessment and Plan





- Diagnosis


(1) Dysphagia


Is this a current diagnosis for this admission?: Yes   





(2) Pancreatic cancer metastasized to liver


Is this a current diagnosis for this admission?: Yes   





(3) GIUSEPPE (acute kidney injury)


Is this a current diagnosis for this admission?: Yes   





(4) Acute thrombosis of left subclavian vein


Is this a current diagnosis for this admission?: Yes   





(5) DVT of left axillary vein, acute


Is this a current diagnosis for this admission?: Yes   





(6) Malnutrition


Qualifiers: 


   Malnutrition type: protein-calorie malnutrition   Protein-calorie malnutr

ition severity: moderate   Qualified Code(s): E44.0 - Moderate protein-calorie 

malnutrition   


Is this a current diagnosis for this admission?: Yes   





- Plan Summary


Summary: 




















Comfort Measures


-Patient/MPOA in agreement it is in the patient's best interests to change goals

of care to comfort


-DNR/DNI CODE STATUS


-Comfort medications ordered for pain/anxiety/nausea


-Disposition plan for hospice on 12/3 after DME is delivered to home





























(1) GIUSEPPE (acute kidney injury)


Resolved.  Monitor renal function





(2) ruled out coagulase negative Staphylococcus bacteremia, contaminated blood 

culture


Per Previous Physician:


"Patient has Staph epi in 1/2 bottles from a set and Staph Simulans in another 

bottle from another set 


While it is unclear whether this is a true bacteremia or contaminant but 

polymicrobial nature strongly suggests contaminant.


We stopped antibiotics 11/25/2020.  Continue to monitor off antibiotic."


Extremely unlikely patient actually has bacteremia given culture results with 

multiple different organisms in different bottles.





(3) DVT of left axillary vein, acute


Per Previous Physician:


"Patient is on therapeutic Lovenox.  Arm elevation.  


Hematology recommends holding Lovenox for platelet count less than 50"





(4) Leukocytosis


Per Previous Physician:


"Etiology is questionable at this time.  Monitor CBC.  


Dr. Quintero agrees it could be due to malignancy or DVT but should cover with 

antibiotics for a couple of days just in case there is an occult infection.  He 

is on vancomycin and ceftriaxone.  


Chest x-ray on my interpretation of the images does not seem to show any right 

basilar infiltrates but does show elevated right hemidiaphragm likely due to 

hepatomegaly and some fullness in his perihilar right middle lobe region.


Currently patient is neutropenic.  He is afebrile so far.  Reverse contact 

precautions."


Improved





5) Pain of left calf


Per Previous Physician:


"Pain control as needed.  He possibly could have a DVT this well but he is 

already on therapeutic Lovenox for the DVT in his arm.  Also could be cramping 

from which is not unusual from his chemo regimen. He does have baclofen ordered 

also for hiccups."





(6) Hypotension


resolved





(7) Malnutrition


Per Previous Physician:


"Secondary to malignancy, nausea/vomiting.


Prealbumin is quite low.  Was seen by the dietitian who recommended chopped diet

which he is on.  Ensure supplements.  


Calorie count.


Nausea and vomiting improved.  Tolerating TPN."


Patient and family do not want a feeding tube, unlikely he would survive the 

procedure at this point 





(8) dysphagia, nausea & vomiting


Per Previous Physician:


"Antiemetics as needed.  Continue IV fluids.  Monitor electrolytes. "


N.p.o.


Speech therapy following





(9) Pancreatic cancer metastasized to liver


Per Previous Physician:


"Oncology is following.  Patient has completed first cycle of FOLFIRINOX 11/8 

with 5FU infusion concluded 11/20. 


Continue oxycodone for patient's neoplasm related abdominal pain.  On Colace and

lubiprostone.  MiraLAX as needed.  


His physical status has declined. Patient continues to struggle with nutrition. 

He is also very fatigued and unable to do much with PT."


Has been taking palliative chemotherapy prior to admission











- Time


Time Spent with patient: 15-24 minutes


Medications reviewed and adjusted accordingly: Yes


Anticipated Discharge Disposition: Home with Hospice


Anticipated Discharge Timeframe: within 24 hours





- Inpatient Certification


Based on my medical assessment, after consideration of the patient's 

comorbidities, presenting symptoms, or acuity I expect that the services needed 

warrant INPATIENT care.: Yes


I certify that my determination is in accordance with my understanding of 

Medicare's requirements for reasonable and necessary INPATIENT services [42 CFR 

412.3e].: Yes


Medical Necessity: Need for Pain Control
Subjective


Subjective:: 





Per Previous Physician:


"LEON WARNER is a 50 year old male with history of pancreatic cancer with 

metastasis to the liver, who presents to the hospital for evaluation of nausea, 

vomiting and generalized body weakness.  This has been a recurrent issue for 

patient.  He presented with similar issues a few weeks back.  He states his 

nausea vomiting picked up again several days ago.  His wife who is at bedside 

acknowledged that he has not been eating or drinking much of anything.  He 

continued to feel significantly dehydrated and today he felt very weak and 

decided to come to the hospital.  Had episode of vomiting as well while in the 

ER.  He was noted to be hypotensive as well.  He also has significant 

leukocytosis of 30,000.  He denies any cough, diarrhea, skin wounds, fevers or 

chills.  He does endorse dysuria.  He states he had a Port-A-Cath placed about 1

week ago and was meant to start chemotherapy with Dr. Quintero today."





11/30/2020


Patient does not seem to be improving at all.  He is still unable to eat 

although I find it strange that he is being given meal trays and his mother was 

told he can have "comfort feeding" however he remains full code and is not on 

comfort measures or hospice at this time.  I explained to the patient and his 

mother why this was inappropriate and patient should not be eating if we are 

pursuing aggressive measures at this time.  He is extremely high risk to 

aspirate and I do not believe he would survive an aspiration event at this 

point.  I strongly recommend patient be evaluated by hospice but his mother is 

reluctant to make this decision.  Is unclear if the patient can make his own 

decisions at this time given he is extremely ill and has episodes of confusion. 

They would like to wait to speak with Dr. Scooby crespo tomorrow and get his opinion 

on treatment going forward and possible hospice.  Hemoglobin is the same for the

third time in a row.  Blood cultures remain negative.  White blood cells 

increased up to 6 from previous 1.





12/1/2020


Patient and his mother have spoken with Dr. Scooby crespo and all are in agreement 

that the patient will be changed to comfort measures and DNR/DNI.  Patient will 

go home with hospice once arrangements have been made for DME delivery and 

oxygen.  Patient is bit more comfortable since we have started giving him 

additional narcotics and Ativan.  I discussed with his mother and with nursing 

that we can increase these medications as needed in order to keep the patient as

comfortable as possible.  Possible discharge home with hospice tomorrow


Reason For Visit: 


FLOFIRIFOX








Physical Exam


Vital Signs: 


                                        











Temp Pulse Resp BP Pulse Ox


 


 98.0 F   128 H  24 H  106/57 L  99 


 


 12/01/20 10:00  12/01/20 14:00  12/01/20 09:11  12/01/20 09:11  12/01/20 09:11








                                 Intake & Output











 11/30/20 12/01/20 12/02/20





 06:59 06:59 06:59


 


Intake Total 200 350 20


 


Output Total 1050  


 


Balance -850 350 20


 


Weight 59.7 kg 63 kg 63 kg











Exam: 





General appearance: PRESENT: no acute distress, frail acutely and chronically 

ill-appearing white male, seems more calm and comfortable today


Head exam: PRESENT: atraumatic, normocephalic


Eye exam: PRESENT: conjunctiva pink.  ABSENT: scleral icterus


Mouth exam: PRESENT: moist


Respiratory exam: PRESENT: clear to auscultation destiny.  ABSENT: rales, rhonchi, 

wheezes


Cardiovascular exam: PRESENT: RRR.  ABSENT: diastolic murmur, rubs, systolic 

murmur


GI/Abdominal exam: PRESENT: normal bowel sounds, exquisitely tender abdomen 

diffusely ABSENT: distended, mass


Neurological exam: PRESENT: alert, awake, oriented to person, oriented to place


Psychiatric exam: PRESENT: appropriate affect, normal mood


Skin exam: PRESENT: dry, intact, warm





Results


Laboratory Results: 


                                        





                                 11/30/20 17:30 





                                 11/30/20 04:35 





                                        











  11/30/20 11/30/20





  17:30 17:30


 


WBC   11.2 H


 


RBC   2.59 L


 


Hgb   7.3 L


 


Hct   22.8 L


 


MCV   88


 


MCH   28.1


 


MCHC   31.9 L


 


RDW   18.2 H


 


Plt Count   128 L


 


Triglycerides  225 H 








                                        











  11/15/20





  22:52


 


Troponin I  < 0.012











Impressions: 


                                        





Abdomen/Pelvis CT  11/15/20 22:35


IMPRESSION:


 


1.  Stable pancreatic head mass and multiple hepatic metastases. 


Biliary drain and pneumobilia are also stable from the prior CT.


 


2.  Mild free fluid within the abdomen and pelvis, increased from


the prior study.


 


3.  Tiny bilateral pleural effusions and mild basilar atelectasis


or infiltration.


 


 


 


TECHNICAL DOCUMENTATION:


 


Quality ID # 436: Final reports with documentation of one or more


dose reduction techniques (e.g., Automated exposure control,


adjustment of the mA and/or kV according to patient size, use of


iterative reconstruction technique)


 


copyright 2011 [employer]- All Rights Reserved


 








Venous Doppler Study  11/16/20 08:07


IMPRESSION:  1.  POSITIVE EXAM. EVIDENCE OF DVT AND SVT LEFT ARM AS ABOVE.


 








Chest X-Ray  11/26/20 12:13


IMPRESSION:  Small bilateral pleural effusions in the posterior costophrenic 

sulci


Bibasilar airspace disease atelectasis versus pneumonia right greater than left


 














Assessment and Plan





- Diagnosis


(1) Dysphagia


Is this a current diagnosis for this admission?: Yes   





(2) Pancreatic cancer metastasized to liver


Is this a current diagnosis for this admission?: Yes   





(3) GIUSEPPE (acute kidney injury)


Is this a current diagnosis for this admission?: Yes   





(4) Acute thrombosis of left subclavian vein


Is this a current diagnosis for this admission?: Yes   





(5) DVT of left axillary vein, acute


Is this a current diagnosis for this admission?: Yes   





(6) Malnutrition


Qualifiers: 


   Malnutrition type: protein-calorie malnutrition   Protein-calorie 

malnutrition severity: moderate   Qualified Code(s): E44.0 - Moderate 

protein-calorie malnutrition   


Is this a current diagnosis for this admission?: Yes   





- Plan Summary


Summary: 




















Comfort Measures


-Patient/MPOA in agreement it is in the patient's best interests to change goals

of care to comfort


-DNR/DNI CODE STATUS


-Comfort medications ordered for pain/anxiety/nausea


-Disposition plan for hospice





























(1) GIUSEPPE (acute kidney injury)


Resolved.  Monitor renal function





(2) ruled out coagulase negative Staphylococcus bacteremia, contaminated blood 

culture


Per Previous Physician:


"Patient has Staph epi in 1/2 bottles from a set and Staph Simulans in another 

bottle from another set 


While it is unclear whether this is a true bacteremia or contaminant but 

polymicrobial nature strongly suggests contaminant.


We stopped antibiotics 11/25/2020.  Continue to monitor off antibiotic."


Extremely unlikely patient actually has bacteremia given culture results with 

multiple different organisms in different bottles.





(3) DVT of left axillary vein, acute


Per Previous Physician:


"Patient is on therapeutic Lovenox.  Arm elevation.  


Hematology recommends holding Lovenox for platelet count less than 50"





(4) Leukocytosis


Per Previous Physician:


"Etiology is questionable at this time.  Monitor CBC.  


Dr. Jayaram agrees it could be due to malignancy or DVT but should cover with 

antibiotics for a couple of days just in case there is an occult infection.  He 

is on vancomycin and ceftriaxone.  


Chest x-ray on my interpretation of the images does not seem to show any right 

basilar infiltrates but does show elevated right hemidiaphragm likely due to 

hepatomegaly and some fullness in his perihilar right middle lobe region.


Currently patient is neutropenic.  He is afebrile so far.  Reverse contact 

precautions."


Improved





5) Pain of left calf


Per Previous Physician:


"Pain control as needed.  He possibly could have a DVT this well but he is 

already on therapeutic Lovenox for the DVT in his arm.  Also could be cramping 

from which is not unusual from his chemo regimen. He does have baclofen ordered 

also for hiccups."





(6) Hypotension


resolved





(7) Malnutrition


Per Previous Physician:


"Secondary to malignancy, nausea/vomiting.


Prealbumin is quite low.  Was seen by the dietitian who recommended chopped diet

which he is on.  Ensure supplements.  


Calorie count.


Nausea and vomiting improved.  Tolerating TPN."


Patient and family do not want a feeding tube, unlikely he would survive the 

procedure at this point 





(8) dysphagia, nausea & vomiting


Per Previous Physician:


"Antiemetics as needed.  Continue IV fluids.  Monitor electrolytes. "


N.p.o.


Speech therapy following





(9) Pancreatic cancer metastasized to liver


Per Previous Physician:


"Oncology is following.  Patient has completed first cycle of FOLFIRINOX 11/8 

with 5FU infusion concluded 11/20. 


Continue oxycodone for patient's neoplasm related abdominal pain.  On Colace and

lubiprostone.  MiraLAX as needed.  


His physical status has declined. Patient continues to struggle with nutrition. 

He is also very fatigued and unable to do much with PT."


Has been taking palliative chemotherapy prior to admission











- Time


Time Spent with patient: 15-24 minutes


Medications reviewed and adjusted accordingly: Yes


Anticipated Discharge Disposition: Home with Hospice


Anticipated Discharge Timeframe: within 24 hours





- Inpatient Certification


Based on my medical assessment, after consideration of the patient's 

comorbidities, presenting symptoms, or acuity I expect that the services needed 

warrant INPATIENT care.: Yes


I certify that my determination is in accordance with my understanding of 

Medicare's requirements for reasonable and necessary INPATIENT services [42 CFR 

412.3e].: Yes


Medical Necessity: Significant Comorbidiites Make Outpatient Treatment Too 

Risky, Need Close Monitoring Due to Risk of Patient Decompensation, Need for 

Pain Control, Risk of Complication if Not Cared For in Hospital, Risk of 

Diagnosis Which Will Require Inpatient Eval/Care/Monitoring
Subjective


Subjective:: 





Per Previous Physician:


"LEON WARNER is a 50 year old male with history of pancreatic cancer with 

metastasis to the liver, who presents to the hospital for evaluation of nausea, 

vomiting and generalized body weakness.  This has been a recurrent issue for 

patient.  He presented with similar issues a few weeks back.  He states his 

nausea vomiting picked up again several days ago.  His wife who is at bedside 

acknowledged that he has not been eating or drinking much of anything.  He 

continued to feel significantly dehydrated and today he felt very weak and 

decided to come to the hospital.  Had episode of vomiting as well while in the 

ER.  He was noted to be hypotensive as well.  He also has significant 

leukocytosis of 30,000.  He denies any cough, diarrhea, skin wounds, fevers or 

chills.  He does endorse dysuria.  He states he had a Port-A-Cath placed about 1

week ago and was meant to start chemotherapy with Dr. Quintero today."





11/30/2020


Patient does not seem to be improving at all.  He is still unable to eat 

although I find it strange that he is being given meal trays and his mother was 

told he can have "comfort feeding" however he remains full code and is not on 

comfort measures or hospice at this time.  I explained to the patient and his 

mother why this was inappropriate and patient should not be eating if we are 

pursuing aggressive measures at this time.  He is extremely high risk to 

aspirate and I do not believe he would survive an aspiration event at this 

point.  I strongly recommend patient be evaluated by hospice but his mother is 

reluctant to make this decision.  Is unclear if the patient can make his own 

decisions at this time given he is extremely ill and has episodes of confusion. 

They would like to wait to speak with Dr. Scooby crespo tomorrow and get his opinion 

on treatment going forward and possible hospice.  Hemoglobin is the same for the

third time in a row.  Blood cultures remain negative.  White blood cells 

increased up to 6 from previous 1.


Reason For Visit: 


FLOFIRIFOX








Physical Exam


Vital Signs: 


                                        











Temp Pulse Resp BP Pulse Ox


 


 98.3 F   108 H  22 H  104/50 L  97 


 


 11/30/20 15:50  11/30/20 15:50  11/30/20 15:50  11/30/20 15:50  11/30/20 15:50








                                 Intake & Output











 11/29/20 11/30/20 12/01/20





 06:59 06:59 06:59


 


Intake Total 1086 200 350


 


Output Total 1145 1050 


 


Balance -59 -850 350


 


Weight 60.2 kg 59.7 kg 58.3 kg











Exam: 





General appearance: PRESENT: no acute distress, frail acutely and chronically 

ill-appearing white male


Head exam: PRESENT: atraumatic, normocephalic


Eye exam: PRESENT: conjunctiva pink.  ABSENT: scleral icterus


Mouth exam: PRESENT: moist


Respiratory exam: PRESENT: clear to auscultation destiny.  ABSENT: rales, rhonchi, 

wheezes


Cardiovascular exam: PRESENT: RRR.  ABSENT: diastolic murmur, rubs, systolic 

murmur


GI/Abdominal exam: PRESENT: normal bowel sounds, exquisitely tender abdomen 

diffusely ABSENT: distended, mass


Neurological exam: PRESENT: alert, awake, oriented to person, oriented to place


Psychiatric exam: PRESENT: appropriate affect, normal mood


Skin exam: PRESENT: dry, intact, warm





Results


Laboratory Results: 


                                        





                                 11/30/20 17:30 





                                 11/30/20 04:35 





                                        











  11/27/20 11/30/20 11/30/20





  08:05 04:34 04:34


 


WBC  1.6 L D  


 


RBC   


 


Hgb   


 


Hct   


 


MCV   


 


MCH   


 


MCHC   


 


RDW   


 


Plt Count   


 


Sodium   148.5 H 


 


Potassium   4.0 


 


Chloride   121 H 


 


Carbon Dioxide   20 L 


 


Anion Gap   8 


 


BUN   40 H 


 


Creatinine   1.02 


 


Est GFR ( Amer)   > 60 


 


Glucose   118 H 


 


Calcium   8.0 L 


 


Phosphorus   


 


Magnesium    2.3


 


Total Bilirubin   


 


AST   


 


Alkaline Phosphatase   


 


Total Protein   


 


Albumin   


 


Prealbumin   














  11/30/20 11/30/20 11/30/20





  04:35 04:35 17:30


 


WBC  6.0  D   11.2 H


 


RBC  2.64 L   2.59 L


 


Hgb  7.7 L   7.3 L


 


Hct  23.5 L   22.8 L


 


MCV  89   88


 


MCH  29.0   28.1


 


MCHC  32.6   31.9 L


 


RDW  18.4 H   18.2 H


 


Plt Count  132 L   128 L


 


Sodium   148.7 H 


 


Potassium   4.0 


 


Chloride   121 H 


 


Carbon Dioxide   20 L 


 


Anion Gap   8 


 


BUN   40 H 


 


Creatinine   1.05 


 


Est GFR ( Amer)   > 60 


 


Glucose   118 H 


 


Calcium   8.1 L 


 


Phosphorus   1.8 L 


 


Magnesium   2.3 


 


Total Bilirubin   1.0 


 


AST   17 


 


Alkaline Phosphatase   152 H 


 


Total Protein   3.9 L 


 


Albumin   1.6 L 


 


Prealbumin   < 3.0 L 








                                        











  11/15/20





  22:52


 


Troponin I  < 0.012











Impressions: 


                                        





Abdomen/Pelvis CT  11/15/20 22:35


IMPRESSION:


 


1.  Stable pancreatic head mass and multiple hepatic metastases. 


Biliary drain and pneumobilia are also stable from the prior CT.


 


2.  Mild free fluid within the abdomen and pelvis, increased from


the prior study.


 


3.  Tiny bilateral pleural effusions and mild basilar atelectasis


or infiltration.


 


 


 


TECHNICAL DOCUMENTATION:


 


Quality ID # 436: Final reports with documentation of one or more


dose reduction techniques (e.g., Automated exposure control,


adjustment of the mA and/or kV according to patient size, use of


iterative reconstruction technique)


 


copyright 2011 [employer]- All Rights Reserved


 








Venous Doppler Study  11/16/20 08:07


IMPRESSION:  1.  POSITIVE EXAM. EVIDENCE OF DVT AND SVT LEFT ARM AS ABOVE.


 








Chest X-Ray  11/26/20 12:13


IMPRESSION:  Small bilateral pleural effusions in the posterior costophrenic 

sulci


Bibasilar airspace disease atelectasis versus pneumonia right greater than left


 














Assessment and Plan





- Diagnosis


(1) Dysphagia


Is this a current diagnosis for this admission?: Yes   





(2) Pancreatic cancer metastasized to liver


Is this a current diagnosis for this admission?: Yes   





(3) GIUSEPPE (acute kidney injury)


Is this a current diagnosis for this admission?: Yes   





(4) Acute thrombosis of left subclavian vein


Is this a current diagnosis for this admission?: Yes   





(5) DVT of left axillary vein, acute


Is this a current diagnosis for this admission?: Yes   





(6) Malnutrition


Qualifiers: 


   Malnutrition type: protein-calorie malnutrition   Protein-calorie 

malnutrition severity: moderate   Qualified Code(s): E44.0 - Moderate protein-

calorie malnutrition   


Is this a current diagnosis for this admission?: Yes   





- Plan Summary


Summary: 




















(1) GIUSEPPE (acute kidney injury)


Resolved.  Monitor renal function





(2) ruled out coagulase negative Staphylococcus bacteremia, contaminated blood 

culture


Per Previous Physician:


"Patient has Staph epi in 1/2 bottles from a set and Staph Simulans in another 

bottle from another set 


While it is unclear whether this is a true bacteremia or contaminant but 

polymicrobial nature strongly suggests contaminant.


We stopped antibiotics 11/25/2020.  Continue to monitor off antibiotic."


Extremely unlikely patient actually has bacteremia given culture results with 

multiple different organisms in different bottles.





(3) DVT of left axillary vein, acute


Per Previous Physician:


"Patient is on therapeutic Lovenox.  Arm elevation.  


Hematology recommends holding Lovenox for platelet count less than 50"





(4) Leukocytosis


Per Previous Physician:


"Etiology is questionable at this time.  Monitor CBC.  


Dr. Quintero agrees it could be due to malignancy or DVT but should cover with 

antibiotics for a couple of days just in case there is an occult infection.  He 

is on vancomycin and ceftriaxone.  


Chest x-ray on my interpretation of the images does not seem to show any right 

basilar infiltrates but does show elevated right hemidiaphragm likely due to 

hepatomegaly and some fullness in his perihilar right middle lobe region.


Currently patient is neutropenic.  He is afebrile so far.  Reverse contact 

precautions."


Improved





5) Pain of left calf


Per Previous Physician:


"Pain control as needed.  He possibly could have a DVT this well but he is 

already on therapeutic Lovenox for the DVT in his arm.  Also could be cramping 

from which is not unusual from his chemo regimen. He does have baclofen ordered 

also for hiccups."





(6) Hypotension


resolved





(7) Malnutrition


Per Previous Physician:


"Secondary to malignancy, nausea/vomiting.


Prealbumin is quite low.  Was seen by the dietitian who recommended chopped diet

which he is on.  Ensure supplements.  


Calorie count.


Nausea and vomiting improved.  Tolerating TPN."


Patient and family do not want a feeding tube, unlikely he would survive the 

procedure at this point 





(8) dysphagia, nausea & vomiting


Per Previous Physician:


"Antiemetics as needed.  Continue IV fluids.  Monitor electrolytes. "


N.p.o.


Speech therapy following





(9) Pancreatic cancer metastasized to liver


Per Previous Physician:


"Oncology is following.  Patient has completed first cycle of FOLFIRINOX 11/8 

with 5FU infusion concluded 11/20. 


Continue oxycodone for patient's neoplasm related abdominal pain.  On Colace and

lubiprostone.  MiraLAX as needed.  


His physical status has declined. Patient continues to struggle with nutrition. 

He is also very fatigued and unable to do much with PT."


Has been taking palliative chemotherapy prior to admission











- Time


Time Spent with patient: 25-34 minutes


Medications reviewed and adjusted accordingly: Yes


Anticipated Discharge Disposition: Home with Hospice


Anticipated Discharge Timeframe: within 72 hours





- Inpatient Certification


Based on my medical assessment, after consideration of the patient's 

comorbidities, presenting symptoms, or acuity I expect that the services needed 

warrant INPATIENT care.: Yes


I certify that my determination is in accordance with my understanding of 

Medicare's requirements for reasonable and necessary INPATIENT services [42 CFR 

412.3e].: Yes


Medical Necessity: Significant Comorbidiites Make Outpatient Treatment Too 

Risky, Need Close Monitoring Due to Risk of Patient Decompensation, Risk of 

Complication if Not Cared For in Hospital, Risk of Diagnosis Which Will Require 

Inpatient Eval/Care/Monitoring
hard copy, drawn during this pregnancy